# Patient Record
Sex: MALE | Race: WHITE | NOT HISPANIC OR LATINO | Employment: OTHER | RURAL
[De-identification: names, ages, dates, MRNs, and addresses within clinical notes are randomized per-mention and may not be internally consistent; named-entity substitution may affect disease eponyms.]

---

## 2017-12-05 ENCOUNTER — HISTORICAL (OUTPATIENT)
Dept: ADMINISTRATIVE | Facility: HOSPITAL | Age: 72
End: 2017-12-05

## 2017-12-06 LAB
LAB AP CLINICAL INFORMATION: NORMAL
LAB AP DIAGNOSIS - HISTORICAL: NORMAL
LAB AP GROSS PATHOLOGY - HISTORICAL: NORMAL
LAB AP SPECIMEN SUBMITTED - HISTORICAL: NORMAL

## 2020-07-13 ENCOUNTER — HISTORICAL (OUTPATIENT)
Dept: ADMINISTRATIVE | Facility: HOSPITAL | Age: 75
End: 2020-07-13

## 2020-07-15 LAB
INSULIN SERPL-ACNC: NORMAL U[IU]/ML
LAB AP CLINICAL INFORMATION: NORMAL
LAB AP COMMENTS: NORMAL
LAB AP DIAGNOSIS - HISTORICAL: NORMAL
LAB AP GROSS PATHOLOGY - HISTORICAL: NORMAL
LAB AP SPECIMEN SUBMITTED - HISTORICAL: NORMAL

## 2020-11-18 ENCOUNTER — HISTORICAL (OUTPATIENT)
Dept: ADMINISTRATIVE | Facility: HOSPITAL | Age: 75
End: 2020-11-18

## 2020-11-20 ENCOUNTER — HISTORICAL (OUTPATIENT)
Dept: ADMINISTRATIVE | Facility: HOSPITAL | Age: 75
End: 2020-11-20

## 2020-11-20 LAB — SARS-COV+SARS-COV-2 AG RESP QL IA.RAPID: NEGATIVE

## 2021-03-29 RX ORDER — PRAVASTATIN SODIUM 80 MG/1
80 TABLET ORAL DAILY
Qty: 90 TABLET | Refills: 3 | Status: SHIPPED | OUTPATIENT
Start: 2021-03-29 | End: 2021-08-19 | Stop reason: SDUPTHER

## 2021-03-29 RX ORDER — PRAVASTATIN SODIUM 80 MG/1
80 TABLET ORAL DAILY
COMMUNITY
End: 2021-03-29 | Stop reason: SDUPTHER

## 2021-04-07 RX ORDER — LISINOPRIL 20 MG/1
20 TABLET ORAL DAILY
COMMUNITY
End: 2021-04-07 | Stop reason: SDUPTHER

## 2021-04-07 RX ORDER — LISINOPRIL 20 MG/1
20 TABLET ORAL DAILY
Qty: 90 TABLET | Refills: 3 | Status: SHIPPED | OUTPATIENT
Start: 2021-04-07 | End: 2021-08-19 | Stop reason: SDUPTHER

## 2021-07-12 ENCOUNTER — OFFICE VISIT (OUTPATIENT)
Dept: PRIMARY CARE CLINIC | Facility: CLINIC | Age: 76
End: 2021-07-12
Payer: MEDICARE

## 2021-07-12 VITALS
SYSTOLIC BLOOD PRESSURE: 118 MMHG | BODY MASS INDEX: 40.5 KG/M2 | DIASTOLIC BLOOD PRESSURE: 82 MMHG | HEART RATE: 74 BPM | OXYGEN SATURATION: 98 % | WEIGHT: 299 LBS | RESPIRATION RATE: 16 BRPM | HEIGHT: 72 IN

## 2021-07-12 DIAGNOSIS — E66.01 MORBID OBESITY DUE TO EXCESS CALORIES: ICD-10-CM

## 2021-07-12 DIAGNOSIS — E78.5 HYPERLIPIDEMIA, UNSPECIFIED HYPERLIPIDEMIA TYPE: ICD-10-CM

## 2021-07-12 DIAGNOSIS — Z12.11 SCREENING FOR MALIGNANT NEOPLASM OF COLON: ICD-10-CM

## 2021-07-12 DIAGNOSIS — I10 ESSENTIAL HYPERTENSION: Primary | ICD-10-CM

## 2021-07-12 PROCEDURE — 99173 VISUAL ACUITY SCREEN: CPT | Mod: ,,, | Performed by: NURSE PRACTITIONER

## 2021-07-12 PROCEDURE — G0438 PPPS, INITIAL VISIT: HCPCS | Mod: ,,, | Performed by: NURSE PRACTITIONER

## 2021-07-12 PROCEDURE — 99173 PR VISUAL SCREENING TEST, BILAT: ICD-10-PCS | Mod: ,,, | Performed by: NURSE PRACTITIONER

## 2021-07-12 PROCEDURE — G0438 PR WELCOME MEDICARE ANNUAL WELLNESS INITIAL VISIT: ICD-10-PCS | Mod: ,,, | Performed by: NURSE PRACTITIONER

## 2021-07-12 RX ORDER — GEMFIBROZIL 600 MG/1
600 TABLET, FILM COATED ORAL DAILY
COMMUNITY
Start: 2021-06-14 | End: 2021-08-19 | Stop reason: SDUPTHER

## 2021-07-12 RX ORDER — OMEPRAZOLE 20 MG/1
20 CAPSULE, DELAYED RELEASE ORAL DAILY
COMMUNITY
Start: 2021-06-14 | End: 2021-08-19 | Stop reason: SDUPTHER

## 2021-07-12 RX ORDER — CETIRIZINE HYDROCHLORIDE, PSEUDOEPHEDRINE HYDROCHLORIDE 5; 120 MG/1; MG/1
1 TABLET, FILM COATED, EXTENDED RELEASE ORAL 2 TIMES DAILY
COMMUNITY
End: 2021-12-02 | Stop reason: SDUPTHER

## 2021-07-12 RX ORDER — AMLODIPINE BESYLATE 5 MG/1
5 TABLET ORAL 2 TIMES DAILY
COMMUNITY
Start: 2021-06-14 | End: 2021-08-19 | Stop reason: SDUPTHER

## 2021-07-14 DIAGNOSIS — Z86.010 HX OF COLONIC POLYPS: Primary | ICD-10-CM

## 2021-07-20 ENCOUNTER — OFFICE VISIT (OUTPATIENT)
Dept: PRIMARY CARE CLINIC | Facility: CLINIC | Age: 76
End: 2021-07-20
Payer: MEDICARE

## 2021-07-20 VITALS
SYSTOLIC BLOOD PRESSURE: 120 MMHG | WEIGHT: 301 LBS | BODY MASS INDEX: 40.77 KG/M2 | OXYGEN SATURATION: 96 % | HEART RATE: 90 BPM | RESPIRATION RATE: 18 BRPM | DIASTOLIC BLOOD PRESSURE: 82 MMHG | HEIGHT: 72 IN

## 2021-07-20 DIAGNOSIS — M19.90 OSTEOARTHRITIS, UNSPECIFIED OSTEOARTHRITIS TYPE, UNSPECIFIED SITE: ICD-10-CM

## 2021-07-20 DIAGNOSIS — Z12.5 PROSTATE CANCER SCREENING: ICD-10-CM

## 2021-07-20 DIAGNOSIS — I10 HYPERTENSION, UNSPECIFIED TYPE: ICD-10-CM

## 2021-07-20 DIAGNOSIS — E11.9 TYPE 2 DIABETES MELLITUS WITHOUT COMPLICATION, WITHOUT LONG-TERM CURRENT USE OF INSULIN: Primary | ICD-10-CM

## 2021-07-20 PROCEDURE — 99214 OFFICE O/P EST MOD 30 MIN: CPT | Mod: ,,, | Performed by: FAMILY MEDICINE

## 2021-07-20 PROCEDURE — 99214 PR OFFICE/OUTPT VISIT, EST, LEVL IV, 30-39 MIN: ICD-10-PCS | Mod: ,,, | Performed by: FAMILY MEDICINE

## 2021-07-20 RX ORDER — HYDROCODONE BITARTRATE AND ACETAMINOPHEN 7.5; 325 MG/1; MG/1
1 TABLET ORAL EVERY 6 HOURS PRN
Qty: 60 TABLET | Refills: 0 | Status: SHIPPED | OUTPATIENT
Start: 2021-07-20 | End: 2021-12-02 | Stop reason: SDUPTHER

## 2021-07-21 ENCOUNTER — TELEPHONE (OUTPATIENT)
Dept: PRIMARY CARE CLINIC | Facility: CLINIC | Age: 76
End: 2021-07-21

## 2021-08-19 ENCOUNTER — ANESTHESIA EVENT (OUTPATIENT)
Dept: GASTROENTEROLOGY | Facility: HOSPITAL | Age: 76
End: 2021-08-19
Payer: MEDICARE

## 2021-08-19 ENCOUNTER — HOSPITAL ENCOUNTER (OUTPATIENT)
Dept: GASTROENTEROLOGY | Facility: HOSPITAL | Age: 76
Discharge: HOME | End: 2021-08-19
Attending: INTERNAL MEDICINE
Payer: MEDICARE

## 2021-08-19 ENCOUNTER — ANESTHESIA (OUTPATIENT)
Dept: GASTROENTEROLOGY | Facility: HOSPITAL | Age: 76
End: 2021-08-19
Payer: MEDICARE

## 2021-08-19 VITALS
BODY MASS INDEX: 41.58 KG/M2 | HEIGHT: 71 IN | SYSTOLIC BLOOD PRESSURE: 115 MMHG | TEMPERATURE: 98 F | OXYGEN SATURATION: 95 % | RESPIRATION RATE: 28 BRPM | DIASTOLIC BLOOD PRESSURE: 76 MMHG | HEART RATE: 74 BPM | WEIGHT: 297 LBS

## 2021-08-19 DIAGNOSIS — Z86.010 HISTORY OF COLON POLYPS: ICD-10-CM

## 2021-08-19 DIAGNOSIS — K57.30 DIVERTICULA, COLON: ICD-10-CM

## 2021-08-19 DIAGNOSIS — Z86.010 HX OF COLONIC POLYPS: ICD-10-CM

## 2021-08-19 DIAGNOSIS — K63.5 POLYP OF ASCENDING COLON, UNSPECIFIED TYPE: ICD-10-CM

## 2021-08-19 PROBLEM — Z12.11 SCREENING FOR COLON CANCER: Status: ACTIVE | Noted: 2021-07-20

## 2021-08-19 PROBLEM — Z86.0100 HISTORY OF COLON POLYPS: Status: ACTIVE | Noted: 2021-08-19

## 2021-08-19 PROCEDURE — D9220A PRA ANESTHESIA: ICD-10-PCS | Mod: PT,,, | Performed by: NURSE ANESTHETIST, CERTIFIED REGISTERED

## 2021-08-19 PROCEDURE — 88305 TISSUE EXAM BY PATHOLOGIST: CPT | Mod: SUR | Performed by: INTERNAL MEDICINE

## 2021-08-19 PROCEDURE — C1889 IMPLANT/INSERT DEVICE, NOC: HCPCS

## 2021-08-19 PROCEDURE — 88305 TISSUE EXAM BY PATHOLOGIST: CPT | Mod: 26,,, | Performed by: PATHOLOGY

## 2021-08-19 PROCEDURE — D9220A PRA ANESTHESIA: Mod: PT,,, | Performed by: NURSE ANESTHETIST, CERTIFIED REGISTERED

## 2021-08-19 PROCEDURE — 25000003 PHARM REV CODE 250: Performed by: NURSE ANESTHETIST, CERTIFIED REGISTERED

## 2021-08-19 PROCEDURE — 37000008 HC ANESTHESIA 1ST 15 MINUTES

## 2021-08-19 PROCEDURE — 27201423 OPTIME MED/SURG SUP & DEVICES STERILE SUPPLY

## 2021-08-19 PROCEDURE — 37000009 HC ANESTHESIA EA ADD 15 MINS

## 2021-08-19 PROCEDURE — 45380 COLONOSCOPY AND BIOPSY: CPT | Mod: PT

## 2021-08-19 PROCEDURE — 88305 SURGICAL PATHOLOGY: ICD-10-PCS | Mod: 26,,, | Performed by: PATHOLOGY

## 2021-08-19 PROCEDURE — 63600175 PHARM REV CODE 636 W HCPCS: Performed by: NURSE ANESTHETIST, CERTIFIED REGISTERED

## 2021-08-19 RX ORDER — PROPOFOL 10 MG/ML
INJECTION, EMULSION INTRAVENOUS
Status: DISCONTINUED | OUTPATIENT
Start: 2021-08-19 | End: 2021-08-19

## 2021-08-19 RX ORDER — LIDOCAINE HYDROCHLORIDE 20 MG/ML
INJECTION, SOLUTION EPIDURAL; INFILTRATION; INTRACAUDAL; PERINEURAL
Status: DISCONTINUED | OUTPATIENT
Start: 2021-08-19 | End: 2021-08-19

## 2021-08-19 RX ORDER — SODIUM CHLORIDE 0.9 % (FLUSH) 0.9 %
10 SYRINGE (ML) INJECTION
Status: DISCONTINUED | OUTPATIENT
Start: 2021-08-19 | End: 2021-08-20 | Stop reason: HOSPADM

## 2021-08-19 RX ADMIN — LIDOCAINE HYDROCHLORIDE 50 MG: 20 INJECTION, SOLUTION INTRAVENOUS at 10:08

## 2021-08-19 RX ADMIN — PROPOFOL 75 MG: 10 INJECTION, EMULSION INTRAVENOUS at 10:08

## 2021-08-19 RX ADMIN — PROPOFOL 50 MG: 10 INJECTION, EMULSION INTRAVENOUS at 10:08

## 2021-08-19 RX ADMIN — SODIUM CHLORIDE: 9 INJECTION, SOLUTION INTRAVENOUS at 10:08

## 2021-08-20 LAB
ESTROGEN SERPL-MCNC: NORMAL PG/ML
LAB AP GROSS DESCRIPTION: NORMAL
LAB AP LABORATORY NOTES: NORMAL
T3RU NFR SERPL: NORMAL %

## 2021-08-23 ENCOUNTER — TELEPHONE (OUTPATIENT)
Dept: GASTROENTEROLOGY | Facility: CLINIC | Age: 76
End: 2021-08-23

## 2021-08-23 RX ORDER — LISINOPRIL 20 MG/1
20 TABLET ORAL DAILY
Qty: 90 TABLET | Refills: 3 | Status: SHIPPED | OUTPATIENT
Start: 2021-08-23 | End: 2022-08-30 | Stop reason: SDUPTHER

## 2021-08-23 RX ORDER — PRAVASTATIN SODIUM 80 MG/1
80 TABLET ORAL DAILY
Qty: 90 TABLET | Refills: 3 | Status: SHIPPED | OUTPATIENT
Start: 2021-08-23 | End: 2022-08-30 | Stop reason: SDUPTHER

## 2021-08-23 RX ORDER — GEMFIBROZIL 600 MG/1
600 TABLET, FILM COATED ORAL DAILY
Qty: 90 TABLET | Refills: 3 | Status: SHIPPED | OUTPATIENT
Start: 2021-08-23 | End: 2022-08-30 | Stop reason: SDUPTHER

## 2021-08-23 RX ORDER — AMLODIPINE BESYLATE 5 MG/1
5 TABLET ORAL 2 TIMES DAILY
Qty: 180 TABLET | Refills: 3 | Status: SHIPPED | OUTPATIENT
Start: 2021-08-23 | End: 2022-08-30 | Stop reason: SDUPTHER

## 2021-08-23 RX ORDER — OMEPRAZOLE 20 MG/1
20 CAPSULE, DELAYED RELEASE ORAL DAILY
Qty: 90 CAPSULE | Refills: 3 | Status: SHIPPED | OUTPATIENT
Start: 2021-08-23 | End: 2022-08-30 | Stop reason: SDUPTHER

## 2021-12-02 RX ORDER — AZITHROMYCIN 500 MG/1
500 TABLET, FILM COATED ORAL DAILY
COMMUNITY
End: 2021-12-02 | Stop reason: SDUPTHER

## 2021-12-02 RX ORDER — CETIRIZINE HYDROCHLORIDE, PSEUDOEPHEDRINE HYDROCHLORIDE 5; 120 MG/1; MG/1
1 TABLET, FILM COATED, EXTENDED RELEASE ORAL 2 TIMES DAILY
Qty: 24 TABLET | Refills: 5 | Status: SHIPPED | OUTPATIENT
Start: 2021-12-02 | End: 2022-07-18

## 2021-12-02 RX ORDER — AZITHROMYCIN 500 MG/1
500 TABLET, FILM COATED ORAL DAILY
Qty: 5 TABLET | Refills: 1 | Status: SHIPPED | OUTPATIENT
Start: 2021-12-02 | End: 2022-03-01 | Stop reason: ALTCHOICE

## 2021-12-02 RX ORDER — HYDROCODONE BITARTRATE AND ACETAMINOPHEN 7.5; 325 MG/1; MG/1
1 TABLET ORAL EVERY 6 HOURS PRN
Qty: 60 TABLET | Refills: 0 | Status: SHIPPED | OUTPATIENT
Start: 2021-12-02 | End: 2022-03-01 | Stop reason: SDUPTHER

## 2022-02-25 DIAGNOSIS — I10 HTN (HYPERTENSION), BENIGN: ICD-10-CM

## 2022-02-25 DIAGNOSIS — E78.5 HYPERLIPIDEMIA, UNSPECIFIED HYPERLIPIDEMIA TYPE: ICD-10-CM

## 2022-02-25 DIAGNOSIS — E78.1 HYPERGLYCERIDEMIA: Primary | ICD-10-CM

## 2022-03-01 ENCOUNTER — OFFICE VISIT (OUTPATIENT)
Dept: PRIMARY CARE CLINIC | Facility: CLINIC | Age: 77
End: 2022-03-01
Payer: MEDICARE

## 2022-03-01 VITALS
HEIGHT: 71 IN | WEIGHT: 297 LBS | DIASTOLIC BLOOD PRESSURE: 72 MMHG | HEART RATE: 94 BPM | BODY MASS INDEX: 41.58 KG/M2 | OXYGEN SATURATION: 97 % | SYSTOLIC BLOOD PRESSURE: 130 MMHG | RESPIRATION RATE: 18 BRPM

## 2022-03-01 DIAGNOSIS — I10 ESSENTIAL HYPERTENSION: ICD-10-CM

## 2022-03-01 DIAGNOSIS — E11.9 TYPE 2 DIABETES MELLITUS WITHOUT COMPLICATION, WITHOUT LONG-TERM CURRENT USE OF INSULIN: ICD-10-CM

## 2022-03-01 DIAGNOSIS — E03.9 HYPOTHYROIDISM, UNSPECIFIED TYPE: ICD-10-CM

## 2022-03-01 DIAGNOSIS — M19.90 OSTEOARTHRITIS, UNSPECIFIED OSTEOARTHRITIS TYPE, UNSPECIFIED SITE: Primary | ICD-10-CM

## 2022-03-01 DIAGNOSIS — I10 HYPERTENSION, UNSPECIFIED TYPE: ICD-10-CM

## 2022-03-01 PROCEDURE — 20610 DRAIN/INJ JOINT/BURSA W/O US: CPT | Mod: RT,,, | Performed by: FAMILY MEDICINE

## 2022-03-01 PROCEDURE — 20610 PR DRAIN/INJECT LARGE JOINT/BURSA: ICD-10-PCS | Mod: RT,,, | Performed by: FAMILY MEDICINE

## 2022-03-01 PROCEDURE — 99214 OFFICE O/P EST MOD 30 MIN: CPT | Mod: ,,, | Performed by: FAMILY MEDICINE

## 2022-03-01 PROCEDURE — 99214 PR OFFICE/OUTPT VISIT, EST, LEVL IV, 30-39 MIN: ICD-10-PCS | Mod: ,,, | Performed by: FAMILY MEDICINE

## 2022-03-01 RX ORDER — METHYLPREDNISOLONE ACETATE 80 MG/ML
80 INJECTION, SUSPENSION INTRA-ARTICULAR; INTRALESIONAL; INTRAMUSCULAR; SOFT TISSUE
Status: COMPLETED | OUTPATIENT
Start: 2022-03-01 | End: 2022-03-01

## 2022-03-01 RX ORDER — HYDROCODONE BITARTRATE AND ACETAMINOPHEN 7.5; 325 MG/1; MG/1
1 TABLET ORAL EVERY 6 HOURS PRN
Qty: 60 TABLET | Refills: 0 | Status: SHIPPED | OUTPATIENT
Start: 2022-03-01 | End: 2022-07-18

## 2022-03-01 RX ORDER — DEXAMETHASONE SODIUM PHOSPHATE 4 MG/ML
4 INJECTION, SOLUTION INTRA-ARTICULAR; INTRALESIONAL; INTRAMUSCULAR; INTRAVENOUS; SOFT TISSUE
Status: COMPLETED | OUTPATIENT
Start: 2022-03-01 | End: 2022-03-01

## 2022-03-01 RX ADMIN — METHYLPREDNISOLONE ACETATE 80 MG: 80 INJECTION, SUSPENSION INTRA-ARTICULAR; INTRALESIONAL; INTRAMUSCULAR; SOFT TISSUE at 10:03

## 2022-03-01 RX ADMIN — DEXAMETHASONE SODIUM PHOSPHATE 4 MG: 4 INJECTION, SOLUTION INTRA-ARTICULAR; INTRALESIONAL; INTRAMUSCULAR; INTRAVENOUS; SOFT TISSUE at 10:03

## 2022-03-01 NOTE — PROGRESS NOTES
Subjective:      Patient ID: Diogenes Aguilar is a 77 y.o. male.    Chief Complaint: Follow-up (6mon. Ck-up), Hypertension, Diabetes, and (R) knee pain    Diogenes Aguilar a 77 y.o. male presents for follow up on all regular problems which are reviewed and discussed.   Needs right knee injected  Fu bp  Lab due next visit  Problem List Items Addressed This Visit        Cardiac/Vascular    Hypertension    Essential hypertension       Endocrine    Type 2 diabetes mellitus without complication, without long-term current use of insulin    Hypothyroidism       Orthopedic    Osteoarthritis - Primary          Past Medical History:  Past Medical History:   Diagnosis Date    Carcinoma of prostate     Diverticula, colon 8/19/2021    Essential hypertension     GERD (gastroesophageal reflux disease)     History of colon polyps 8/19/2021    Hyperlipidemia     Hypothyroidism     Obesity     Polyp of ascending colon 8/19/2021     Past Surgical History:   Procedure Laterality Date    COLONOSCOPY  04/08/2016    repeat 5 years    PROSTATECTOMY      UMBILICAL HERNIA REPAIR      x 3      Review of patient's allergies indicates:   Allergen Reactions    Opioids - morphine analogues      Current Outpatient Medications on File Prior to Visit   Medication Sig Dispense Refill    amLODIPine (NORVASC) 5 MG tablet Take 1 tablet (5 mg total) by mouth 2 (two) times daily. 180 tablet 3    cetirizine-pseudoephedrine 5-120 mg Tb12 Take 1 tablet by mouth 2 (two) times a day. 24 tablet 5    gemfibroziL (LOPID) 600 MG tablet Take 1 tablet (600 mg total) by mouth once daily. With food. 90 tablet 3    lisinopriL (PRINIVIL,ZESTRIL) 20 MG tablet Take 1 tablet (20 mg total) by mouth once daily. 90 tablet 3    omeprazole (PRILOSEC) 20 MG capsule Take 1 capsule (20 mg total) by mouth once daily. 90 capsule 3    pravastatin (PRAVACHOL) 80 MG tablet Take 1 tablet (80 mg total) by mouth once daily. 90 tablet 3    [DISCONTINUED] HYDROcodone-acetaminophen  "(NORCO) 7.5-325 mg per tablet Take 1 tablet by mouth every 6 (six) hours as needed for Pain. 60 tablet 0    [DISCONTINUED] azithromycin (ZITHROMAX) 500 MG tablet Take 1 tablet (500 mg total) by mouth once daily. 5 tablet 1     No current facility-administered medications on file prior to visit.     Social History     Socioeconomic History    Marital status:    Tobacco Use    Smoking status: Never Smoker    Smokeless tobacco: Never Used   Substance and Sexual Activity    Alcohol use: Yes     Comment: on occasion    Drug use: Never     Family History   Problem Relation Age of Onset    Breast cancer Mother          at age 49 from breast CA with mets       Review of Systems   Constitutional: Negative.    HENT: Negative for congestion, ear pain, nosebleeds and trouble swallowing.    Eyes: Negative for pain and itching.   Respiratory: Negative for chest tightness.    Cardiovascular: Negative for chest pain.   Gastrointestinal: Negative for abdominal distention.   Endocrine: Negative for cold intolerance and heat intolerance.   Genitourinary: Negative for difficulty urinating.   Musculoskeletal: Positive for arthralgias.   Neurological: Negative for dizziness.       Objective:     /72 (BP Location: Left arm, Patient Position: Sitting, BP Method: Large (Manual))   Pulse 94   Resp 18   Ht 5' 11" (1.803 m)   Wt 134.7 kg (297 lb)   SpO2 97%   BMI 41.42 kg/m²     Physical Exam  Constitutional:       Appearance: Normal appearance. He is obese.   HENT:      Head: Normocephalic and atraumatic.      Right Ear: External ear normal.      Left Ear: External ear normal.      Nose: Nose normal.      Mouth/Throat:      Mouth: Mucous membranes are moist.      Pharynx: Oropharynx is clear.   Eyes:      Pupils: Pupils are equal, round, and reactive to light.   Cardiovascular:      Rate and Rhythm: Normal rate and regular rhythm.      Heart sounds: Normal heart sounds.   Pulmonary:      Effort: Pulmonary effort " is normal.      Breath sounds: Normal breath sounds.   Abdominal:      Palpations: Abdomen is soft.   Musculoskeletal:         General: Swelling, tenderness and deformity present. Normal range of motion.      Cervical back: Normal range of motion and neck supple.   Skin:     General: Skin is warm and dry.   Neurological:      General: No focal deficit present.      Mental Status: He is alert.   Psychiatric:         Mood and Affect: Mood normal.         Behavior: Behavior normal.         Thought Content: Thought content normal.         Judgment: Judgment normal.       Assessment:     1. Osteoarthritis, unspecified osteoarthritis type, unspecified site    2. Type 2 diabetes mellitus without complication, without long-term current use of insulin    3. Hypothyroidism, unspecified type    4. Hypertension, unspecified type    5. Essential hypertension        Plan:     Problem List Items Addressed This Visit        Cardiac/Vascular    Hypertension    Essential hypertension       Endocrine    Type 2 diabetes mellitus without complication, without long-term current use of insulin    Hypothyroidism       Orthopedic    Osteoarthritis - Primary        No follow-ups on file.  Fu 6m + lab  After consent, r knee injected with 4mg decadron and 80mg depomedrol tolerated well.    I have discontinued DIOGENES Aguilar's azithromycin. I am also having him maintain his omeprazole, gemfibroziL, amLODIPine, lisinopriL, pravastatin, cetirizine-pseudoephedrine, and HYDROcodone-acetaminophen. We will continue to administer dexamethasone and methylPREDNISolone acetate.    Diogenes was seen today for follow-up, hypertension, diabetes and (r) knee pain.    Diagnoses and all orders for this visit:    Osteoarthritis, unspecified osteoarthritis type, unspecified site    Type 2 diabetes mellitus without complication, without long-term current use of insulin    Hypothyroidism, unspecified type    Hypertension, unspecified type    Essential  hypertension    Other orders  -     HYDROcodone-acetaminophen (NORCO) 7.5-325 mg per tablet; Take 1 tablet by mouth every 6 (six) hours as needed for Pain.  -     dexamethasone injection 4 mg  -     methylPREDNISolone acetate injection 80 mg      Medications Ordered This Encounter   Medications    dexamethasone injection 4 mg    HYDROcodone-acetaminophen (NORCO) 7.5-325 mg per tablet     Sig: Take 1 tablet by mouth every 6 (six) hours as needed for Pain.     Dispense:  60 tablet     Refill:  0     n/a      Order Specific Question:   I have reviewed the Prescription Drug Monitoring Program (PDMP) database for this patient prior to prescribing the above opioid medication     Answer:   Yes    methylPREDNISolone acetate injection 80 mg     [unfilled]  No orders of the defined types were placed in this encounter.

## 2022-03-11 DIAGNOSIS — Z71.89 COMPLEX CARE COORDINATION: ICD-10-CM

## 2022-04-30 ENCOUNTER — EXTERNAL CHRONIC CARE MANAGEMENT (OUTPATIENT)
Dept: PRIMARY CARE CLINIC | Facility: CLINIC | Age: 77
End: 2022-04-30
Payer: MEDICARE

## 2022-04-30 PROCEDURE — G0511 PR CHRONIC CARE MGMT, RHC OR FQHC ONLY, 20 MINS OR MORE: ICD-10-PCS | Mod: ,,, | Performed by: FAMILY MEDICINE

## 2022-04-30 PROCEDURE — G0511 CCM/BHI BY RHC/FQHC 20MIN MO: HCPCS | Mod: ,,, | Performed by: FAMILY MEDICINE

## 2022-05-31 ENCOUNTER — EXTERNAL CHRONIC CARE MANAGEMENT (OUTPATIENT)
Dept: PRIMARY CARE CLINIC | Facility: CLINIC | Age: 77
End: 2022-05-31
Payer: MEDICARE

## 2022-05-31 PROCEDURE — G0511 PR CHRONIC CARE MGMT, RHC OR FQHC ONLY, 20 MINS OR MORE: ICD-10-PCS | Mod: ,,, | Performed by: FAMILY MEDICINE

## 2022-05-31 PROCEDURE — G0511 CCM/BHI BY RHC/FQHC 20MIN MO: HCPCS | Mod: ,,, | Performed by: FAMILY MEDICINE

## 2022-06-30 ENCOUNTER — EXTERNAL CHRONIC CARE MANAGEMENT (OUTPATIENT)
Dept: PRIMARY CARE CLINIC | Facility: CLINIC | Age: 77
End: 2022-06-30
Payer: MEDICARE

## 2022-06-30 PROCEDURE — G0511 PR CHRONIC CARE MGMT, RHC OR FQHC ONLY, 20 MINS OR MORE: ICD-10-PCS | Mod: ,,, | Performed by: FAMILY MEDICINE

## 2022-06-30 PROCEDURE — G0511 CCM/BHI BY RHC/FQHC 20MIN MO: HCPCS | Mod: ,,, | Performed by: FAMILY MEDICINE

## 2022-07-11 ENCOUNTER — TELEPHONE (OUTPATIENT)
Dept: PRIMARY CARE CLINIC | Facility: CLINIC | Age: 77
End: 2022-07-11
Payer: MEDICARE

## 2022-07-13 DIAGNOSIS — Z11.59 NEED FOR HEPATITIS C SCREENING TEST: Primary | ICD-10-CM

## 2022-07-13 DIAGNOSIS — E11.69 TYPE 2 DIABETES MELLITUS WITH OTHER SPECIFIED COMPLICATION, WITHOUT LONG-TERM CURRENT USE OF INSULIN: ICD-10-CM

## 2022-07-15 NOTE — PROGRESS NOTES
[facility]     PATIENT NAME: Diogenes Aguilar   : 1945    AGE: 77 y.o. DATE: 2022   MRN: [id number]        Reason for Visit / Chief Complaint: Medicare AWV (Subsequent Medicare AWV )        Diogenes Aguilar presents for a Subsequent Medicare AWV today.     The following components were reviewed and updated:    Medical/Social/Family History:  Past Medical History:   Diagnosis Date    Carcinoma of prostate     Diverticula, colon 2021    Essential hypertension     GERD (gastroesophageal reflux disease)     History of colon polyps 2021    Hyperlipidemia     Hypothyroidism     Obesity     Polyp of ascending colon 2021        Family History   Problem Relation Age of Onset    Breast cancer Mother          at age 49 from breast CA with mets        Past Surgical History:   Procedure Laterality Date    COLONOSCOPY  2021    repeat 1 year    PROSTATECTOMY      UMBILICAL HERNIA REPAIR      x 3        Social History     Tobacco Use   Smoking Status Never Smoker   Smokeless Tobacco Never Used       Social History     Substance and Sexual Activity   Alcohol Use Yes    Comment: on occasion         · Allergies and Current Medications     Review of patient's allergies indicates:   Allergen Reactions    Opioids - morphine analogues        Current Outpatient Medications:     amLODIPine (NORVASC) 5 MG tablet, Take 1 tablet (5 mg total) by mouth 2 (two) times daily., Disp: 180 tablet, Rfl: 3    gemfibroziL (LOPID) 600 MG tablet, Take 1 tablet (600 mg total) by mouth once daily. With food., Disp: 90 tablet, Rfl: 3    lisinopriL (PRINIVIL,ZESTRIL) 20 MG tablet, Take 1 tablet (20 mg total) by mouth once daily., Disp: 90 tablet, Rfl: 3    omeprazole (PRILOSEC) 20 MG capsule, Take 1 capsule (20 mg total) by mouth once daily., Disp: 90 capsule, Rfl: 3    pravastatin (PRAVACHOL) 80 MG tablet, Take 1 tablet (80 mg total) by mouth once daily., Disp: 90 tablet, Rfl: 3     cetirizine-pseudoephedrine 5-120 mg Tb12, Take 1 tablet by mouth 2 (two) times a day. (Patient not taking: Reported on 7/18/2022), Disp: 24 tablet, Rfl: 5    HYDROcodone-acetaminophen (NORCO) 7.5-325 mg per tablet, Take 1 tablet by mouth every 6 (six) hours as needed for Pain. (Patient not taking: Reported on 7/18/2022), Disp: 60 tablet, Rfl: 0      · Health Risk Assessment   Fall Risk:  no   Obesity: BMI Body mass index is 40.17 kg/m².   Advance Directive:  Does not have an advanced directive.  Verbal information given and written information provided on today's AVS.   Depression: PHQ9- 0   HTN: DASH diet, exercise, weight management, med compliance, home BP monitoring, and follow-up discussed.   T2DM:  diabetic diet, glucose monitoring, activity level, weight management, med compliance, and follow-up discussed.  STI: not at risk   Statin Use: yes      · Health Maintenance   Last eye exam: 6 months ago with Dr. Watts   Last CV screen with lipids: 07/11/2022   Diabetes screening with fasting glucose or A1c: 07/11/2022   DEXA: NA              Last PSA screen: 07/20/2021   Colonoscopy: 08/23/2021  Repeat in 1 year   Flu Vaccine: out of season   Pneumonia vaccines: 13- 08/03/2017   COVID vaccine: 01/15/2021  02/12/2021   Hep B vaccine: NA  AAA screening: NA   HIV Screening: not at risk  Hepatitis C Screen: available  Low Dose CT Scan: NA    Health Maintenance Topics with due status: Not Due       Topic Last Completion Date    Lipid Panel 07/11/2022    Low Dose Statin 07/18/2022    Influenza Vaccine Not Due     Health Maintenance Due   Topic Date Due    Hepatitis C Screening  Never done    Diabetes Urine Screening  Never done    Foot Exam  Never done    Eye Exam  Never done    TETANUS VACCINE  Never done    Shingles Vaccine (1 of 2) Never done    Pneumococcal Vaccines (Age 65+) (2 - PPSV23 or PCV20) 08/03/2018    COVID-19 Vaccine (3 - Booster for Moderna series) 07/12/2021    Hemoglobin A1c  01/20/2022     Colonoscopy  08/19/2022         Lab results available in Epic or see dates from [facility] above:   Lab Results   Component Value Date    CHOL 149 07/11/2022    CHOL 176 07/20/2021     Lab Results   Component Value Date    HDL 44 07/11/2022    HDL 42 07/20/2021     Lab Results   Component Value Date    LDLCALC 84 07/11/2022    LDLCALC 109 07/20/2021     Lab Results   Component Value Date    TRIG 105 07/11/2022    TRIG 127 07/20/2021     Lab Results   Component Value Date    CHOLHDL 3.4 07/11/2022    CHOLHDL 4.2 07/20/2021       Lab Results   Component Value Date    HGBA1C 5.5 07/20/2021       Sodium   Date Value Ref Range Status   07/11/2022 138 136 - 145 mmol/L Final     Potassium   Date Value Ref Range Status   07/11/2022 4.9 3.5 - 5.1 mmol/L Final     Chloride   Date Value Ref Range Status   07/11/2022 102 98 - 107 mmol/L Final     CO2   Date Value Ref Range Status   07/11/2022 29 21 - 32 mmol/L Final     Glucose   Date Value Ref Range Status   07/11/2022 91 74 - 106 mg/dL Final     BUN   Date Value Ref Range Status   07/11/2022 25 (H) 7 - 18 mg/dL Final     Creatinine   Date Value Ref Range Status   07/11/2022 0.93 0.70 - 1.30 mg/dL Final     Calcium   Date Value Ref Range Status   07/11/2022 9.2 8.5 - 10.1 mg/dL Final     Total Protein   Date Value Ref Range Status   07/11/2022 7.5 6.4 - 8.2 g/dL Final     Albumin   Date Value Ref Range Status   07/11/2022 3.9 3.5 - 5.0 g/dL Final     Bilirubin, Total   Date Value Ref Range Status   07/11/2022 0.4 0.0 - 1.2 mg/dL Final     Alk Phos   Date Value Ref Range Status   07/11/2022 87 45 - 115 U/L Final     AST   Date Value Ref Range Status   07/11/2022 16 15 - 37 U/L Final     ALT   Date Value Ref Range Status   07/11/2022 10 (L) 16 - 61 U/L Final     Anion Gap   Date Value Ref Range Status   07/11/2022 12 7 - 16 mmol/L Final     eGFR   Date Value Ref Range Status   07/11/2022 84 >=60 mL/min/1.73m² Final         Lab Results   Component Value Date    PSA <0.010 07/20/2021  "         Incontinence  Bowel: no  Bladder: no      · Care Team:  Dr. Juanito CISSE  PCP        Dr. Watts  Ophthalmology        Dr. Raman  GI          **See Completed Assessments for Annual Wellness visit within the encounter summary    The following assessments were completed & reviewed:  · Depression Screening  · Cognitive function Screening  · Timed Get Up Test  · Whisper Test  · Vision Screen  · Health Risk Assessment  · Checklist of ADLs and IADLs        Objective  Vitals:    07/18/22 0819   BP: (!) 116/58   Pulse: 83   Resp: 16   Temp: 98.4 °F (36.9 °C)   SpO2: 96%   Weight: 130.6 kg (288 lb)   Height: 5' 11" (1.803 m)   PainSc: 0-No pain      Body mass index is 40.17 kg/m².  Ideal body weight: 75.3 kg (166 lb 0.1 oz)       Physical Exam      Assessment:     1. Encounter for subsequent annual wellness visit (AWV) in Medicare patient    2. Essential hypertension    3. Hypothyroidism, unspecified type    4. Hyperlipidemia, unspecified hyperlipidemia type    5. BMI 40.0-44.9, adult    6. Morbid obesity due to excess calories       Problem List Items Addressed This Visit        Cardiac/Vascular    Essential hypertension       Endocrine    Hypothyroidism      Other Visit Diagnoses     Encounter for subsequent annual wellness visit (AWV) in Medicare patient    -  Primary    Hyperlipidemia, unspecified hyperlipidemia type        BMI 40.0-44.9, adult        Morbid obesity due to excess calories                Plan:    Referrals:   None     Advised to call office if does not hear from anyone with referral appt within 2-3 weeks to check on status of referral. Voiced understanding.      Discussed and provided with a screening schedule and personal prevention plan in accordance with USPSTF age appropriate recommendations and Medicare screening guidelines.   Education, counseling, and referrals were provided as needed.  After Visit Summary printed and given to patient which includes written education and a list of any " referrals if indicated.     Education including diet, exercise, falls and advanced directives discussed with patient and patient verbalized understanding.      F/u plan for yearly AWV.    Signature: KONG Loza

## 2022-07-18 ENCOUNTER — OFFICE VISIT (OUTPATIENT)
Dept: PRIMARY CARE CLINIC | Facility: CLINIC | Age: 77
End: 2022-07-18
Payer: MEDICARE

## 2022-07-18 VITALS
RESPIRATION RATE: 16 BRPM | BODY MASS INDEX: 40.32 KG/M2 | HEIGHT: 71 IN | TEMPERATURE: 98 F | OXYGEN SATURATION: 96 % | HEART RATE: 83 BPM | SYSTOLIC BLOOD PRESSURE: 116 MMHG | DIASTOLIC BLOOD PRESSURE: 58 MMHG | WEIGHT: 288 LBS

## 2022-07-18 DIAGNOSIS — E66.01 MORBID OBESITY DUE TO EXCESS CALORIES: ICD-10-CM

## 2022-07-18 DIAGNOSIS — Z00.00 ENCOUNTER FOR SUBSEQUENT ANNUAL WELLNESS VISIT (AWV) IN MEDICARE PATIENT: Primary | ICD-10-CM

## 2022-07-18 DIAGNOSIS — I10 ESSENTIAL HYPERTENSION: ICD-10-CM

## 2022-07-18 DIAGNOSIS — E03.9 HYPOTHYROIDISM, UNSPECIFIED TYPE: ICD-10-CM

## 2022-07-18 DIAGNOSIS — E78.5 HYPERLIPIDEMIA, UNSPECIFIED HYPERLIPIDEMIA TYPE: ICD-10-CM

## 2022-07-18 PROCEDURE — G0439 PR MEDICARE ANNUAL WELLNESS SUBSEQUENT VISIT: ICD-10-PCS | Mod: ,,, | Performed by: NURSE PRACTITIONER

## 2022-07-18 PROCEDURE — G0439 PPPS, SUBSEQ VISIT: HCPCS | Mod: ,,, | Performed by: NURSE PRACTITIONER

## 2022-07-18 NOTE — PATIENT INSTRUCTIONS
Counseling and Referral of Other Preventative  (Italic type indicates deductible and co-insurance are waived)    Patient Name: Diogenes Aguilar  Today's Date: 7/18/2022    Health Maintenance         Date Due Completion Date    Hepatitis C Screening Never done ---    Diabetes Urine Screening Never done ---    Foot Exam Never done ---    Eye Exam Never done ---    TETANUS VACCINE Never done ---    Shingles Vaccine (1 of 2) Never done ---    Pneumococcal Vaccines (Age 65+) (2 - PPSV23 or PCV20) 08/03/2018 8/3/2017    COVID-19 Vaccine (3 - Booster for Moderna series) 07/12/2021 2/12/2021    Hemoglobin A1c 01/20/2022 7/20/2021    Colonoscopy 08/19/2022 8/19/2021    Influenza Vaccine (1) 09/01/2022 ---    Lipid Panel 07/11/2023 7/11/2022    Override on 7/20/2020: Done    Low Dose Statin 07/18/2023 7/18/2022          No orders of the defined types were placed in this encounter.

## 2022-07-31 ENCOUNTER — EXTERNAL CHRONIC CARE MANAGEMENT (OUTPATIENT)
Dept: PRIMARY CARE CLINIC | Facility: CLINIC | Age: 77
End: 2022-07-31
Payer: MEDICARE

## 2022-07-31 PROCEDURE — G0511 CCM/BHI BY RHC/FQHC 20MIN MO: HCPCS | Mod: ,,, | Performed by: FAMILY MEDICINE

## 2022-07-31 PROCEDURE — G0511 PR CHRONIC CARE MGMT, RHC OR FQHC ONLY, 20 MINS OR MORE: ICD-10-PCS | Mod: ,,, | Performed by: FAMILY MEDICINE

## 2022-08-18 ENCOUNTER — TELEPHONE (OUTPATIENT)
Dept: GASTROENTEROLOGY | Facility: CLINIC | Age: 77
End: 2022-08-18
Payer: MEDICARE

## 2022-08-18 DIAGNOSIS — Z86.010 HX OF COLONIC POLYPS: Primary | ICD-10-CM

## 2022-08-30 RX ORDER — GEMFIBROZIL 600 MG/1
600 TABLET, FILM COATED ORAL DAILY
Qty: 90 TABLET | Refills: 3 | Status: SHIPPED | OUTPATIENT
Start: 2022-08-30 | End: 2023-03-06 | Stop reason: SDUPTHER

## 2022-08-30 RX ORDER — LISINOPRIL 20 MG/1
20 TABLET ORAL DAILY
Qty: 90 TABLET | Refills: 3 | Status: SHIPPED | OUTPATIENT
Start: 2022-08-30 | End: 2023-03-06 | Stop reason: SDUPTHER

## 2022-08-30 RX ORDER — AMLODIPINE BESYLATE 5 MG/1
5 TABLET ORAL 2 TIMES DAILY
Qty: 180 TABLET | Refills: 3 | Status: SHIPPED | OUTPATIENT
Start: 2022-08-30 | End: 2023-03-06 | Stop reason: SDUPTHER

## 2022-08-30 RX ORDER — OMEPRAZOLE 20 MG/1
20 CAPSULE, DELAYED RELEASE ORAL DAILY
Qty: 90 CAPSULE | Refills: 3 | Status: SHIPPED | OUTPATIENT
Start: 2022-08-30 | End: 2023-03-06 | Stop reason: SDUPTHER

## 2022-08-30 RX ORDER — PRAVASTATIN SODIUM 80 MG/1
80 TABLET ORAL DAILY
Qty: 90 TABLET | Refills: 3 | Status: SHIPPED | OUTPATIENT
Start: 2022-08-30 | End: 2023-03-06 | Stop reason: SDUPTHER

## 2022-08-31 ENCOUNTER — EXTERNAL CHRONIC CARE MANAGEMENT (OUTPATIENT)
Dept: PRIMARY CARE CLINIC | Facility: CLINIC | Age: 77
End: 2022-08-31
Payer: MEDICARE

## 2022-08-31 PROCEDURE — G0511 CCM/BHI BY RHC/FQHC 20MIN MO: HCPCS | Mod: ,,, | Performed by: FAMILY MEDICINE

## 2022-08-31 PROCEDURE — G0511 PR CHRONIC CARE MGMT, RHC OR FQHC ONLY, 20 MINS OR MORE: ICD-10-PCS | Mod: ,,, | Performed by: FAMILY MEDICINE

## 2022-09-01 ENCOUNTER — OFFICE VISIT (OUTPATIENT)
Dept: PRIMARY CARE CLINIC | Facility: CLINIC | Age: 77
End: 2022-09-01
Payer: MEDICARE

## 2022-09-01 VITALS
RESPIRATION RATE: 18 BRPM | DIASTOLIC BLOOD PRESSURE: 74 MMHG | BODY MASS INDEX: 39.9 KG/M2 | SYSTOLIC BLOOD PRESSURE: 114 MMHG | OXYGEN SATURATION: 97 % | WEIGHT: 285 LBS | HEART RATE: 88 BPM | HEIGHT: 71 IN

## 2022-09-01 DIAGNOSIS — K63.5 POLYP OF ASCENDING COLON, UNSPECIFIED TYPE: ICD-10-CM

## 2022-09-01 DIAGNOSIS — E11.9 TYPE 2 DIABETES MELLITUS WITHOUT COMPLICATION, WITHOUT LONG-TERM CURRENT USE OF INSULIN: Primary | ICD-10-CM

## 2022-09-01 DIAGNOSIS — Z12.5 ENCOUNTER FOR SCREENING FOR MALIGNANT NEOPLASM OF PROSTATE: ICD-10-CM

## 2022-09-01 DIAGNOSIS — K57.30 DIVERTICULA, COLON: ICD-10-CM

## 2022-09-01 DIAGNOSIS — E03.9 HYPOTHYROIDISM, UNSPECIFIED TYPE: ICD-10-CM

## 2022-09-01 DIAGNOSIS — Z86.010 HISTORY OF COLON POLYPS: ICD-10-CM

## 2022-09-01 PROCEDURE — 99214 OFFICE O/P EST MOD 30 MIN: CPT | Mod: ,,, | Performed by: FAMILY MEDICINE

## 2022-09-01 PROCEDURE — 99214 PR OFFICE/OUTPT VISIT, EST, LEVL IV, 30-39 MIN: ICD-10-PCS | Mod: ,,, | Performed by: FAMILY MEDICINE

## 2022-09-01 RX ORDER — CYCLOBENZAPRINE HCL 10 MG
10 TABLET ORAL 3 TIMES DAILY PRN
Qty: 90 TABLET | Refills: 5 | Status: SHIPPED | OUTPATIENT
Start: 2022-09-01 | End: 2022-09-11

## 2022-09-01 RX ORDER — HYDROCODONE BITARTRATE AND ACETAMINOPHEN 10; 325 MG/1; MG/1
1 TABLET ORAL EVERY 6 HOURS PRN
Qty: 120 TABLET | Refills: 0 | Status: SHIPPED | OUTPATIENT
Start: 2022-09-01 | End: 2022-11-22 | Stop reason: SDUPTHER

## 2022-09-01 NOTE — PROGRESS NOTES
Subjective:      Patient ID: Diogenes Aguilar is a 77 y.o. male.    Chief Complaint: Follow-up (6mon ck-up) and Diabetes    Diogenes Aguilar a 77 y.o. male presents for follow up on all regular problems which are reviewed and discussed.   Wants marijuana for pain  Problem List Items Addressed This Visit          Endocrine    Type 2 diabetes mellitus without complication, without long-term current use of insulin - Primary    Hypothyroidism       GI    History of colon polyps    Diverticula, colon    Polyp of ascending colon       Past Medical History:  Past Medical History:   Diagnosis Date    Carcinoma of prostate     Diverticula, colon 8/19/2021    Essential hypertension     GERD (gastroesophageal reflux disease)     History of colon polyps 8/19/2021    Hyperlipidemia     Hypothyroidism     Obesity     Polyp of ascending colon 8/19/2021     Past Surgical History:   Procedure Laterality Date    COLONOSCOPY  08/23/2021    repeat 1 year    PROSTATECTOMY      UMBILICAL HERNIA REPAIR      x 3      Review of patient's allergies indicates:   Allergen Reactions    Opioids - morphine analogues      Current Outpatient Medications on File Prior to Visit   Medication Sig Dispense Refill    amLODIPine (NORVASC) 5 MG tablet Take 1 tablet (5 mg total) by mouth 2 (two) times daily. 180 tablet 3    gemfibroziL (LOPID) 600 MG tablet Take 1 tablet (600 mg total) by mouth once daily. With food. 90 tablet 3    lisinopriL (PRINIVIL,ZESTRIL) 20 MG tablet Take 1 tablet (20 mg total) by mouth once daily. 90 tablet 3    omeprazole (PRILOSEC) 20 MG capsule Take 1 capsule (20 mg total) by mouth once daily. 90 capsule 3    pravastatin (PRAVACHOL) 80 MG tablet Take 1 tablet (80 mg total) by mouth once daily. 90 tablet 3     No current facility-administered medications on file prior to visit.     Social History     Socioeconomic History    Marital status:    Tobacco Use    Smoking status: Never    Smokeless tobacco: Never   Substance and Sexual  "Activity    Alcohol use: Yes     Comment: on occasion    Drug use: Never    Sexual activity: Not Currently     Family History   Problem Relation Age of Onset    Breast cancer Mother          at age 49 from breast CA with mets       Review of Systems   Constitutional: Negative.    HENT:  Negative for congestion, ear pain, nosebleeds and trouble swallowing.    Eyes:  Negative for pain and itching.   Respiratory:  Negative for chest tightness.    Cardiovascular:  Negative for chest pain.   Gastrointestinal:  Negative for abdominal distention.   Endocrine: Negative for cold intolerance and heat intolerance.   Genitourinary:  Negative for difficulty urinating.   Musculoskeletal:  Positive for arthralgias, back pain and gait problem.   Neurological:  Negative for dizziness.     Objective:     /74 (BP Location: Left arm, Patient Position: Sitting, BP Method: Large (Manual))   Pulse 88   Resp 18   Ht 5' 11" (1.803 m)   Wt 129.3 kg (285 lb)   SpO2 97%   BMI 39.75 kg/m²     Physical Exam  Constitutional:       Appearance: Normal appearance. He is obese.   HENT:      Head: Normocephalic and atraumatic.      Right Ear: External ear normal.      Left Ear: External ear normal.      Nose: Nose normal.      Mouth/Throat:      Mouth: Mucous membranes are moist.      Pharynx: Oropharynx is clear.   Eyes:      Pupils: Pupils are equal, round, and reactive to light.   Cardiovascular:      Rate and Rhythm: Normal rate and regular rhythm.      Heart sounds: Normal heart sounds.   Pulmonary:      Effort: Pulmonary effort is normal.      Breath sounds: Normal breath sounds.   Abdominal:      Palpations: Abdomen is soft.   Musculoskeletal:         General: Normal range of motion.      Cervical back: Normal range of motion and neck supple.   Skin:     General: Skin is warm and dry.   Neurological:      General: No focal deficit present.      Mental Status: He is alert.   Psychiatric:         Mood and Affect: Mood normal.    "      Behavior: Behavior normal.         Thought Content: Thought content normal.         Judgment: Judgment normal.       1. Type 2 diabetes mellitus without complication, without long-term current use of insulin    2. Hypothyroidism, unspecified type    3. History of colon polyps    4. Diverticula, colon    5. Polyp of ascending colon, unspecified type        Plan:     Problem List Items Addressed This Visit          Endocrine    Type 2 diabetes mellitus without complication, without long-term current use of insulin - Primary    Hypothyroidism       GI    History of colon polyps    Diverticula, colon    Polyp of ascending colon     No follow-ups on file.  I recc. He try  long time opiate use,chronic pain etc  6m fu  Ed. given    I am having DIOGENES Aguilar start on HYDROcodone-acetaminophen and cyclobenzaprine. I am also having him maintain his gemfibroziL, lisinopriL, amLODIPine, pravastatin, and omeprazole.    Diogenes was seen today for follow-up and diabetes.    Diagnoses and all orders for this visit:    Type 2 diabetes mellitus without complication, without long-term current use of insulin    Hypothyroidism, unspecified type    History of colon polyps    Diverticula, colon    Polyp of ascending colon, unspecified type    Other orders  -     HYDROcodone-acetaminophen (NORCO)  mg per tablet; Take 1 tablet by mouth every 6 (six) hours as needed for Pain.  -     cyclobenzaprine (FLEXERIL) 10 MG tablet; Take 1 tablet (10 mg total) by mouth 3 (three) times daily as needed for Muscle spasms.    Medications Ordered This Encounter   Medications    cyclobenzaprine (FLEXERIL) 10 MG tablet     Sig: Take 1 tablet (10 mg total) by mouth 3 (three) times daily as needed for Muscle spasms.     Dispense:  90 tablet     Refill:  5    HYDROcodone-acetaminophen (NORCO)  mg per tablet     Sig: Take 1 tablet by mouth every 6 (six) hours as needed for Pain.     Dispense:  120 tablet     Refill:  0     n/a      Order Specific  Question:   I have reviewed the Prescription Drug Monitoring Program (PDMP) database for this patient prior to prescribing the above opioid medication     Answer:   Yes     [unfilled]  No orders of the defined types were placed in this encounter.

## 2022-09-30 ENCOUNTER — EXTERNAL CHRONIC CARE MANAGEMENT (OUTPATIENT)
Dept: PRIMARY CARE CLINIC | Facility: CLINIC | Age: 77
End: 2022-09-30
Payer: MEDICARE

## 2022-09-30 PROCEDURE — G0511 CCM/BHI BY RHC/FQHC 20MIN MO: HCPCS | Mod: ,,, | Performed by: FAMILY MEDICINE

## 2022-09-30 PROCEDURE — G0511 PR CHRONIC CARE MGMT, RHC OR FQHC ONLY, 20 MINS OR MORE: ICD-10-PCS | Mod: ,,, | Performed by: FAMILY MEDICINE

## 2022-10-10 RX ORDER — CETIRIZINE HYDROCHLORIDE, PSEUDOEPHEDRINE HYDROCHLORIDE 5; 120 MG/1; MG/1
1 TABLET, FILM COATED, EXTENDED RELEASE ORAL 2 TIMES DAILY
Qty: 60 TABLET | Refills: 3 | Status: SHIPPED | OUTPATIENT
Start: 2022-10-10 | End: 2022-10-20

## 2022-10-10 RX ORDER — CETIRIZINE HYDROCHLORIDE 10 MG/1
10 TABLET ORAL DAILY
Qty: 90 TABLET | Refills: 4 | Status: SHIPPED | OUTPATIENT
Start: 2022-10-10 | End: 2023-02-21

## 2022-10-31 ENCOUNTER — EXTERNAL CHRONIC CARE MANAGEMENT (OUTPATIENT)
Dept: PRIMARY CARE CLINIC | Facility: CLINIC | Age: 77
End: 2022-10-31
Payer: MEDICARE

## 2022-10-31 PROCEDURE — G0511 PR CHRONIC CARE MGMT, RHC OR FQHC ONLY, 20 MINS OR MORE: ICD-10-PCS | Mod: ,,, | Performed by: FAMILY MEDICINE

## 2022-10-31 PROCEDURE — G0511 CCM/BHI BY RHC/FQHC 20MIN MO: HCPCS | Mod: ,,, | Performed by: FAMILY MEDICINE

## 2022-11-09 ENCOUNTER — ANESTHESIA EVENT (OUTPATIENT)
Dept: GASTROENTEROLOGY | Facility: HOSPITAL | Age: 77
End: 2022-11-09
Payer: MEDICARE

## 2022-11-09 ENCOUNTER — ANESTHESIA (OUTPATIENT)
Dept: GASTROENTEROLOGY | Facility: HOSPITAL | Age: 77
End: 2022-11-09
Payer: MEDICARE

## 2022-11-09 ENCOUNTER — HOSPITAL ENCOUNTER (OUTPATIENT)
Dept: GASTROENTEROLOGY | Facility: HOSPITAL | Age: 77
Discharge: HOME | End: 2022-11-09
Attending: INTERNAL MEDICINE
Payer: MEDICARE

## 2022-11-09 VITALS
OXYGEN SATURATION: 96 % | SYSTOLIC BLOOD PRESSURE: 111 MMHG | TEMPERATURE: 98 F | DIASTOLIC BLOOD PRESSURE: 64 MMHG | RESPIRATION RATE: 13 BRPM | HEART RATE: 66 BPM

## 2022-11-09 DIAGNOSIS — Z86.010 HX OF COLONIC POLYPS: ICD-10-CM

## 2022-11-09 DIAGNOSIS — K62.1 POLYP OF RECTUM: Primary | ICD-10-CM

## 2022-11-09 DIAGNOSIS — K57.30 DIVERTICULOSIS OF COLON: ICD-10-CM

## 2022-11-09 DIAGNOSIS — K64.4 EXTERNAL HEMORRHOID: ICD-10-CM

## 2022-11-09 PROCEDURE — 45385 COLONOSCOPY W/LESION REMOVAL: CPT | Mod: PT

## 2022-11-09 PROCEDURE — D9220A PRA ANESTHESIA: ICD-10-PCS | Mod: PT,,, | Performed by: NURSE ANESTHETIST, CERTIFIED REGISTERED

## 2022-11-09 PROCEDURE — 37000009 HC ANESTHESIA EA ADD 15 MINS

## 2022-11-09 PROCEDURE — 63600175 PHARM REV CODE 636 W HCPCS: Performed by: NURSE ANESTHETIST, CERTIFIED REGISTERED

## 2022-11-09 PROCEDURE — 45380 COLONOSCOPY AND BIOPSY: CPT

## 2022-11-09 PROCEDURE — 88305 SURGICAL PATHOLOGY: ICD-10-PCS | Mod: 26,,, | Performed by: PATHOLOGY

## 2022-11-09 PROCEDURE — 37000008 HC ANESTHESIA 1ST 15 MINUTES

## 2022-11-09 PROCEDURE — 88305 TISSUE EXAM BY PATHOLOGIST: CPT | Mod: 26,,, | Performed by: PATHOLOGY

## 2022-11-09 PROCEDURE — 45385 COLONOSCOPY W/LESION REMOVAL: CPT

## 2022-11-09 PROCEDURE — 88305 TISSUE EXAM BY PATHOLOGIST: CPT | Mod: SUR | Performed by: INTERNAL MEDICINE

## 2022-11-09 PROCEDURE — D9220A PRA ANESTHESIA: Mod: PT,,, | Performed by: NURSE ANESTHETIST, CERTIFIED REGISTERED

## 2022-11-09 PROCEDURE — 25000003 PHARM REV CODE 250: Performed by: NURSE ANESTHETIST, CERTIFIED REGISTERED

## 2022-11-09 PROCEDURE — 27000284 HC CANNULA NASAL: Performed by: NURSE ANESTHETIST, CERTIFIED REGISTERED

## 2022-11-09 RX ORDER — PHENYLEPHRINE HYDROCHLORIDE 10 MG/ML
INJECTION INTRAVENOUS
Status: DISCONTINUED | OUTPATIENT
Start: 2022-11-09 | End: 2022-11-09

## 2022-11-09 RX ORDER — SODIUM CHLORIDE 0.9 % (FLUSH) 0.9 %
10 SYRINGE (ML) INJECTION
Status: CANCELLED | OUTPATIENT
Start: 2022-11-09

## 2022-11-09 RX ORDER — PROPOFOL 10 MG/ML
VIAL (ML) INTRAVENOUS
Status: DISCONTINUED | OUTPATIENT
Start: 2022-11-09 | End: 2022-11-09

## 2022-11-09 RX ORDER — LIDOCAINE HYDROCHLORIDE 20 MG/ML
INJECTION, SOLUTION EPIDURAL; INFILTRATION; INTRACAUDAL; PERINEURAL
Status: DISCONTINUED | OUTPATIENT
Start: 2022-11-09 | End: 2022-11-09

## 2022-11-09 RX ADMIN — PROPOFOL 30 MG: 10 INJECTION, EMULSION INTRAVENOUS at 08:11

## 2022-11-09 RX ADMIN — PHENYLEPHRINE HYDROCHLORIDE 200 MCG: 10 INJECTION INTRAVENOUS at 08:11

## 2022-11-09 RX ADMIN — PROPOFOL 80 MG: 10 INJECTION, EMULSION INTRAVENOUS at 07:11

## 2022-11-09 RX ADMIN — LIDOCAINE HYDROCHLORIDE 60 MG: 20 INJECTION, SOLUTION INTRAVENOUS at 07:11

## 2022-11-09 RX ADMIN — PROPOFOL 30 MG: 10 INJECTION, EMULSION INTRAVENOUS at 07:11

## 2022-11-09 RX ADMIN — PROPOFOL 20 MG: 10 INJECTION, EMULSION INTRAVENOUS at 07:11

## 2022-11-09 RX ADMIN — SODIUM CHLORIDE: 9 INJECTION, SOLUTION INTRAVENOUS at 07:11

## 2022-11-09 RX ADMIN — PHENYLEPHRINE HYDROCHLORIDE 100 MCG: 10 INJECTION INTRAVENOUS at 07:11

## 2022-11-09 NOTE — ANESTHESIA PREPROCEDURE EVALUATION
2022  Diogenes Aguilar is a 77 y.o., male.    Past Medical History:   Diagnosis Date    Carcinoma of prostate     Diverticula, colon 2021    Essential hypertension     GERD (gastroesophageal reflux disease)     History of colon polyps 2021    Hyperlipidemia     Hypothyroidism     Obesity     Polyp of ascending colon 2021       Past Surgical History:   Procedure Laterality Date    COLONOSCOPY  2021    repeat 1 year    PROSTATECTOMY      UMBILICAL HERNIA REPAIR      x 3        Family History   Problem Relation Age of Onset    Breast cancer Mother          at age 49 from breast CA with mets       Social History     Socioeconomic History    Marital status:    Tobacco Use    Smoking status: Never    Smokeless tobacco: Never   Substance and Sexual Activity    Alcohol use: Yes     Comment: on occasion    Drug use: Never    Sexual activity: Not Currently       Current Outpatient Medications   Medication Sig Dispense Refill    amLODIPine (NORVASC) 5 MG tablet Take 1 tablet (5 mg total) by mouth 2 (two) times daily. 180 tablet 3    cetirizine (ZYRTEC) 10 MG tablet Take 1 tablet (10 mg total) by mouth once daily. 90 tablet 4    gemfibroziL (LOPID) 600 MG tablet Take 1 tablet (600 mg total) by mouth once daily. With food. 90 tablet 3    HYDROcodone-acetaminophen (NORCO)  mg per tablet Take 1 tablet by mouth every 6 (six) hours as needed for Pain. 120 tablet 0    lisinopriL (PRINIVIL,ZESTRIL) 20 MG tablet Take 1 tablet (20 mg total) by mouth once daily. 90 tablet 3    omeprazole (PRILOSEC) 20 MG capsule Take 1 capsule (20 mg total) by mouth once daily. 90 capsule 3    pravastatin (PRAVACHOL) 80 MG tablet Take 1 tablet (80 mg total) by mouth once daily. 90 tablet 3     No current facility-administered medications for this encounter.       Review of patient's  allergies indicates:   Allergen Reactions    Opioids - morphine analogues        Pre-op Assessment    I have reviewed the Patient Summary Reports.     I have reviewed the Nursing Notes. I have reviewed the NPO Status.   I have reviewed the Medications.     Review of Systems  Anesthesia Hx:  No problems with previous Anesthesia    Cardiovascular:   Hypertension    Hepatic/GI:   GERD    Musculoskeletal:   Arthritis     Endocrine:   Hypothyroidism        Physical Exam    Airway:  Mallampati: II   Mouth Opening: Normal  TM Distance: Normal  Tongue: Normal        Anesthesia Plan  Type of Anesthesia, risks & benefits discussed:    Anesthesia Type: Gen Natural Airway  Intra-op Monitoring Plan: Standard ASA Monitors  Post Op Pain Control Plan: multimodal analgesia  Induction:  IV  Informed Consent: Informed consent signed with the Patient and all parties understand the risks and agree with anesthesia plan.  All questions answered. Patient consented to blood products? Yes  ASA Score: 2  Day of Surgery Review of History & Physical: H&P Update referred to the surgeon/provider.    Ready For Surgery From Anesthesia Perspective.     .

## 2022-11-09 NOTE — ANESTHESIA POSTPROCEDURE EVALUATION
Anesthesia Post Evaluation    Patient: Diogenes Aguilar    Procedure(s) Performed: Colonoscopy    Final Anesthesia Type: general      Patient location during evaluation: GI PACU  Patient participation: Yes- Able to Participate  Level of consciousness: awake and alert  Post-procedure vital signs: reviewed and stable  Pain management: adequate  Airway patency: patent  MANUELA mitigation strategies: Multimodal analgesia  PONV status at discharge: No PONV  Anesthetic complications: no      Cardiovascular status: stable  Respiratory status: unassisted  Hydration status: euvolemic  Follow-up not needed.          Vitals Value Taken Time   /79 11/09/22 0847   Temp 98.2F 11/09/22 0848   Pulse 71 11/09/22 0848   Resp 12 11/09/22 0848   SpO2 96 % 11/09/22 0848   Vitals shown include unvalidated device data.      No case tracking events are documented in the log.      Pain/Paramjit Score: Paramjit Score: 10 (11/9/2022  8:33 AM)

## 2022-11-09 NOTE — H&P
[facility]  Gastroenterology  H&P    Patient Name: Diogenes Aguilar  MRN: [id number]  Admission Date: 11/9/2022  Code Status: Prior    Attending Provider: Rohan Raman MD   Primary Care Physician: Timothy Solomon III, DO  Principal Problem:<principal problem not specified>    Subjective:     History of Present Illness: Pt has history of colon polyps and his last colonoscopy was 2021 with poor prep and difficult exam.    Past Medical History:   Diagnosis Date    Carcinoma of prostate     Diverticula, colon 8/19/2021    Essential hypertension     GERD (gastroesophageal reflux disease)     History of colon polyps 8/19/2021    Hyperlipidemia     Hypothyroidism     Obesity     Polyp of ascending colon 8/19/2021       Past Surgical History:   Procedure Laterality Date    COLONOSCOPY  08/23/2021    repeat 1 year    PROSTATECTOMY      UMBILICAL HERNIA REPAIR      x 3        Review of patient's allergies indicates:   Allergen Reactions    Opioids - morphine analogues      Family History       Problem Relation (Age of Onset)    Breast cancer Mother          Tobacco Use    Smoking status: Never    Smokeless tobacco: Never   Substance and Sexual Activity    Alcohol use: Yes     Comment: on occasion    Drug use: Never    Sexual activity: Not Currently     Review of Systems   Respiratory: Negative.     Cardiovascular: Negative.    Gastrointestinal: Negative.    Objective:     Vital Signs (Most Recent):  Pulse: 84 (11/09/22 0729)  Resp: 13 (11/09/22 0729)  BP: 133/83 (11/09/22 0729)  SpO2: 96 % (11/09/22 0729) Vital Signs (24h Range):  Pulse:  [84] 84  Resp:  [13] 13  SpO2:  [96 %] 96 %  BP: (133)/(83) 133/83        There is no height or weight on file to calculate BMI.    No intake or output data in the 24 hours ending 11/09/22 0751    Lines/Drains/Airways       Peripheral Intravenous Line  Duration                  Peripheral IV - Single Lumen 11/09/22 0729 Right Antecubital <1 day                    Physical  Exam  Vitals reviewed.   Constitutional:       General: He is not in acute distress.     Appearance: Normal appearance. He is well-developed. He is not ill-appearing.   HENT:      Head: Normocephalic and atraumatic.      Nose: Nose normal.   Eyes:      Pupils: Pupils are equal, round, and reactive to light.   Cardiovascular:      Rate and Rhythm: Normal rate and regular rhythm.   Pulmonary:      Effort: Pulmonary effort is normal.      Breath sounds: Normal breath sounds. No wheezing.   Abdominal:      General: Abdomen is flat. Bowel sounds are normal. There is no distension.      Palpations: Abdomen is soft.      Tenderness: There is no abdominal tenderness. There is no guarding.   Skin:     General: Skin is warm and dry.      Coloration: Skin is not jaundiced.   Neurological:      Mental Status: He is alert.   Psychiatric:         Attention and Perception: Attention normal.         Mood and Affect: Affect normal.         Speech: Speech normal.         Behavior: Behavior is cooperative.      Comments: Pt was calm while speaking.       Significant Labs:  CBC: No results for input(s): WBC, HGB, HCT, PLT in the last 48 hours.  CMP: No results for input(s): GLU, CALCIUM, ALBUMIN, PROT, NA, K, CO2, CL, BUN, CREATININE, ALKPHOS, ALT, AST, BILITOT in the last 48 hours.    Significant Imaging:  Imaging results within the past 24 hours have been reviewed.    Assessment/Plan:     There are no hospital problems to display for this patient.        Imp:Colon polyp and hx of colon polyps  Plan: colonoscopy    Rohan Raman MD  Gastroenterology  [facility]

## 2022-11-09 NOTE — TRANSFER OF CARE
Anesthesia Transfer of Care Note    Patient: Diogenes Aguilar    Procedure(s) Performed: Colonoscopy    Patient location: GI    Anesthesia Type: general    Transport from OR: Transported from OR on room air with adequate spontaneous ventilation    Post pain: adequate analgesia    Post assessment: no apparent anesthetic complications and tolerated procedure well    Post vital signs: stable    Level of consciousness: responds to stimulation and sedated    Nausea/Vomiting: no nausea/vomiting    Complications: none    Transfer of care protocol was followed      Last vitals:   Visit Vitals  /70 (BP Location: Left arm, Patient Position: Lying)   Pulse 62   Temp 36.8 °C (98.2 °F) (Skin)   Resp 12   SpO2 97%

## 2022-11-09 NOTE — DISCHARGE INSTRUCTIONS
Procedure Date  11/9/22     Impression  Overall Impression: External hemorrhoids were noted without bleeding. Two rectal polyps were removed via hot snare. Diverticulosis was noted in the sigmoid and descending colon.     Recommendation    Await pathology results     Repeat colonoscopy in 5 years      High fiber diet  Disp: DC to home in stable condition. Resume diet and activity. No driving x 24 hours. Dx: History of colon polyps, external hemorrhoids, colon diverticulosis, rectum polyps    Drink fluids, no operating heavy machinery, driving, or signing legal documents until tomorrow.

## 2022-11-10 LAB
ESTROGEN SERPL-MCNC: NORMAL PG/ML
INSULIN SERPL-ACNC: NORMAL U[IU]/ML
LAB AP GROSS DESCRIPTION: NORMAL
LAB AP LABORATORY NOTES: NORMAL
T3RU NFR SERPL: NORMAL %

## 2022-11-11 ENCOUNTER — TELEPHONE (OUTPATIENT)
Dept: GASTROENTEROLOGY | Facility: CLINIC | Age: 77
End: 2022-11-11
Payer: MEDICARE

## 2022-11-11 NOTE — TELEPHONE ENCOUNTER
Attempted to call patient about results and left vm for a call back.      ----- Message from Rohan Raman MD sent at 11/11/2022  7:37 AM CST -----  Place pt on list for colonoscopy in 5 years due to hx of polyps. These polyps were hyperplastic.

## 2022-11-22 RX ORDER — HYDROCODONE BITARTRATE AND ACETAMINOPHEN 10; 325 MG/1; MG/1
1 TABLET ORAL EVERY 6 HOURS PRN
Qty: 120 TABLET | Refills: 0 | Status: SHIPPED | OUTPATIENT
Start: 2022-11-22 | End: 2023-02-13 | Stop reason: SDUPTHER

## 2022-11-30 ENCOUNTER — EXTERNAL CHRONIC CARE MANAGEMENT (OUTPATIENT)
Dept: PRIMARY CARE CLINIC | Facility: CLINIC | Age: 77
End: 2022-11-30
Payer: MEDICARE

## 2022-11-30 PROCEDURE — G0511 CCM/BHI BY RHC/FQHC 20MIN MO: HCPCS | Mod: ,,, | Performed by: FAMILY MEDICINE

## 2022-11-30 PROCEDURE — G0511 PR CHRONIC CARE MGMT, RHC OR FQHC ONLY, 20 MINS OR MORE: ICD-10-PCS | Mod: ,,, | Performed by: FAMILY MEDICINE

## 2022-12-31 ENCOUNTER — EXTERNAL CHRONIC CARE MANAGEMENT (OUTPATIENT)
Dept: PRIMARY CARE CLINIC | Facility: CLINIC | Age: 77
End: 2022-12-31
Payer: MEDICARE

## 2022-12-31 PROCEDURE — G0511 CCM/BHI BY RHC/FQHC 20MIN MO: HCPCS | Mod: ,,, | Performed by: FAMILY MEDICINE

## 2022-12-31 PROCEDURE — G0511 PR CHRONIC CARE MGMT, RHC OR FQHC ONLY, 20 MINS OR MORE: ICD-10-PCS | Mod: ,,, | Performed by: FAMILY MEDICINE

## 2023-01-09 RX ORDER — AZITHROMYCIN 250 MG/1
250 TABLET, FILM COATED ORAL DAILY
Qty: 6 TABLET | Refills: 1 | Status: SHIPPED | OUTPATIENT
Start: 2023-01-09 | End: 2023-01-19

## 2023-01-09 RX ORDER — PREDNISONE 20 MG/1
40 TABLET ORAL DAILY
Qty: 10 TABLET | Refills: 0 | Status: SHIPPED | OUTPATIENT
Start: 2023-01-09 | End: 2023-01-19

## 2023-01-09 RX ORDER — AZITHROMYCIN 250 MG/1
250 TABLET, FILM COATED ORAL DAILY
COMMUNITY
End: 2023-01-09 | Stop reason: SDUPTHER

## 2023-01-09 RX ORDER — PREDNISONE 20 MG/1
22 TABLET ORAL DAILY
COMMUNITY
End: 2023-01-09 | Stop reason: SDUPTHER

## 2023-01-19 ENCOUNTER — HOSPITAL ENCOUNTER (OUTPATIENT)
Dept: RADIOLOGY | Facility: HOSPITAL | Age: 78
Discharge: HOME | End: 2023-01-19
Attending: FAMILY MEDICINE
Payer: MEDICARE

## 2023-01-19 ENCOUNTER — OFFICE VISIT (OUTPATIENT)
Dept: PRIMARY CARE CLINIC | Facility: CLINIC | Age: 78
End: 2023-01-19
Payer: MEDICARE

## 2023-01-19 ENCOUNTER — TELEPHONE (OUTPATIENT)
Dept: PRIMARY CARE CLINIC | Facility: CLINIC | Age: 78
End: 2023-01-19
Payer: MEDICARE

## 2023-01-19 VITALS
HEART RATE: 74 BPM | RESPIRATION RATE: 18 BRPM | BODY MASS INDEX: 37.24 KG/M2 | HEIGHT: 71 IN | OXYGEN SATURATION: 96 % | DIASTOLIC BLOOD PRESSURE: 60 MMHG | SYSTOLIC BLOOD PRESSURE: 110 MMHG | WEIGHT: 266 LBS

## 2023-01-19 DIAGNOSIS — U07.1 COVID: ICD-10-CM

## 2023-01-19 DIAGNOSIS — I10 HYPERTENSION, UNSPECIFIED TYPE: ICD-10-CM

## 2023-01-19 DIAGNOSIS — E11.9 TYPE 2 DIABETES MELLITUS WITHOUT COMPLICATION, WITHOUT LONG-TERM CURRENT USE OF INSULIN: ICD-10-CM

## 2023-01-19 DIAGNOSIS — K57.30 DIVERTICULOSIS OF COLON: ICD-10-CM

## 2023-01-19 DIAGNOSIS — U07.1 COVID: Primary | ICD-10-CM

## 2023-01-19 DIAGNOSIS — E03.9 HYPOTHYROIDISM, UNSPECIFIED TYPE: ICD-10-CM

## 2023-01-19 PROCEDURE — 71046 X-RAY EXAM CHEST 2 VIEWS: CPT | Mod: TC

## 2023-01-19 PROCEDURE — 99214 PR OFFICE/OUTPT VISIT, EST, LEVL IV, 30-39 MIN: ICD-10-PCS | Mod: CR,,, | Performed by: FAMILY MEDICINE

## 2023-01-19 PROCEDURE — 71046 X-RAY EXAM CHEST 2 VIEWS: CPT | Mod: 26,CR,, | Performed by: RADIOLOGY

## 2023-01-19 PROCEDURE — 99214 OFFICE O/P EST MOD 30 MIN: CPT | Mod: CR,,, | Performed by: FAMILY MEDICINE

## 2023-01-19 PROCEDURE — 71046 XR CHEST PA AND LATERAL: ICD-10-PCS | Mod: 26,CR,, | Performed by: RADIOLOGY

## 2023-01-19 RX ORDER — PROMETHAZINE HYDROCHLORIDE 25 MG/1
25 TABLET ORAL EVERY 6 HOURS PRN
Qty: 25 TABLET | Refills: 1 | Status: SHIPPED | OUTPATIENT
Start: 2023-01-19 | End: 2023-02-14

## 2023-01-19 RX ORDER — CETIRIZINE HYDROCHLORIDE, PSEUDOEPHEDRINE HYDROCHLORIDE 5; 120 MG/1; MG/1
1 TABLET, FILM COATED, EXTENDED RELEASE ORAL 2 TIMES DAILY
COMMUNITY
Start: 2023-01-05 | End: 2023-02-21

## 2023-01-19 NOTE — TELEPHONE ENCOUNTER
----- Message from Timothy Solomon III, DO sent at 1/19/2023 12:41 PM CST -----  Looks good let know thx

## 2023-01-19 NOTE — TELEPHONE ENCOUNTER
----- Message from Timothy Solomon III, DO sent at 1/19/2023 12:11 PM CST -----  Labs good. Urine looks like he is a little dry. Increase fluids after taking nausea meds

## 2023-01-23 NOTE — PROGRESS NOTES
Subjective:      Patient ID: Diogenes Aguilar is a 77 y.o. male.    Chief Complaint: Nausea and feels bad (Postitive for Covid last week)    Diogenes Aguilar a 77 y.o. male presents for follow up on all regular problems which are reviewed and discussed.     Problem List Items Addressed This Visit          Cardiac/Vascular    Hypertension       Endocrine    Type 2 diabetes mellitus without complication, without long-term current use of insulin    Hypothyroidism       GI    Diverticulosis of colon     Other Visit Diagnoses       COVID    -  Primary    Relevant Orders    CBC Auto Differential (Completed)    Comprehensive Metabolic Panel (Completed)    Urinalysis (Completed)    Blood culture    X-Ray Chest PA And Lateral (Completed)            Past Medical History:  Past Medical History:   Diagnosis Date    Carcinoma of prostate     Diverticula, colon 8/19/2021    Essential hypertension     GERD (gastroesophageal reflux disease)     History of colon polyps 8/19/2021    Hyperlipidemia     Hypothyroidism     Obesity     Polyp of ascending colon 8/19/2021     Past Surgical History:   Procedure Laterality Date    COLONOSCOPY  08/23/2021    repeat 1 year    PROSTATECTOMY      UMBILICAL HERNIA REPAIR      x 3      Review of patient's allergies indicates:   Allergen Reactions    Opioids - morphine analogues      Current Outpatient Medications on File Prior to Visit   Medication Sig Dispense Refill    amLODIPine (NORVASC) 5 MG tablet Take 1 tablet (5 mg total) by mouth 2 (two) times daily. 180 tablet 3    cetirizine (ZYRTEC) 10 MG tablet Take 1 tablet (10 mg total) by mouth once daily. 90 tablet 4    cetirizine-pseudoephedrine 5-120 mg Tb12 Take 1 tablet by mouth 2 (two) times daily.      gemfibroziL (LOPID) 600 MG tablet Take 1 tablet (600 mg total) by mouth once daily. With food. 90 tablet 3    HYDROcodone-acetaminophen (NORCO)  mg per tablet Take 1 tablet by mouth every 6 (six) hours as needed for Pain. 120  "tablet 0    lisinopriL (PRINIVIL,ZESTRIL) 20 MG tablet Take 1 tablet (20 mg total) by mouth once daily. 90 tablet 3    omeprazole (PRILOSEC) 20 MG capsule Take 1 capsule (20 mg total) by mouth once daily. 90 capsule 3    pravastatin (PRAVACHOL) 80 MG tablet Take 1 tablet (80 mg total) by mouth once daily. 90 tablet 3     No current facility-administered medications on file prior to visit.     Social History     Socioeconomic History    Marital status:    Tobacco Use    Smoking status: Never    Smokeless tobacco: Never   Substance and Sexual Activity    Alcohol use: Yes     Comment: on occasion    Drug use: Never    Sexual activity: Not Currently     Family History   Problem Relation Age of Onset    Breast cancer Mother          at age 49 from breast CA with mets       Review of Systems   Constitutional:  Positive for appetite change.   HENT:  Negative for congestion, ear pain, nosebleeds and trouble swallowing.    Eyes:  Negative for pain and itching.   Respiratory:  Negative for chest tightness.    Cardiovascular:  Negative for chest pain.   Gastrointestinal:  Positive for nausea. Negative for abdominal distention.   Endocrine: Negative for cold intolerance and heat intolerance.   Genitourinary:  Negative for difficulty urinating.   Musculoskeletal:  Negative for arthralgias.   Neurological:  Negative for dizziness.     Objective:     /60 (BP Location: Left arm, Patient Position: Sitting, BP Method: Large (Manual))   Pulse 74   Resp 18   Ht 5' 11" (1.803 m)   Wt 120.7 kg (266 lb)   SpO2 96%   BMI 37.10 kg/m²     Physical Exam  Constitutional:       Appearance: Normal appearance. He is obese.   HENT:      Head: Normocephalic and atraumatic.      Right Ear: External ear normal.      Left Ear: External ear normal.      Nose: Nose normal.      Mouth/Throat:      Mouth: Mucous membranes are moist.      Pharynx: Oropharynx is clear.   Eyes:      Pupils: Pupils are equal, round, and " reactive to light.   Cardiovascular:      Rate and Rhythm: Normal rate and regular rhythm.      Heart sounds: Normal heart sounds. No murmur heard.    No gallop.   Pulmonary:      Effort: Pulmonary effort is normal. No respiratory distress.      Breath sounds: Normal breath sounds. No wheezing or rales.   Abdominal:      Palpations: Abdomen is soft.   Musculoskeletal:         General: Normal range of motion.      Cervical back: Normal range of motion and neck supple.   Skin:     General: Skin is warm and dry.      Coloration: Skin is not jaundiced.      Findings: No bruising or lesion.   Neurological:      General: No focal deficit present.      Mental Status: He is alert.   Psychiatric:         Mood and Affect: Mood normal.         Behavior: Behavior normal.         Thought Content: Thought content normal.         Judgment: Judgment normal.   Assessment:     1. COVID    2. Hypertension, unspecified type    3. Type 2 diabetes mellitus without complication, without long-term current use of insulin    4. Hypothyroidism, unspecified type    5. Diverticulosis of colon        Plan:     Problem List Items Addressed This Visit          Cardiac/Vascular    Hypertension       Endocrine    Type 2 diabetes mellitus without complication, without long-term current use of insulin    Hypothyroidism       GI    Diverticulosis of colon     Other Visit Diagnoses       COVID    -  Primary    Relevant Orders    CBC Auto Differential (Completed)    Comprehensive Metabolic Panel (Completed)    Urinalysis (Completed)    Blood culture    X-Ray Chest PA And Lateral (Completed)          No follow-ups on file.  Educate. Lab, xray  Has fu  discussed with wife    I am having DIOGENES Aguilar start on promethazine. I am also having him maintain his gemfibroziL, lisinopriL, amLODIPine, pravastatin, omeprazole, cetirizine, HYDROcodone-acetaminophen, and cetirizine-pseudoephedrine.    Diogenes was seen today for nausea and feels bad.    Diagnoses and all  orders for this visit:    COVID  -     CBC Auto Differential; Future  -     Comprehensive Metabolic Panel; Future  -     Urinalysis; Future  -     Blood culture; Future  -     X-Ray Chest PA And Lateral; Future    Hypertension, unspecified type    Type 2 diabetes mellitus without complication, without long-term current use of insulin    Hypothyroidism, unspecified type    Diverticulosis of colon    Other orders  -     promethazine (PHENERGAN) 25 MG tablet; Take 1 tablet (25 mg total) by mouth every 6 (six) hours as needed for Nausea.      Medications Ordered This Encounter   Medications    promethazine (PHENERGAN) 25 MG tablet     Sig: Take 1 tablet (25 mg total) by mouth every 6 (six) hours as needed for Nausea.     Dispense:  25 tablet     Refill:  1     [unfilled]  Orders Placed This Encounter   Procedures    Blood culture     Standing Status:   Future     Number of Occurrences:   1     Standing Expiration Date:   3/19/2024    X-Ray Chest PA And Lateral     Standing Status:   Future     Number of Occurrences:   1     Standing Expiration Date:   1/19/2024     Order Specific Question:   May the Radiologist modify the order per protocol to meet the clinical needs of the patient?     Answer:   Yes     Order Specific Question:   Release to patient     Answer:   Immediate    CBC Auto Differential     Standing Status:   Future     Number of Occurrences:   1     Standing Expiration Date:   3/19/2024    Comprehensive Metabolic Panel     Standing Status:   Future     Number of Occurrences:   1     Standing Expiration Date:   3/19/2024    Urinalysis     Standing Status:   Future     Number of Occurrences:   1     Standing Expiration Date:   3/19/2024     Order Specific Question:   Collection Type     Answer:   Urine, Clean Catch

## 2023-01-31 ENCOUNTER — EXTERNAL CHRONIC CARE MANAGEMENT (OUTPATIENT)
Dept: PRIMARY CARE CLINIC | Facility: CLINIC | Age: 78
End: 2023-01-31
Payer: MEDICARE

## 2023-01-31 PROCEDURE — G0511 PR CHRONIC CARE MGMT, RHC OR FQHC ONLY, 20 MINS OR MORE: ICD-10-PCS | Mod: ,,, | Performed by: FAMILY MEDICINE

## 2023-01-31 PROCEDURE — G0511 CCM/BHI BY RHC/FQHC 20MIN MO: HCPCS | Mod: ,,, | Performed by: FAMILY MEDICINE

## 2023-02-06 ENCOUNTER — TELEPHONE (OUTPATIENT)
Dept: GASTROENTEROLOGY | Facility: CLINIC | Age: 78
End: 2023-02-06
Payer: MEDICARE

## 2023-02-06 DIAGNOSIS — K21.9 GASTROESOPHAGEAL REFLUX DISEASE, UNSPECIFIED WHETHER ESOPHAGITIS PRESENT: Primary | ICD-10-CM

## 2023-02-13 RX ORDER — HYDROCODONE BITARTRATE AND ACETAMINOPHEN 10; 325 MG/1; MG/1
1 TABLET ORAL EVERY 6 HOURS PRN
Qty: 120 TABLET | Refills: 0 | Status: SHIPPED | OUTPATIENT
Start: 2023-02-13 | End: 2023-10-16 | Stop reason: SDUPTHER

## 2023-02-21 ENCOUNTER — HOSPITAL ENCOUNTER (OUTPATIENT)
Dept: GASTROENTEROLOGY | Facility: HOSPITAL | Age: 78
Discharge: HOME | DRG: 337 | End: 2023-02-21
Attending: INTERNAL MEDICINE
Payer: MEDICARE

## 2023-02-21 ENCOUNTER — ANESTHESIA EVENT (OUTPATIENT)
Dept: GASTROENTEROLOGY | Facility: HOSPITAL | Age: 78
DRG: 337 | End: 2023-02-21
Payer: MEDICARE

## 2023-02-21 ENCOUNTER — ANESTHESIA (OUTPATIENT)
Dept: GASTROENTEROLOGY | Facility: HOSPITAL | Age: 78
DRG: 337 | End: 2023-02-21
Payer: MEDICARE

## 2023-02-21 VITALS
DIASTOLIC BLOOD PRESSURE: 78 MMHG | SYSTOLIC BLOOD PRESSURE: 122 MMHG | HEART RATE: 78 BPM | OXYGEN SATURATION: 96 % | RESPIRATION RATE: 13 BRPM

## 2023-02-21 DIAGNOSIS — K29.00 ACUTE SUPERFICIAL GASTRITIS WITHOUT HEMORRHAGE: Primary | ICD-10-CM

## 2023-02-21 DIAGNOSIS — K44.9 HH (HIATUS HERNIA): ICD-10-CM

## 2023-02-21 DIAGNOSIS — K21.9 GASTROESOPHAGEAL REFLUX DISEASE, UNSPECIFIED WHETHER ESOPHAGITIS PRESENT: ICD-10-CM

## 2023-02-21 PROCEDURE — 27201423 OPTIME MED/SURG SUP & DEVICES STERILE SUPPLY

## 2023-02-21 PROCEDURE — 43239 EGD BIOPSY SINGLE/MULTIPLE: CPT | Mod: ,,, | Performed by: INTERNAL MEDICINE

## 2023-02-21 PROCEDURE — 88305 TISSUE EXAM BY PATHOLOGIST: CPT | Mod: TC,SUR | Performed by: INTERNAL MEDICINE

## 2023-02-21 PROCEDURE — 37000008 HC ANESTHESIA 1ST 15 MINUTES

## 2023-02-21 PROCEDURE — 88305 SURGICAL PATHOLOGY: ICD-10-PCS | Mod: 26,,, | Performed by: PATHOLOGY

## 2023-02-21 PROCEDURE — 88342 IMHCHEM/IMCYTCHM 1ST ANTB: CPT | Mod: 26,,, | Performed by: PATHOLOGY

## 2023-02-21 PROCEDURE — 88305 TISSUE EXAM BY PATHOLOGIST: CPT | Mod: 26,,, | Performed by: PATHOLOGY

## 2023-02-21 PROCEDURE — 63600175 PHARM REV CODE 636 W HCPCS: Performed by: NURSE ANESTHETIST, CERTIFIED REGISTERED

## 2023-02-21 PROCEDURE — D9220A PRA ANESTHESIA: ICD-10-PCS | Mod: ,,, | Performed by: NURSE ANESTHETIST, CERTIFIED REGISTERED

## 2023-02-21 PROCEDURE — 43239 EGD BIOPSY SINGLE/MULTIPLE: CPT | Performed by: INTERNAL MEDICINE

## 2023-02-21 PROCEDURE — 43239 PR EGD, FLEX, W/BIOPSY, SGL/MULTI: ICD-10-PCS | Mod: ,,, | Performed by: INTERNAL MEDICINE

## 2023-02-21 PROCEDURE — D9220A PRA ANESTHESIA: Mod: ,,, | Performed by: NURSE ANESTHETIST, CERTIFIED REGISTERED

## 2023-02-21 PROCEDURE — 88342 SURGICAL PATHOLOGY: ICD-10-PCS | Mod: 26,,, | Performed by: PATHOLOGY

## 2023-02-21 PROCEDURE — 25000003 PHARM REV CODE 250: Performed by: NURSE ANESTHETIST, CERTIFIED REGISTERED

## 2023-02-21 RX ORDER — PROPOFOL 10 MG/ML
INJECTION, EMULSION INTRAVENOUS
Status: DISCONTINUED | OUTPATIENT
Start: 2023-02-21 | End: 2023-02-21

## 2023-02-21 RX ORDER — SODIUM CHLORIDE 0.9 % (FLUSH) 0.9 %
10 SYRINGE (ML) INJECTION
Status: DISCONTINUED | OUTPATIENT
Start: 2023-02-21 | End: 2023-02-22 | Stop reason: HOSPADM

## 2023-02-21 RX ORDER — LIDOCAINE HYDROCHLORIDE 20 MG/ML
INJECTION, SOLUTION EPIDURAL; INFILTRATION; INTRACAUDAL; PERINEURAL
Status: DISCONTINUED | OUTPATIENT
Start: 2023-02-21 | End: 2023-02-21

## 2023-02-21 RX ADMIN — PROPOFOL 30 MG: 10 INJECTION, EMULSION INTRAVENOUS at 02:02

## 2023-02-21 RX ADMIN — PROPOFOL 50 MG: 10 INJECTION, EMULSION INTRAVENOUS at 02:02

## 2023-02-21 RX ADMIN — SODIUM CHLORIDE: 9 INJECTION, SOLUTION INTRAVENOUS at 02:02

## 2023-02-21 RX ADMIN — LIDOCAINE HYDROCHLORIDE 70 MG: 20 INJECTION, SOLUTION INTRAVENOUS at 02:02

## 2023-02-21 NOTE — ANESTHESIA PREPROCEDURE EVALUATION
02/21/2023  Diogenes Aguilar is a 78 y.o., male.      Pre-op Assessment    I have reviewed the Patient Summary Reports.     I have reviewed the Nursing Notes. I have reviewed the NPO Status.   I have reviewed the Medications.     Review of Systems  Anesthesia Hx:  No problems with previous Anesthesia    Cardiovascular:   Hypertension    Hepatic/GI:   GERD    Musculoskeletal:   Arthritis     Endocrine:   Hypothyroidism  Obesity / BMI > 30      Physical Exam  General: Alert, Oriented and Well nourished    Airway:  Mallampati: III   Mouth Opening: Normal  TM Distance: Normal  Tongue: Normal  Neck ROM: Normal ROM    Dental:  Intact        Anesthesia Plan  Type of Anesthesia, risks & benefits discussed:    Anesthesia Type: Gen Natural Airway  Intra-op Monitoring Plan: Standard ASA Monitors  Post Op Pain Control Plan: multimodal analgesia  Induction:  IV  Informed Consent: Informed consent signed with the Patient and all parties understand the risks and agree with anesthesia plan.  All questions answered. Patient consented to blood products? Yes  ASA Score: 3  Day of Surgery Review of History & Physical: H&P Update referred to the surgeon/provider.    Ready For Surgery From Anesthesia Perspective.     .       Please review. If Ok, please approve.  Last OV 1/24/22  Last refill date 1/14/22 #90 x 0R

## 2023-02-21 NOTE — H&P
[facility]  Gastroenterology  H&P    Patient Name: Diogenes Aguilar  MRN: [id number]  Admission Date: 2/21/2023  Code Status: Full Code    Attending Provider: Rohan Raman MD   Primary Care Physician: Timothy Solomon III, DO  Principal Problem:<principal problem not specified>    Subjective:     History of Present Illness: Pt c/o gerd and llq pain with constipation.    Past Medical History:   Diagnosis Date    Carcinoma of prostate     Diverticula, colon 8/19/2021    Essential hypertension     GERD (gastroesophageal reflux disease)     History of colon polyps 8/19/2021    Hyperlipidemia     Hypothyroidism     Obesity     Polyp of ascending colon 8/19/2021       Past Surgical History:   Procedure Laterality Date    ABDOMINAL SURGERY N/A     blockage of the bowels    COLONOSCOPY  08/23/2021    repeat 1 year    PROSTATECTOMY      UMBILICAL HERNIA REPAIR      x 3        Review of patient's allergies indicates:   Allergen Reactions    Opioids - morphine analogues      Family History       Problem Relation (Age of Onset)    Breast cancer Mother          Tobacco Use    Smoking status: Never    Smokeless tobacco: Never   Substance and Sexual Activity    Alcohol use: Yes     Comment: on occasion    Drug use: Never    Sexual activity: Not Currently     Review of Systems   Respiratory: Negative.     Cardiovascular: Negative.    Gastrointestinal:  Positive for abdominal pain and constipation.   Objective:     Vital Signs (Most Recent):  Pulse: 90 (02/21/23 1310)  Resp: (!) 22 (02/21/23 1310)  BP: 119/74 (02/21/23 1310)  SpO2: 95 % (02/21/23 1310)   Vital Signs (24h Range):  Pulse:  [90] 90  Resp:  [22] 22  SpO2:  [95 %] 95 %  BP: (119)/(74) 119/74        There is no height or weight on file to calculate BMI.    No intake or output data in the 24 hours ending 02/21/23 1432    Lines/Drains/Airways       Peripheral Intravenous Line  Duration                  Peripheral IV - Single Lumen 02/21/23 1317 22 G  Anterior;Distal;Right Upper Arm <1 day                    Physical Exam  Vitals reviewed.   Constitutional:       General: He is not in acute distress.     Appearance: Normal appearance. He is well-developed. He is not ill-appearing.   HENT:      Head: Normocephalic and atraumatic.      Nose: Nose normal.   Eyes:      Pupils: Pupils are equal, round, and reactive to light.   Cardiovascular:      Rate and Rhythm: Normal rate and regular rhythm.   Pulmonary:      Effort: Pulmonary effort is normal.      Breath sounds: Normal breath sounds. No wheezing.   Abdominal:      General: Abdomen is flat. Bowel sounds are normal. There is no distension.      Palpations: Abdomen is soft.      Tenderness: There is no abdominal tenderness. There is no guarding.   Skin:     General: Skin is warm and dry.      Coloration: Skin is not jaundiced.   Neurological:      Mental Status: He is alert.   Psychiatric:         Attention and Perception: Attention normal.         Mood and Affect: Affect normal.         Speech: Speech normal.         Behavior: Behavior is cooperative.      Comments: Pt was calm while speaking.       Significant Labs:  CBC: No results for input(s): WBC, HGB, HCT, PLT in the last 48 hours.  CMP: No results for input(s): GLU, CALCIUM, ALBUMIN, PROT, NA, K, CO2, CL, BUN, CREATININE, ALKPHOS, ALT, AST, BILITOT in the last 48 hours.    Significant Imaging:  Imaging results within the past 24 hours have been reviewed.    Assessment/Plan:     There are no hospital problems to display for this patient.        Imp: gerd, llq pain, constipation  Plan: egd    Rohan Raman MD  Gastroenterology  [facility]

## 2023-02-21 NOTE — TRANSFER OF CARE
Anesthesia Transfer of Care Note    Patient: Diogenes Aguilar    Procedure(s) Performed: * No procedures listed *    Patient location: PACU    Anesthesia Type: general    Transport from OR: Transported from OR on room air with adequate spontaneous ventilation    Post pain: adequate analgesia    Post assessment: no apparent anesthetic complications    Post vital signs: stable    Level of consciousness: alert and responds to stimulation    Nausea/Vomiting: no nausea/vomiting    Complications: none    Transfer of care protocol was followed      Last vitals:   Visit Vitals  /74   Pulse 90   Resp 10   SpO2 96%

## 2023-02-21 NOTE — ANESTHESIA POSTPROCEDURE EVALUATION
Anesthesia Post Evaluation    Patient: Diogenes Aguilar    Procedure(s) Performed: * No procedures listed *    Final Anesthesia Type: general      Patient location during evaluation: PACU  Patient participation: Yes- Able to Participate  Level of consciousness: awake and alert and oriented  Post-procedure vital signs: reviewed and stable  Pain management: adequate  Airway patency: patent    PONV status at discharge: No PONV  Anesthetic complications: no      Cardiovascular status: blood pressure returned to baseline and hemodynamically stable  Respiratory status: unassisted  Hydration status: euvolemic  Follow-up not needed.          Vitals Value Taken Time   /72 02/21/23 1448   Temp  02/21/23 1451   Pulse 80 02/21/23 1450   Resp 14 02/21/23 1450   SpO2 94 % 02/21/23 1450   Vitals shown include unvalidated device data.      No case tracking events are documented in the log.      Pain/Paramjit Score: Paramjit Score: 8 (2/21/2023  2:42 PM)

## 2023-02-21 NOTE — DISCHARGE INSTRUCTIONS
Procedure Date  2/21/23     Impression  Overall Impression: Mucosa of the esophagus shows prominent veins in the proximal esophagus, but no varices distally. The distal esophagus was biopsied. The stomach had gastric erythema without ulcers. A 2cm hiatus hernia was noted. The stomach was biopsied. The duodenum had normal mucosa.     Recommendation  Await pathology results is recommended.  Avoid nsaids; ppi 1/AM; start miralax q day and return to GI clinic with FARIDA Hackett to f/u constipation and llq pain.  Indication  Gastroesophageal reflux disease, unspecified whether esophagitis present   THE NURSE WILL CALL YOU WITH YOUR BIOPSY RESULTS IN A FEW DAYS.   NO DRIVING, OPERATING EQUIPMENT, OR SIGNING LEGAL DOCUMENTS FOR 24 HOURS.

## 2023-02-22 ENCOUNTER — HOSPITAL ENCOUNTER (INPATIENT)
Facility: HOSPITAL | Age: 78
LOS: 6 days | Discharge: HOME | DRG: 337 | End: 2023-03-01
Attending: EMERGENCY MEDICINE | Admitting: SURGERY
Payer: MEDICARE

## 2023-02-22 DIAGNOSIS — K56.609 SMALL BOWEL OBSTRUCTION: Primary | ICD-10-CM

## 2023-02-22 DIAGNOSIS — R11.0 NAUSEA: ICD-10-CM

## 2023-02-22 DIAGNOSIS — K29.00 ACUTE SUPERFICIAL GASTRITIS WITHOUT HEMORRHAGE: Primary | ICD-10-CM

## 2023-02-22 DIAGNOSIS — K56.600 PARTIAL SMALL BOWEL OBSTRUCTION: ICD-10-CM

## 2023-02-22 LAB
ALBUMIN SERPL BCP-MCNC: 4.4 G/DL (ref 3.5–5)
ALBUMIN/GLOB SERPL: 1 {RATIO}
ALP SERPL-CCNC: 82 U/L (ref 45–115)
ALT SERPL W P-5'-P-CCNC: 13 U/L (ref 16–61)
AMYLASE SERPL-CCNC: 81 U/L (ref 25–115)
ANION GAP SERPL CALCULATED.3IONS-SCNC: 18 MMOL/L (ref 7–16)
APTT PPP: 34.9 SECONDS (ref 25.2–37.3)
AST SERPL W P-5'-P-CCNC: 25 U/L (ref 15–37)
BASOPHILS # BLD AUTO: 0.06 K/UL (ref 0–0.2)
BASOPHILS NFR BLD AUTO: 0.5 % (ref 0–1)
BILIRUB SERPL-MCNC: 0.6 MG/DL (ref ?–1.2)
BUN SERPL-MCNC: 17 MG/DL (ref 7–18)
BUN/CREAT SERPL: 18 (ref 6–20)
CALCIUM SERPL-MCNC: 9.9 MG/DL (ref 8.5–10.1)
CHLORIDE SERPL-SCNC: 100 MMOL/L (ref 98–107)
CO2 SERPL-SCNC: 25 MMOL/L (ref 21–32)
CREAT SERPL-MCNC: 0.92 MG/DL (ref 0.7–1.3)
DHEA SERPL-MCNC: NORMAL
DIFFERENTIAL METHOD BLD: ABNORMAL
EGFR (NO RACE VARIABLE) (RUSH/TITUS): 86 ML/MIN/1.73M²
EOSINOPHIL # BLD AUTO: 0.01 K/UL (ref 0–0.5)
EOSINOPHIL NFR BLD AUTO: 0.1 % (ref 1–4)
ERYTHROCYTE [DISTWIDTH] IN BLOOD BY AUTOMATED COUNT: 13.8 % (ref 11.5–14.5)
ESTROGEN SERPL-MCNC: NORMAL PG/ML
GLOBULIN SER-MCNC: 4.5 G/DL (ref 2–4)
GLUCOSE SERPL-MCNC: 114 MG/DL (ref 74–106)
HCT VFR BLD AUTO: 47.5 % (ref 40–54)
HGB BLD-MCNC: 16.3 G/DL (ref 13.5–18)
IMM GRANULOCYTES # BLD AUTO: 0.05 K/UL (ref 0–0.04)
IMM GRANULOCYTES NFR BLD: 0.4 % (ref 0–0.4)
INR BLD: 1.05
INSULIN SERPL-ACNC: NORMAL U[IU]/ML
LAB AP GROSS DESCRIPTION: NORMAL
LAB AP LABORATORY NOTES: NORMAL
LACTATE SERPL-SCNC: 1.2 MMOL/L (ref 0.4–2)
LIPASE SERPL-CCNC: 97 U/L (ref 73–393)
LYMPHOCYTES # BLD AUTO: 1.57 K/UL (ref 1–4.8)
LYMPHOCYTES NFR BLD AUTO: 11.9 % (ref 27–41)
MAGNESIUM SERPL-MCNC: 2.1 MG/DL (ref 1.7–2.3)
MCH RBC QN AUTO: 30 PG (ref 27–31)
MCHC RBC AUTO-ENTMCNC: 34.3 G/DL (ref 32–36)
MCV RBC AUTO: 87.3 FL (ref 80–96)
MONOCYTES # BLD AUTO: 0.71 K/UL (ref 0–0.8)
MONOCYTES NFR BLD AUTO: 5.4 % (ref 2–6)
MPC BLD CALC-MCNC: 8.7 FL (ref 9.4–12.4)
NEUTROPHILS # BLD AUTO: 10.83 K/UL (ref 1.8–7.7)
NEUTROPHILS NFR BLD AUTO: 81.7 % (ref 53–65)
NRBC # BLD AUTO: 0 X10E3/UL
NRBC, AUTO (.00): 0 %
PLATELET # BLD AUTO: 312 K/UL (ref 150–400)
POTASSIUM SERPL-SCNC: 3.8 MMOL/L (ref 3.5–5.1)
PROT SERPL-MCNC: 8.9 G/DL (ref 6.4–8.2)
PROTHROMBIN TIME: 13.3 SECONDS (ref 11.7–14.7)
RBC # BLD AUTO: 5.44 M/UL (ref 4.6–6.2)
SODIUM SERPL-SCNC: 139 MMOL/L (ref 136–145)
T3RU NFR SERPL: NORMAL %
TROPONIN I SERPL HS-MCNC: 6.7 PG/ML
WBC # BLD AUTO: 13.23 K/UL (ref 4.5–11)

## 2023-02-22 PROCEDURE — 80053 COMPREHEN METABOLIC PANEL: CPT | Performed by: EMERGENCY MEDICINE

## 2023-02-22 PROCEDURE — 96374 THER/PROPH/DIAG INJ IV PUSH: CPT

## 2023-02-22 PROCEDURE — 85730 THROMBOPLASTIN TIME PARTIAL: CPT | Performed by: EMERGENCY MEDICINE

## 2023-02-22 PROCEDURE — 82150 ASSAY OF AMYLASE: CPT | Performed by: EMERGENCY MEDICINE

## 2023-02-22 PROCEDURE — 83605 ASSAY OF LACTIC ACID: CPT | Performed by: EMERGENCY MEDICINE

## 2023-02-22 PROCEDURE — 85025 COMPLETE CBC W/AUTO DIFF WBC: CPT | Performed by: EMERGENCY MEDICINE

## 2023-02-22 PROCEDURE — 99285 EMERGENCY DEPT VISIT HI MDM: CPT | Mod: ,,, | Performed by: EMERGENCY MEDICINE

## 2023-02-22 PROCEDURE — 93010 ELECTROCARDIOGRAM REPORT: CPT | Mod: ,,, | Performed by: HOSPITALIST

## 2023-02-22 PROCEDURE — 93005 ELECTROCARDIOGRAM TRACING: CPT

## 2023-02-22 PROCEDURE — 99285 EMERGENCY DEPT VISIT HI MDM: CPT | Mod: 25

## 2023-02-22 PROCEDURE — 83735 ASSAY OF MAGNESIUM: CPT | Performed by: EMERGENCY MEDICINE

## 2023-02-22 PROCEDURE — 63600175 PHARM REV CODE 636 W HCPCS: Performed by: EMERGENCY MEDICINE

## 2023-02-22 PROCEDURE — 85610 PROTHROMBIN TIME: CPT | Performed by: EMERGENCY MEDICINE

## 2023-02-22 PROCEDURE — 93010 EKG 12-LEAD: ICD-10-PCS | Mod: ,,, | Performed by: HOSPITALIST

## 2023-02-22 PROCEDURE — 25500020 PHARM REV CODE 255: Performed by: EMERGENCY MEDICINE

## 2023-02-22 PROCEDURE — 83690 ASSAY OF LIPASE: CPT | Performed by: EMERGENCY MEDICINE

## 2023-02-22 PROCEDURE — 99285 PR EMERGENCY DEPT VISIT,LEVEL V: ICD-10-PCS | Mod: ,,, | Performed by: EMERGENCY MEDICINE

## 2023-02-22 PROCEDURE — 84484 ASSAY OF TROPONIN QUANT: CPT | Performed by: EMERGENCY MEDICINE

## 2023-02-22 RX ORDER — PROCHLORPERAZINE EDISYLATE 5 MG/ML
10 INJECTION INTRAMUSCULAR; INTRAVENOUS
Status: COMPLETED | OUTPATIENT
Start: 2023-02-22 | End: 2023-02-22

## 2023-02-22 RX ADMIN — IOPAMIDOL 100 ML: 755 INJECTION, SOLUTION INTRAVENOUS at 11:02

## 2023-02-22 RX ADMIN — PROCHLORPERAZINE EDISYLATE 10 MG: 5 INJECTION INTRAMUSCULAR; INTRAVENOUS at 09:02

## 2023-02-23 PROBLEM — R11.0 NAUSEA: Status: ACTIVE | Noted: 2023-02-23

## 2023-02-23 PROBLEM — K56.600 PARTIAL SMALL BOWEL OBSTRUCTION: Status: ACTIVE | Noted: 2023-02-23

## 2023-02-23 LAB
AMPHET UR QL SCN: NEGATIVE
BACTERIA #/AREA URNS HPF: ABNORMAL /HPF
BARBITURATES UR QL SCN: NEGATIVE
BENZODIAZ METAB UR QL SCN: NEGATIVE
BILIRUB UR QL STRIP: NEGATIVE
CANNABINOIDS UR QL SCN: NEGATIVE
CLARITY UR: CLEAR
COCAINE UR QL SCN: NEGATIVE
COLOR UR: YELLOW
GLUCOSE UR STRIP-MCNC: NORMAL MG/DL
KETONES UR STRIP-SCNC: 20 MG/DL
LEUKOCYTE ESTERASE UR QL STRIP: NEGATIVE
MUCOUS THREADS #/AREA URNS HPF: ABNORMAL /HPF
NITRITE UR QL STRIP: NEGATIVE
OPIATES UR QL SCN: POSITIVE
PCP UR QL SCN: NEGATIVE
PH UR STRIP: 5.5 PH UNITS
PROT UR QL STRIP: 30
RBC # UR STRIP: NEGATIVE /UL
RBC #/AREA URNS HPF: ABNORMAL /HPF
RENAL EPI CELLS #/AREA URNS LPF: ABNORMAL /LPF
SP GR UR STRIP: <=1.03
SQUAMOUS #/AREA URNS LPF: ABNORMAL /LPF
TRICHOMONAS #/AREA URNS HPF: ABNORMAL /HPF
UROBILINOGEN UR STRIP-ACNC: NORMAL MG/DL
WBC #/AREA URNS HPF: ABNORMAL /HPF
YEAST #/AREA URNS HPF: ABNORMAL /HPF

## 2023-02-23 PROCEDURE — 80307 DRUG TEST PRSMV CHEM ANLYZR: CPT | Performed by: EMERGENCY MEDICINE

## 2023-02-23 PROCEDURE — 63600175 PHARM REV CODE 636 W HCPCS: Performed by: EMERGENCY MEDICINE

## 2023-02-23 PROCEDURE — 63600175 PHARM REV CODE 636 W HCPCS: Performed by: NURSE PRACTITIONER

## 2023-02-23 PROCEDURE — 11000001 HC ACUTE MED/SURG PRIVATE ROOM

## 2023-02-23 PROCEDURE — 25000003 PHARM REV CODE 250: Performed by: EMERGENCY MEDICINE

## 2023-02-23 PROCEDURE — 25000003 PHARM REV CODE 250: Performed by: NURSE PRACTITIONER

## 2023-02-23 PROCEDURE — 63600175 PHARM REV CODE 636 W HCPCS: Performed by: SURGERY

## 2023-02-23 PROCEDURE — 25500020 PHARM REV CODE 255: Performed by: SURGERY

## 2023-02-23 PROCEDURE — 99222 1ST HOSP IP/OBS MODERATE 55: CPT | Mod: ,,, | Performed by: NURSE PRACTITIONER

## 2023-02-23 PROCEDURE — 81001 URINALYSIS AUTO W/SCOPE: CPT | Performed by: EMERGENCY MEDICINE

## 2023-02-23 PROCEDURE — 99222 PR INITIAL HOSPITAL CARE,LEVL II: ICD-10-PCS | Mod: ,,, | Performed by: NURSE PRACTITIONER

## 2023-02-23 PROCEDURE — 94761 N-INVAS EAR/PLS OXIMETRY MLT: CPT

## 2023-02-23 RX ORDER — TALC
6 POWDER (GRAM) TOPICAL NIGHTLY PRN
Status: DISCONTINUED | OUTPATIENT
Start: 2023-02-23 | End: 2023-03-01 | Stop reason: HOSPADM

## 2023-02-23 RX ORDER — PROMETHAZINE HYDROCHLORIDE 25 MG/1
25 TABLET ORAL EVERY 6 HOURS PRN
Status: DISCONTINUED | OUTPATIENT
Start: 2023-02-23 | End: 2023-03-01 | Stop reason: HOSPADM

## 2023-02-23 RX ORDER — SODIUM CHLORIDE 0.9 % (FLUSH) 0.9 %
10 SYRINGE (ML) INJECTION
Status: DISCONTINUED | OUTPATIENT
Start: 2023-02-23 | End: 2023-03-01 | Stop reason: HOSPADM

## 2023-02-23 RX ORDER — LISINOPRIL 20 MG/1
20 TABLET ORAL DAILY
Status: DISCONTINUED | OUTPATIENT
Start: 2023-02-23 | End: 2023-03-01 | Stop reason: HOSPADM

## 2023-02-23 RX ORDER — ENOXAPARIN SODIUM 100 MG/ML
40 INJECTION SUBCUTANEOUS EVERY 24 HOURS
Status: DISCONTINUED | OUTPATIENT
Start: 2023-02-23 | End: 2023-03-01 | Stop reason: HOSPADM

## 2023-02-23 RX ORDER — SODIUM CHLORIDE 9 MG/ML
INJECTION, SOLUTION INTRAVENOUS CONTINUOUS
Status: DISCONTINUED | OUTPATIENT
Start: 2023-02-23 | End: 2023-02-28

## 2023-02-23 RX ORDER — HYDROCODONE BITARTRATE AND ACETAMINOPHEN 10; 325 MG/1; MG/1
1 TABLET ORAL EVERY 6 HOURS PRN
Status: DISCONTINUED | OUTPATIENT
Start: 2023-02-23 | End: 2023-03-01 | Stop reason: HOSPADM

## 2023-02-23 RX ORDER — MORPHINE SULFATE 4 MG/ML
4 INJECTION, SOLUTION INTRAMUSCULAR; INTRAVENOUS EVERY 4 HOURS PRN
Status: DISCONTINUED | OUTPATIENT
Start: 2023-02-23 | End: 2023-03-01 | Stop reason: HOSPADM

## 2023-02-23 RX ORDER — AMLODIPINE BESYLATE 5 MG/1
5 TABLET ORAL 2 TIMES DAILY
Status: DISCONTINUED | OUTPATIENT
Start: 2023-02-23 | End: 2023-03-01 | Stop reason: HOSPADM

## 2023-02-23 RX ORDER — KETOROLAC TROMETHAMINE 15 MG/ML
15 INJECTION, SOLUTION INTRAMUSCULAR; INTRAVENOUS EVERY 6 HOURS PRN
Status: DISPENSED | OUTPATIENT
Start: 2023-02-23 | End: 2023-02-26

## 2023-02-23 RX ORDER — ONDANSETRON 2 MG/ML
4 INJECTION INTRAMUSCULAR; INTRAVENOUS EVERY 8 HOURS PRN
Status: DISCONTINUED | OUTPATIENT
Start: 2023-02-23 | End: 2023-03-01 | Stop reason: HOSPADM

## 2023-02-23 RX ADMIN — SODIUM CHLORIDE, POTASSIUM CHLORIDE, SODIUM LACTATE AND CALCIUM CHLORIDE 1000 ML: 600; 310; 30; 20 INJECTION, SOLUTION INTRAVENOUS at 08:02

## 2023-02-23 RX ADMIN — SODIUM CHLORIDE: 9 INJECTION, SOLUTION INTRAVENOUS at 11:02

## 2023-02-23 RX ADMIN — DIATRIZOATE MEGLUMINE AND DIATRIZOATE SODIUM 120 ML: 660; 100 LIQUID ORAL; RECTAL at 02:02

## 2023-02-23 RX ADMIN — PROMETHAZINE HYDROCHLORIDE 25 MG: 25 TABLET ORAL at 05:02

## 2023-02-23 RX ADMIN — PROMETHAZINE HYDROCHLORIDE 25 MG: 25 TABLET ORAL at 10:02

## 2023-02-23 RX ADMIN — ENOXAPARIN SODIUM 40 MG: 100 INJECTION SUBCUTANEOUS at 05:02

## 2023-02-23 RX ADMIN — SODIUM CHLORIDE: 9 INJECTION, SOLUTION INTRAVENOUS at 03:02

## 2023-02-23 RX ADMIN — LISINOPRIL 20 MG: 20 TABLET ORAL at 10:02

## 2023-02-23 RX ADMIN — AMLODIPINE BESYLATE 5 MG: 5 TABLET ORAL at 10:02

## 2023-02-23 RX ADMIN — AMLODIPINE BESYLATE 5 MG: 5 TABLET ORAL at 08:02

## 2023-02-23 RX ADMIN — ONDANSETRON HYDROCHLORIDE 4 MG: 2 SOLUTION INTRAMUSCULAR; INTRAVENOUS at 12:02

## 2023-02-23 NOTE — HPI
Remote history of bowel obstruction requiring laparotomy with lysis of adhesions by Dr. Beach  Subsequent hernia repair laparoscopically with biologic mesh per Dr. Recinos 10 years ago  Patient with constipation abdominal discomfort significant weight loss since January  Colonoscopy 3 months ago per Dr. Huerta with findings of diverticulosis rectal polypectomy 2 days ago EGD with gastritis no ulcers small hiatal hernia  Pain has progressed with nausea vomiting yesterday presented to the emergency department  Findings of mild leukocytosis 13,000  CT scan concerning for dilated loops of small bowel partial small-bowel obstruction  Failed attempt at ngt insertion due to nose bleed  PMH:  HTN

## 2023-02-23 NOTE — PLAN OF CARE
Problem: Adult Inpatient Plan of Care  Goal: Plan of Care Review  Outcome: Ongoing, Progressing  Flowsheets (Taken 2/23/2023 0430)  Plan of Care Reviewed With:   patient   spouse  Goal: Patient-Specific Goal (Individualized)  Outcome: Ongoing, Progressing  Goal: Absence of Hospital-Acquired Illness or Injury  Outcome: Ongoing, Progressing  Intervention: Identify and Manage Fall Risk  Flowsheets (Taken 2/23/2023 0430)  Safety Promotion/Fall Prevention:   assistive device/personal item within reach   lighting adjusted   medications reviewed   room near unit station   side rails raised x 3   instructed to call staff for mobility  Intervention: Prevent Skin Injury  Flowsheets (Taken 2/23/2023 0430)  Body Position:   position changed independently   supine  Skin Protection: adhesive use limited  Intervention: Prevent and Manage VTE (Venous Thromboembolism) Risk  Flowsheets (Taken 2/23/2023 0430)  Activity Management: Ambulated -L4  VTE Prevention/Management:   ambulation promoted   ROM (active) performed  Range of Motion: ROM (range of motion) performed  Intervention: Prevent Infection  Flowsheets (Taken 2/23/2023 0430)  Infection Prevention:   equipment surfaces disinfected   hand hygiene promoted   rest/sleep promoted   single patient room provided  Goal: Optimal Comfort and Wellbeing  Outcome: Ongoing, Progressing  Intervention: Monitor Pain and Promote Comfort  Flowsheets (Taken 2/23/2023 0430)  Pain Management Interventions:   care clustered   diversional activity provided   quiet environment facilitated   relaxation techniques promoted  Intervention: Provide Person-Centered Care  Flowsheets (Taken 2/23/2023 0430)  Trust Relationship/Rapport:   care explained   choices provided   questions answered   questions encouraged  Goal: Readiness for Transition of Care  Outcome: Ongoing, Progressing     Problem: Diabetes Comorbidity  Goal: Blood Glucose Level Within Targeted Range  Outcome: Ongoing, Progressing      Problem: Fall Injury Risk  Goal: Absence of Fall and Fall-Related Injury  Outcome: Ongoing, Progressing  Intervention: Identify and Manage Contributors  Flowsheets (Taken 2/23/2023 0430)  Self-Care Promotion: BADL personal routines maintained  Medication Review/Management: medications reviewed  Intervention: Promote Injury-Free Environment  Flowsheets (Taken 2/23/2023 0430)  Safety Promotion/Fall Prevention:   assistive device/personal item within reach   lighting adjusted   medications reviewed   room near unit station   side rails raised x 3   instructed to call staff for mobility

## 2023-02-23 NOTE — PLAN OF CARE
[facility]  Initial Discharge Assessment       Primary Care Provider: Timothy Solomon III, DO    Admission Diagnosis: Nausea [R11.0]  Partial small bowel obstruction [K56.600]    Admission Date: 2/22/2023  Expected Discharge Date:     Discharge Barriers Identified: None    Payor: MEDICARE / Plan: MEDICARE PART A & B / Product Type: Government /     Extended Emergency Contact Information  Primary Emergency Contact: SONIA LUCAS  Mobile Phone: [phone]  Relation: Son  Preferred language: English   needed? No    Discharge Plan A: Home with family  Discharge Plan B: Home with family      [facility] [place], [place] - [address]  [address]  [facility] [address]  Phone: [phone] Fax: [phone]    [facility] [address]  [address] [facility] [address]  Phone: [phone] Fax: [phone]      Initial Assessment (most recent)       Adult Discharge Assessment - 02/23/23 1004          Discharge Assessment    Assessment Type Discharge Planning Assessment     Source of Information patient     Communicated AYDEN with patient/caregiver Date not available/Unable to determine     People in Home spouse     Do you expect to return to your current living situation? Yes     Do you have help at home or someone to help you manage your care at home? Yes     Who are your caregiver(s) and their phone number(s)? susanna Serna [phone]-patient believes that is the correct number     Prior to hospitilization cognitive status: Alert/Oriented     Current cognitive status: Alert/Oriented     Equipment Currently Used at Home none     Patient currently being followed by outpatient case management? No     Do you currently have service(s) that help you manage your care at home? No     Do you take prescription medications? Yes     Do you have any problems affording any of your prescribed medications? No     Is the patient taking medications as  prescribed? yes     Who is going to help you get home at discharge? wife Daphne     How do you get to doctors appointments? car, drives self;family or friend will provide     Are you on dialysis? No     Do you take coumadin? No     Discharge Plan A Home with family     Discharge Plan B Home with family     DME Needed Upon Discharge  none     Discharge Plan discussed with: Patient     Discharge Barriers Identified None        Physical Activity    On average, how many days per week do you engage in moderate to strenuous exercise (like a brisk walk)? 0 days     On average, how many minutes do you engage in exercise at this level? 0 min        Financial Resource Strain    How hard is it for you to pay for the very basics like food, housing, medical care, and heating? Not hard at all        Housing Stability    In the last 12 months, was there a time when you were not able to pay the mortgage or rent on time? No     In the last 12 months, how many places have you lived? 1     In the last 12 months, was there a time when you did not have a steady place to sleep or slept in a shelter (including now)? No        Transportation Needs    In the past 12 months, has lack of transportation kept you from medical appointments or from getting medications? No     In the past 12 months, has lack of transportation kept you from meetings, work, or from getting things needed for daily living? No        Food Insecurity    Within the past 12 months, you worried that your food would run out before you got the money to buy more. Never true     Within the past 12 months, the food you bought just didn't last and you didn't have money to get more. Never true        Stress    Do you feel stress - tense, restless, nervous, or anxious, or unable to sleep at night because your mind is troubled all the time - these days? Not at all        Social Connections    In a typical week, how many times do you talk on the phone with family, friends, or  neighbors? More than three times a week     How often do you get together with friends or relatives? Once a week     How often do you attend Faith or Zoroastrianism services? Never     Do you belong to any clubs or organizations such as Faith groups, unions, fraternal or athletic groups, or school groups? No     How often do you attend meetings of the clubs or organizations you belong to? Never     Are you , , , , never , or living with a partner?         Alcohol Use    Q1: How often do you have a drink containing alcohol? 2-4 times a month     Q2: How many drinks containing alcohol do you have on a typical day when you are drinking? 1 or 2     Q3: How often do you have six or more drinks on one occasion? Never                   Spoke with patient in his room. He lives with his wife. He has no equipment at home. No home health. He is employed at a part time job. Dc plan is home with wife. IM explained and signed. Will follow.

## 2023-02-23 NOTE — PROGRESS NOTES
[facility]  Adult Nutrition  First Assessment Note         Reason for Assessment  Reason For Assessment: identified at risk by screening criteria   Nutrition Risk Screen: no indicators present     Patient seen for MST. RD visited patient today to perform NFPE and obtain diet and intake history. Patient reports poor intake of less than 75% for at least 7 days prior to admission. His weight is 255#. Upon chart review he is a weight loss of 33#/11.4% x 6 months (severe). His weight is down 11#/4.1% x 1 month. He meets ASPEN criteria for Mild Protein calorie Malnutrition. See findings below.     Per MD notes:   This is a 79 y/o white male,who presents to the ED via EMS with complaints of abdominal pain which started 4-5 weeks ago. He states he was seen by Dr. Raman yesterday and was told to drink Miralax and follow up in a month. He notes he has felt nauseated but has not vomited although his wife states the pt did vomit earlier in the day, but notes it was only dark green bile like. He denies a fever but notes constipation. There are no other complaints/pain in the ED at this time. He has a known hx of GERD, HTN, hyperlipidemia, carcinoma of prostate, hypothyroidism, diverticula of the colon, and colon polyps.        Malnutrition  Is Patient Malnourished: Yes Malnutrition Assessment  Malnutrition Type: acute illness or injury          Weight Loss (Malnutrition): 10% in 6 months  Energy Intake (Malnutrition): less than 75% for greater than 7 days  Subcutaneous Fat (Malnutrition): mild depletion  Muscle Mass (Malnutrition): mild depletion   Orbital Region (Subcutaneous Fat Loss): mild depletion  Upper Arm Region (Subcutaneous Fat Loss): mild depletion  Thoracic and Lumbar Region: mild depletion   [place] Region (Muscle Loss): mild depletion  Clavicle Bone Region (Muscle Loss): mild depletion       Subcutaneous Fat Loss (Final Summary): mild protein-calorie malnutrition  Muscle Loss Evaluation  (Final Summary): mild protein-calorie malnutrition    Moderate Weight Loss (Malnutrition): 10% in 6 months  Severe Weight Loss (Malnutrition): greater than 10% in 6 months    Skin Integrity  Esteban Risk Assessment  Sensory Perception: 4-->no impairment  Moisture: 4-->rarely moist  Activity: 4-->walks frequently  Mobility: 3-->slightly limited  Nutrition: 2-->probably inadequate  Friction and Shear: 3-->no apparent problem  Esteban Score: 20  Comments on skin integrity: no issues  Nutrition Diagnosis  Inadequate energy intake   related to Decreased ability to consume sufficient energy as evidenced by partial small bowel obstruction with decreased appetite and intaker    Nutrition Diagnosis Status: Chronic/ continues      Nutrition Risk  Level of Risk/Frequency of Follow-up: high  Comments on nutrition risk: NPO   Recent Labs   Lab 02/22/23  2206   *     Comments on Glucose: dx of diabetes  Nutrition Prescription / Recommendations  Recommendation/Intervention: Recommend advancing diet as medically appropriate. Patient with dx of diabetes. Recommend diabetic diet with addition of Glucerna TID with meals.  Goals: weight maintenance, tolerance of diet advancement as appropriate  Nutrition Goal Status: new  Current Diet Order: NPO  Chewing or Swallowing Difficulty?: No Chewing or swallowing difficulty  Recommended Diet: NPO  Recommended Oral Supplement: No Oral Supplements  Is Nutrition Support Recommended: No  Is Education Recommended: No  Monitor and Evaluation  % current Intake: NPO  % intake to meet estimated needs: 75 - 100 %  Food and Nutrient Intake: energy intake  Food and Nutrient Adminstration: other (specify) (anurag NPO)  Anthropometric Measurements: weight change, weight  Biochemical Data, Medical Tests and Procedures: electrolyte and renal panel, inflammatory profile, gastrointestinal profile, lipid profile, glucose/endocrine profile  Nutrition-Focused Physical Findings: overall appearance,  extremities, muscles and bones, head and eyes, skin     Current Medical Diagnosis and Past Medical History  Diagnosis: diabetes diagnosis/complications  Past Medical History:   Diagnosis Date    Carcinoma of prostate     Diverticula, colon 8/19/2021    Essential hypertension     GERD (gastroesophageal reflux disease)     History of colon polyps 8/19/2021    Hyperlipidemia     Hypothyroidism     Obesity     Polyp of ascending colon 8/19/2021     Nutrition/Diet History  Food Allergies: NKFA  Factors Affecting Nutritional Intake: NPO  Lab/Procedures/Meds  Recent Labs   Lab 02/22/23  2206      K 3.8   BUN 17   CREATININE 0.92   CALCIUM 9.9   ALBUMIN 4.4      ALT 13*   AST 25     Last A1c:   Lab Results   Component Value Date    HGBA1C 5.5 07/20/2021     Lab Results   Component Value Date    RBC 5.44 02/22/2023    HGB 16.3 02/22/2023    HCT 47.5 02/22/2023    MCV 87.3 02/22/2023    MCH 30.0 02/22/2023    MCHC 34.3 02/22/2023     Pertinent Labs Reviewed: reviewed  Pertinent Labs Comments: 02/22/23 22:06  Sodium: 139  Potassium: 3.8  Chloride: 100  CO2: 25  Anion Gap: 18 (H)  BUN: 17  Creatinine: 0.92  BUN/CREAT RATIO: 18  eGFR: 86  Glucose: 114 (H)  Calcium: 9.9  Magnesium: 2.1  Alkaline Phosphatase: 82  PROTEIN TOTAL: 8.9 (H)  Albumin: 4.4      (H): Data is abnormally high  Pertinent Medications Reviewed: reviewed  Pertinent Medications Comments: NaCl  Anthropometrics  Temp: 98.9 °F (37.2 °C)  Height Method: Stated  Height: 6' (182.9 cm)  Height (inches): 72 in  Weight Method: Bed Scale  Weight: 115.7 kg (255 lb)  Weight (lb): 255 lb  Ideal Body Weight (IBW), Male: 178 lb  % Ideal Body Weight, Male (lb): 143.26 %  BMI (Calculated): 34.6  BMI Grade: 30 - 34.9- obesity - grade I     Estimated/Assessed Needs      Temp: 98.9 °F (37.2 °C)Oral  Weight Used For Calorie Calculations: 87.6 kg (193 lb 2 oz)   Energy Need Method: Kcal/kg Energy Calorie Requirements (kcal): 9468-8280  Weight Used For Protein  Calculations: 87.6 kg (193 lb 2 oz)  Protein Requirements:   Estimated Fluid Requirement Method: RDA Method    RDA Method (mL): 2190     Nutrition by Nursing  Diet/Nutrition Received: NPO           Last Bowel Movement: 02/22/23              Nutrition Follow-Up     yes

## 2023-02-23 NOTE — SUBJECTIVE & OBJECTIVE
No current facility-administered medications on file prior to encounter.     Current Outpatient Medications on File Prior to Encounter   Medication Sig    amLODIPine (NORVASC) 5 MG tablet Take 1 tablet (5 mg total) by mouth 2 (two) times daily.    gemfibroziL (LOPID) 600 MG tablet Take 1 tablet (600 mg total) by mouth once daily. With food.    HYDROcodone-acetaminophen (NORCO)  mg per tablet Take 1 tablet by mouth every 6 (six) hours as needed for Pain.    lisinopriL (PRINIVIL,ZESTRIL) 20 MG tablet Take 1 tablet (20 mg total) by mouth once daily.    omeprazole (PRILOSEC) 20 MG capsule Take 1 capsule (20 mg total) by mouth once daily.    pravastatin (PRAVACHOL) 80 MG tablet Take 1 tablet (80 mg total) by mouth once daily.    promethazine (PHENERGAN) 25 MG tablet TAKE ONE TABLET BY MOUTH EVERY SIX HOURS AS NEEDED FOR NAUSEA       Review of patient's allergies indicates:   Allergen Reactions    Opioids - morphine analogues        Past Medical History:   Diagnosis Date    Carcinoma of prostate     Diverticula, colon 8/19/2021    Essential hypertension     GERD (gastroesophageal reflux disease)     History of colon polyps 8/19/2021    Hyperlipidemia     Hypothyroidism     Obesity     Polyp of ascending colon 8/19/2021     Past Surgical History:   Procedure Laterality Date    ABDOMINAL SURGERY N/A     blockage of the bowels    COLONOSCOPY  08/23/2021    repeat 1 year    PROSTATECTOMY      UMBILICAL HERNIA REPAIR      x 3      Family History       Problem Relation (Age of Onset)    Breast cancer Mother          Tobacco Use    Smoking status: Never    Smokeless tobacco: Never   Substance and Sexual Activity    Alcohol use: Yes     Comment: on occasion    Drug use: Never    Sexual activity: Not Currently     Review of Systems   Constitutional:  Positive for appetite change and unexpected weight change.   Respiratory:  Negative for chest tightness and shortness of breath.    Gastrointestinal:  Positive for abdominal  pain and constipation.        Denies flatus   Skin:  Negative for wound.   Objective:     Vital Signs (Most Recent):  Temp: 98.9 °F (37.2 °C) (02/23/23 0600)  Pulse: (!) 112 (02/23/23 0600)  Resp: 17 (02/23/23 0600)  BP: 118/69 (02/23/23 0600)  SpO2: (!) 93 % (02/23/23 0600)   Vital Signs (24h Range):  Temp:  [97.6 °F (36.4 °C)-98.9 °F (37.2 °C)] 98.9 °F (37.2 °C)  Pulse:  [101-123] 112  Resp:  [17-18] 17  SpO2:  [92 %-97 %] 93 %  BP: (118-137)/(69-90) 118/69     Weight: 115.7 kg (255 lb)  Body mass index is 34.58 kg/m².    Physical Exam  Vitals and nursing note reviewed.   HENT:      Head: Normocephalic.      Nose: Nose normal.   Eyes:      Conjunctiva/sclera: Conjunctivae normal.   Cardiovascular:      Rate and Rhythm: Tachycardia present.   Pulmonary:      Effort: Pulmonary effort is normal.   Abdominal:      Palpations: Abdomen is soft.      Tenderness: There is abdominal tenderness.      Comments: RLQ tenderness  Hypoactive bowel sounds    Well healed midline incision   Musculoskeletal:         General: Normal range of motion.   Skin:     General: Skin is warm and dry.   Neurological:      Mental Status: He is alert and oriented to person, place, and time.   Psychiatric:         Mood and Affect: Mood normal.       Significant Labs:  I have reviewed all pertinent lab results within the past 24 hours.  CBC:   Recent Labs   Lab 02/22/23 2206   WBC 13.23*   RBC 5.44   HGB 16.3   HCT 47.5      MCV 87.3   MCH 30.0   MCHC 34.3     BMP:   Recent Labs   Lab 02/22/23 2206   *      K 3.8      CO2 25   BUN 17   CREATININE 0.92   CALCIUM 9.9   MG 2.1     CMP:   Recent Labs   Lab 02/22/23 2206   *   CALCIUM 9.9   ALBUMIN 4.4   PROT 8.9*      K 3.8   CO2 25      BUN 17   CREATININE 0.92   ALKPHOS 82   ALT 13*   AST 25   BILITOT 0.6     LFTs:   Recent Labs   Lab 02/22/23  2206   ALT 13*   AST 25   ALKPHOS 82   BILITOT 0.6   PROT 8.9*   ALBUMIN 4.4       Significant  Diagnostics:  I have reviewed all pertinent imaging results/findings within the past 24 hours.

## 2023-02-23 NOTE — ED PROVIDER NOTES
Encounter Date: 2023    SCRIBE #1 NOTE: I, Hoda Hall, am scribing for, and in the presence of,  Sai Avila MD. I have scribed the entire note.     History     Chief Complaint   Patient presents with    Abdominal Pain    Vomiting     This is a 79 y/o white male,who presents to the ED via EMS with complaints of abdominal pain which started 4-5 weeks ago. He states he was seen by Dr. Raman yesterday and was told to drink Miralax and follow up in a month. He notes he has felt nauseated but has not vomited although his wife states the pt did vomit earlier in the day, but notes it was only dark green bile like. He denies a fever but notes constipation. There are no other complaints/pain in the ED at this time. He has a known hx of GERD, HTN, hyperlipidemia, carcinoma of prostate, hypothyroidism, diverticula of the colon, and colon polyps.       Review of patient's allergies indicates:   Allergen Reactions    Opioids - morphine analogues      Past Medical History:   Diagnosis Date    Carcinoma of prostate     Diverticula, colon 2021    Essential hypertension     GERD (gastroesophageal reflux disease)     History of colon polyps 2021    Hyperlipidemia     Hypothyroidism     Obesity     Polyp of ascending colon 2021     Past Surgical History:   Procedure Laterality Date    ABDOMINAL SURGERY N/A     blockage of the bowels    COLONOSCOPY  2021    repeat 1 year    PROSTATECTOMY      UMBILICAL HERNIA REPAIR      x 3      Family History   Problem Relation Age of Onset    Breast cancer Mother          at age 49 from breast CA with mets     Social History     Tobacco Use    Smoking status: Never    Smokeless tobacco: Never   Substance Use Topics    Alcohol use: Yes     Comment: on occasion    Drug use: Never     Review of Systems   Constitutional:  Negative for fever.   Gastrointestinal:  Positive for abdominal pain, constipation and nausea. Negative for vomiting.   All other systems  reviewed and are negative.    Physical Exam     Initial Vitals [02/22/23 2104]   BP Pulse Resp Temp SpO2   132/79 101 18 97.6 °F (36.4 °C) 97 %      MAP       --         Physical Exam    Nursing note and vitals reviewed.  Constitutional: He appears well-developed and well-nourished.   HENT:   Head: Normocephalic and atraumatic.   Eyes: Conjunctivae and EOM are normal. Pupils are equal, round, and reactive to light.   Neck: Neck supple.   Normal range of motion.  Cardiovascular:  Normal rate, regular rhythm, normal heart sounds and intact distal pulses.           Pulmonary/Chest: Breath sounds normal.   Abdominal: Abdomen is soft. Bowel sounds are normal.   Musculoskeletal:         General: Normal range of motion.      Cervical back: Normal range of motion and neck supple.     Neurological: He is alert and oriented to person, place, and time. He has normal strength.   Skin: Skin is warm and dry. Capillary refill takes less than 2 seconds.   Psychiatric: He has a normal mood and affect. Thought content normal.       ED Course   Procedures  Labs Reviewed   COMPREHENSIVE METABOLIC PANEL - Abnormal; Notable for the following components:       Result Value    Anion Gap 18 (*)     Glucose 114 (*)     Total Protein 8.9 (*)     Globulin 4.5 (*)     ALT 13 (*)     All other components within normal limits   URINALYSIS, REFLEX TO URINE CULTURE - Abnormal; Notable for the following components:    Protein, UA 30 (*)     Ketones, UA 20 (*)     All other components within normal limits   DRUG SCREEN, URINE (BEAKER) - Abnormal; Notable for the following components:    Opiates, Urine Positive (*)     All other components within normal limits    Narrative:     The results of screening tests should be considered presumptive. Confirmatory testing is available upon request.    Cutoff Points:  PCP:         25ng/mL  AMPH:        500ng/mL  EWELINA:        200ng/mL  JUS:        200ng/mL  THC:         50ng/mL  HARRY:         300ng/mL  OPI:          2000ng/mL   CBC WITH DIFFERENTIAL - Abnormal; Notable for the following components:    WBC 13.23 (*)     MPV 8.7 (*)     Neutrophils % 81.7 (*)     Lymphocytes % 11.9 (*)     Eosinophils % 0.1 (*)     Neutrophils, Abs 10.83 (*)     Immature Granulocytes, Absolute 0.05 (*)     All other components within normal limits   URINALYSIS, MICROSCOPIC - Abnormal; Notable for the following components:    Renal Epithelial Cells, UA Few (*)     Mucus, UA Moderate (*)     All other components within normal limits   APTT - Normal   PROTIME-INR - Normal   MAGNESIUM - Normal   AMYLASE - Normal   LIPASE - Normal   LACTIC ACID, PLASMA - Normal   TROPONIN I - Normal   CBC W/ AUTO DIFFERENTIAL    Narrative:     The following orders were created for panel order CBC auto differential.  Procedure                               Abnormality         Status                     ---------                               -----------         ------                     CBC with Differential[[id number]]        Abnormal            Final result                 Please view results for these tests on the individual orders.          Imaging Results              CT Abdomen Pelvis With Contrast (In process)                   X-Rays:   Independently Interpreted Readings:   Other Readings:  CT Abdomen pelvis with contrast:   1. There is no hydronephrosis or perinephric fat stranding seen.   2. Mild to moderate grade small bowel obstruction is seen with transition in the left lower abdomen. No free air seen.   Medications   prochlorperazine injection Soln 10 mg (10 mg Intravenous Given 2/22/23 1125)   iopamidoL (ISOVUE-370) injection 100 mL (100 mLs Intravenous Given 2/22/23 4845)     Medical Decision Making:   Initial Assessment:   ABDOMINAL PAIN AND NAUSEA WORSENING OVER WEEKS.  HISTORY OF SBO.  RECENT EGD WITH DR WOMACK.    Differential Diagnosis:   DDX:  PARTIAL SBO VS GASTROENTERITIS VS GASTRITIS VS INFLAMMATORY BOWEL ISSUE VS OTHER  Clinical Tests:   Lab  Tests: Ordered and Reviewed  The following lab test(s) were unremarkable: CBC and CMP  Radiological Study: Ordered and Reviewed  ED Management:  DX:  PARTIAL SMALL BOWEL OBSTRUCTION.            Attending Attestation:           Physician Attestation for Scribe:  Physician Attestation Statement for Scribe #1: I, Sai Avila MD, reviewed documentation, as scribed by Hoda Hall in my presence, and it is both accurate and complete.           ED Course as of 02/23/23 0152   u Feb 23, 2023   0108 CT abdomen pelvis with contrast:   1. There is no hydronephrosis or perinephric fat stranding seen.   2. Mild to moderate grade small bowel obstruction is ween with transition in the left lower abdomen. No free air seen.  [BW]      ED Course User Index  [BW] Hoda Hall                 Clinical Impression:   Final diagnoses:  [R11.0] Nausea  [K56.600] Partial small bowel obstruction (Primary)        ED Disposition Condition    Admit Stable                Sai Avila MD  02/23/23 0152

## 2023-02-23 NOTE — ASSESSMENT & PLAN NOTE
02/23/2023  continue bowel rest, IV hydration Gastrografin challenge serial abdominal exams reconcile home medications surgical management is needed

## 2023-02-23 NOTE — H&P
[facility]  General Surgery  History & Physical    Patient Name: Diogenes Aguilar  MRN: [id number]  Admission Date: 2/22/2023  Attending Physician: Kaleigh Beach MD   Primary Care Provider: Timothy Solomon III, DO    Patient information was obtained from ER records.     Subjective:     Chief Complaint/Reason for Admission: abdominal pain/n/v/constipation/wt loss    History of Present Illness: Remote history of bowel obstruction requiring laparotomy with lysis of adhesions by Dr. Beach  Subsequent hernia repair laparoscopically with biologic mesh per Dr. Recinos 10 years ago  Patient with constipation abdominal discomfort significant weight loss since January  Colonoscopy 3 months ago per Dr. Huerta with findings of diverticulosis rectal polypectomy 2 days ago EGD with gastritis no ulcers small hiatal hernia  Pain has progressed with nausea vomiting yesterday presented to the emergency department  Findings of mild leukocytosis 13,000  CT scan concerning for dilated loops of small bowel partial small-bowel obstruction  Failed attempt at ngt insertion due to nose bleed  PMH:  HTN        No current facility-administered medications on file prior to encounter.     Current Outpatient Medications on File Prior to Encounter   Medication Sig    amLODIPine (NORVASC) 5 MG tablet Take 1 tablet (5 mg total) by mouth 2 (two) times daily.    gemfibroziL (LOPID) 600 MG tablet Take 1 tablet (600 mg total) by mouth once daily. With food.    HYDROcodone-acetaminophen (NORCO)  mg per tablet Take 1 tablet by mouth every 6 (six) hours as needed for Pain.    lisinopriL (PRINIVIL,ZESTRIL) 20 MG tablet Take 1 tablet (20 mg total) by mouth once daily.    omeprazole (PRILOSEC) 20 MG capsule Take 1 capsule (20 mg total) by mouth once daily.    pravastatin (PRAVACHOL) 80 MG tablet Take 1 tablet (80 mg total) by mouth once daily.    promethazine (PHENERGAN) 25 MG tablet TAKE ONE TABLET BY MOUTH EVERY SIX HOURS AS  NEEDED FOR NAUSEA       Review of patient's allergies indicates:   Allergen Reactions    Opioids - morphine analogues        Past Medical History:   Diagnosis Date    Carcinoma of prostate     Diverticula, colon 8/19/2021    Essential hypertension     GERD (gastroesophageal reflux disease)     History of colon polyps 8/19/2021    Hyperlipidemia     Hypothyroidism     Obesity     Polyp of ascending colon 8/19/2021     Past Surgical History:   Procedure Laterality Date    ABDOMINAL SURGERY N/A     blockage of the bowels    COLONOSCOPY  08/23/2021    repeat 1 year    PROSTATECTOMY      UMBILICAL HERNIA REPAIR      x 3      Family History       Problem Relation (Age of Onset)    Breast cancer Mother          Tobacco Use    Smoking status: Never    Smokeless tobacco: Never   Substance and Sexual Activity    Alcohol use: Yes     Comment: on occasion    Drug use: Never    Sexual activity: Not Currently     Review of Systems   Constitutional:  Positive for appetite change and unexpected weight change.   Respiratory:  Negative for chest tightness and shortness of breath.    Gastrointestinal:  Positive for abdominal pain and constipation.        Denies flatus   Skin:  Negative for wound.   Objective:     Vital Signs (Most Recent):  Temp: 98.9 °F (37.2 °C) (02/23/23 0600)  Pulse: (!) 112 (02/23/23 0600)  Resp: 17 (02/23/23 0600)  BP: 118/69 (02/23/23 0600)  SpO2: (!) 93 % (02/23/23 0600)   Vital Signs (24h Range):  Temp:  [97.6 °F (36.4 °C)-98.9 °F (37.2 °C)] 98.9 °F (37.2 °C)  Pulse:  [101-123] 112  Resp:  [17-18] 17  SpO2:  [92 %-97 %] 93 %  BP: (118-137)/(69-90) 118/69     Weight: 115.7 kg (255 lb)  Body mass index is 34.58 kg/m².    Physical Exam  Vitals and nursing note reviewed.   HENT:      Head: Normocephalic.      Nose: Nose normal.   Eyes:      Conjunctiva/sclera: Conjunctivae normal.   Cardiovascular:      Rate and Rhythm: Tachycardia present.   Pulmonary:      Effort: Pulmonary effort is normal.    Abdominal:      Palpations: Abdomen is soft.      Tenderness: There is abdominal tenderness.      Comments: RLQ tenderness  Hypoactive bowel sounds    Well healed midline incision   Musculoskeletal:         General: Normal range of motion.   Skin:     General: Skin is warm and dry.   Neurological:      Mental Status: He is alert and oriented to person, place, and time.   Psychiatric:         Mood and Affect: Mood normal.       Significant Labs:  I have reviewed all pertinent lab results within the past 24 hours.  CBC:   Recent Labs   Lab 02/22/23 2206   WBC 13.23*   RBC 5.44   HGB 16.3   HCT 47.5      MCV 87.3   MCH 30.0   MCHC 34.3     BMP:   Recent Labs   Lab 02/22/23 2206   *      K 3.8      CO2 25   BUN 17   CREATININE 0.92   CALCIUM 9.9   MG 2.1     CMP:   Recent Labs   Lab 02/22/23 2206   *   CALCIUM 9.9   ALBUMIN 4.4   PROT 8.9*      K 3.8   CO2 25      BUN 17   CREATININE 0.92   ALKPHOS 82   ALT 13*   AST 25   BILITOT 0.6     LFTs:   Recent Labs   Lab 02/22/23 2206   ALT 13*   AST 25   ALKPHOS 82   BILITOT 0.6   PROT 8.9*   ALBUMIN 4.4       Significant Diagnostics:  I have reviewed all pertinent imaging results/findings within the past 24 hours.      Assessment/Plan:     * Partial small bowel obstruction  02/23/2023  continue bowel rest, IV hydration Gastrografin challenge serial abdominal exams reconcile home medications surgical management is needed      VTE Risk Mitigation (From admission, onward)         Ordered     enoxaparin injection 40 mg  Daily         02/23/23 1008     IP VTE HIGH RISK PATIENT  Once         02/23/23 0319     Place sequential compression device  Until discontinued         02/23/23 0319     Place TUCKER hose  Until discontinued         02/23/23 0319                Celestina Simpson, DREW  General Surgery  [facility]

## 2023-02-24 ENCOUNTER — TELEPHONE (OUTPATIENT)
Dept: GASTROENTEROLOGY | Facility: HOSPITAL | Age: 78
End: 2023-02-24
Payer: MEDICARE

## 2023-02-24 ENCOUNTER — ANESTHESIA (OUTPATIENT)
Dept: SURGERY | Facility: HOSPITAL | Age: 78
DRG: 337 | End: 2023-02-24
Payer: MEDICARE

## 2023-02-24 ENCOUNTER — ANESTHESIA EVENT (OUTPATIENT)
Dept: SURGERY | Facility: HOSPITAL | Age: 78
DRG: 337 | End: 2023-02-24
Payer: MEDICARE

## 2023-02-24 VITALS
OXYGEN SATURATION: 99 % | HEART RATE: 93 BPM | RESPIRATION RATE: 10 BRPM | DIASTOLIC BLOOD PRESSURE: 104 MMHG | SYSTOLIC BLOOD PRESSURE: 140 MMHG

## 2023-02-24 LAB
ANION GAP SERPL CALCULATED.3IONS-SCNC: 13 MMOL/L (ref 7–16)
BASOPHILS # BLD AUTO: 0.03 K/UL (ref 0–0.2)
BASOPHILS NFR BLD AUTO: 0.2 % (ref 0–1)
BUN SERPL-MCNC: 22 MG/DL (ref 7–18)
BUN/CREAT SERPL: 28 (ref 6–20)
CALCIUM SERPL-MCNC: 8.5 MG/DL (ref 8.5–10.1)
CHLORIDE SERPL-SCNC: 105 MMOL/L (ref 98–107)
CO2 SERPL-SCNC: 26 MMOL/L (ref 21–32)
CREAT SERPL-MCNC: 0.78 MG/DL (ref 0.7–1.3)
DIFFERENTIAL METHOD BLD: ABNORMAL
EGFR (NO RACE VARIABLE) (RUSH/TITUS): 92 ML/MIN/1.73M²
EOSINOPHIL # BLD AUTO: 0.01 K/UL (ref 0–0.5)
EOSINOPHIL NFR BLD AUTO: 0.1 % (ref 1–4)
ERYTHROCYTE [DISTWIDTH] IN BLOOD BY AUTOMATED COUNT: 13.9 % (ref 11.5–14.5)
GLUCOSE SERPL-MCNC: 103 MG/DL (ref 74–106)
HCT VFR BLD AUTO: 42.1 % (ref 40–54)
HGB BLD-MCNC: 14 G/DL (ref 13.5–18)
IMM GRANULOCYTES # BLD AUTO: 0.05 K/UL (ref 0–0.04)
IMM GRANULOCYTES NFR BLD: 0.4 % (ref 0–0.4)
INDIRECT COOMBS: NORMAL
LYMPHOCYTES # BLD AUTO: 1.47 K/UL (ref 1–4.8)
LYMPHOCYTES NFR BLD AUTO: 12.1 % (ref 27–41)
MCH RBC QN AUTO: 30.2 PG (ref 27–31)
MCHC RBC AUTO-ENTMCNC: 33.3 G/DL (ref 32–36)
MCV RBC AUTO: 90.7 FL (ref 80–96)
MONOCYTES # BLD AUTO: 0.8 K/UL (ref 0–0.8)
MONOCYTES NFR BLD AUTO: 6.6 % (ref 2–6)
MPC BLD CALC-MCNC: 8.4 FL (ref 9.4–12.4)
NEUTROPHILS # BLD AUTO: 9.8 K/UL (ref 1.8–7.7)
NEUTROPHILS NFR BLD AUTO: 80.6 % (ref 53–65)
NRBC # BLD AUTO: 0 X10E3/UL
NRBC, AUTO (.00): 0 %
PLATELET # BLD AUTO: 257 K/UL (ref 150–400)
POTASSIUM SERPL-SCNC: 3.3 MMOL/L (ref 3.5–5.1)
RBC # BLD AUTO: 4.64 M/UL (ref 4.6–6.2)
RH BLD: NORMAL
SODIUM SERPL-SCNC: 141 MMOL/L (ref 136–145)
WBC # BLD AUTO: 12.16 K/UL (ref 4.5–11)

## 2023-02-24 PROCEDURE — 63600175 PHARM REV CODE 636 W HCPCS: Performed by: NURSE PRACTITIONER

## 2023-02-24 PROCEDURE — 51798 US URINE CAPACITY MEASURE: CPT

## 2023-02-24 PROCEDURE — 11000001 HC ACUTE MED/SURG PRIVATE ROOM

## 2023-02-24 PROCEDURE — D9220A PRA ANESTHESIA: Mod: CRNA,,, | Performed by: NURSE ANESTHETIST, CERTIFIED REGISTERED

## 2023-02-24 PROCEDURE — 88300 SURGICAL PATH GROSS: CPT | Mod: 26,,, | Performed by: PATHOLOGY

## 2023-02-24 PROCEDURE — 27000655: Performed by: NURSE ANESTHETIST, CERTIFIED REGISTERED

## 2023-02-24 PROCEDURE — 25000003 PHARM REV CODE 250: Performed by: EMERGENCY MEDICINE

## 2023-02-24 PROCEDURE — D9220A PRA ANESTHESIA: ICD-10-PCS | Mod: CRNA,,, | Performed by: NURSE ANESTHETIST, CERTIFIED REGISTERED

## 2023-02-24 PROCEDURE — 99024 PR POST-OP FOLLOW-UP VISIT: ICD-10-PCS | Mod: ,,, | Performed by: NURSE PRACTITIONER

## 2023-02-24 PROCEDURE — 99900035 HC TECH TIME PER 15 MIN (STAT)

## 2023-02-24 PROCEDURE — 36000706: Performed by: SURGERY

## 2023-02-24 PROCEDURE — 27000689 HC BLADE LARYNGOSCOPE ANY SIZE: Performed by: NURSE ANESTHETIST, CERTIFIED REGISTERED

## 2023-02-24 PROCEDURE — 27000716 HC OXISENSOR PROBE, ANY SIZE: Performed by: NURSE ANESTHETIST, CERTIFIED REGISTERED

## 2023-02-24 PROCEDURE — D9220A PRA ANESTHESIA: ICD-10-PCS | Mod: ANES,,, | Performed by: ANESTHESIOLOGY

## 2023-02-24 PROCEDURE — 27000510 HC BLANKET BAIR HUGGER ANY SIZE: Performed by: NURSE ANESTHETIST, CERTIFIED REGISTERED

## 2023-02-24 PROCEDURE — 25000003 PHARM REV CODE 250: Performed by: SURGERY

## 2023-02-24 PROCEDURE — 88304 TISSUE EXAM BY PATHOLOGIST: CPT | Mod: 26,,, | Performed by: PATHOLOGY

## 2023-02-24 PROCEDURE — 63600175 PHARM REV CODE 636 W HCPCS: Performed by: ANESTHESIOLOGY

## 2023-02-24 PROCEDURE — 88300 SURGICAL PATHOLOGY: ICD-10-PCS | Mod: 26,,, | Performed by: PATHOLOGY

## 2023-02-24 PROCEDURE — 27000165 HC TUBE, ETT CUFFED: Performed by: NURSE ANESTHETIST, CERTIFIED REGISTERED

## 2023-02-24 PROCEDURE — 20680 PR REMOVAL DEEP IMPLANT: ICD-10-PCS | Mod: 51,,, | Performed by: SURGERY

## 2023-02-24 PROCEDURE — 36000707: Performed by: SURGERY

## 2023-02-24 PROCEDURE — 25000003 PHARM REV CODE 250: Performed by: NURSE ANESTHETIST, CERTIFIED REGISTERED

## 2023-02-24 PROCEDURE — 44005 PR FREEING BOWEL ADHESION,ENTEROLYSIS: ICD-10-PCS | Mod: ,,, | Performed by: SURGERY

## 2023-02-24 PROCEDURE — 88304 SURGICAL PATHOLOGY: ICD-10-PCS | Mod: 26,,, | Performed by: PATHOLOGY

## 2023-02-24 PROCEDURE — 71000033 HC RECOVERY, INTIAL HOUR: Performed by: SURGERY

## 2023-02-24 PROCEDURE — D9220A PRA ANESTHESIA: Mod: ANES,,, | Performed by: ANESTHESIOLOGY

## 2023-02-24 PROCEDURE — 85025 COMPLETE CBC W/AUTO DIFF WBC: CPT | Performed by: SURGERY

## 2023-02-24 PROCEDURE — 25000003 PHARM REV CODE 250: Performed by: NURSE PRACTITIONER

## 2023-02-24 PROCEDURE — 20680 REMOVAL OF IMPLANT DEEP: CPT | Mod: 51,,, | Performed by: SURGERY

## 2023-02-24 PROCEDURE — 88304 TISSUE EXAM BY PATHOLOGIST: CPT | Mod: TC,SUR | Performed by: SURGERY

## 2023-02-24 PROCEDURE — 44005 FREEING OF BOWEL ADHESION: CPT | Mod: ,,, | Performed by: SURGERY

## 2023-02-24 PROCEDURE — 37000009 HC ANESTHESIA EA ADD 15 MINS: Performed by: SURGERY

## 2023-02-24 PROCEDURE — 94761 N-INVAS EAR/PLS OXIMETRY MLT: CPT

## 2023-02-24 PROCEDURE — 80048 BASIC METABOLIC PNL TOTAL CA: CPT | Performed by: SURGERY

## 2023-02-24 PROCEDURE — 86900 BLOOD TYPING SEROLOGIC ABO: CPT

## 2023-02-24 PROCEDURE — 37000008 HC ANESTHESIA 1ST 15 MINUTES: Performed by: SURGERY

## 2023-02-24 PROCEDURE — 27201423 OPTIME MED/SURG SUP & DEVICES STERILE SUPPLY: Performed by: SURGERY

## 2023-02-24 PROCEDURE — 27000221 HC OXYGEN, UP TO 24 HOURS

## 2023-02-24 PROCEDURE — 63600175 PHARM REV CODE 636 W HCPCS: Performed by: NURSE ANESTHETIST, CERTIFIED REGISTERED

## 2023-02-24 PROCEDURE — 88300 SURGICAL PATH GROSS: CPT | Mod: TC,SUR | Performed by: SURGERY

## 2023-02-24 PROCEDURE — 99024 POSTOP FOLLOW-UP VISIT: CPT | Mod: ,,, | Performed by: NURSE PRACTITIONER

## 2023-02-24 RX ORDER — ACETAMINOPHEN 10 MG/ML
1000 INJECTION, SOLUTION INTRAVENOUS EVERY 8 HOURS
Status: COMPLETED | OUTPATIENT
Start: 2023-02-24 | End: 2023-02-25

## 2023-02-24 RX ORDER — LIDOCAINE HYDROCHLORIDE 20 MG/ML
INJECTION, SOLUTION EPIDURAL; INFILTRATION; INTRACAUDAL; PERINEURAL
Status: DISCONTINUED | OUTPATIENT
Start: 2023-02-24 | End: 2023-02-24

## 2023-02-24 RX ORDER — METOCLOPRAMIDE HYDROCHLORIDE 5 MG/ML
5 INJECTION INTRAMUSCULAR; INTRAVENOUS EVERY 6 HOURS PRN
Status: DISCONTINUED | OUTPATIENT
Start: 2023-02-24 | End: 2023-03-01 | Stop reason: HOSPADM

## 2023-02-24 RX ORDER — PROPOFOL 10 MG/ML
VIAL (ML) INTRAVENOUS
Status: DISCONTINUED | OUTPATIENT
Start: 2023-02-24 | End: 2023-02-24

## 2023-02-24 RX ORDER — VECURONIUM BROMIDE FOR INJECTION 1 MG/ML
INJECTION, POWDER, LYOPHILIZED, FOR SOLUTION INTRAVENOUS
Status: DISCONTINUED | OUTPATIENT
Start: 2023-02-24 | End: 2023-02-24

## 2023-02-24 RX ORDER — MEPERIDINE HYDROCHLORIDE 25 MG/ML
25 INJECTION INTRAMUSCULAR; INTRAVENOUS; SUBCUTANEOUS EVERY 10 MIN PRN
Status: DISCONTINUED | OUTPATIENT
Start: 2023-02-24 | End: 2023-02-24 | Stop reason: HOSPADM

## 2023-02-24 RX ORDER — MORPHINE SULFATE 1 MG/ML
INJECTION INTRAVENOUS CONTINUOUS
Status: DISPENSED | OUTPATIENT
Start: 2023-02-24 | End: 2023-02-26

## 2023-02-24 RX ORDER — ACETAMINOPHEN 10 MG/ML
1000 INJECTION, SOLUTION INTRAVENOUS ONCE
Status: ACTIVE | OUTPATIENT
Start: 2023-02-24 | End: 2023-02-25

## 2023-02-24 RX ORDER — HYDROMORPHONE HYDROCHLORIDE 2 MG/ML
0.5 INJECTION, SOLUTION INTRAMUSCULAR; INTRAVENOUS; SUBCUTANEOUS EVERY 5 MIN PRN
Status: DISCONTINUED | OUTPATIENT
Start: 2023-02-24 | End: 2023-02-24 | Stop reason: HOSPADM

## 2023-02-24 RX ORDER — NALOXONE HCL 0.4 MG/ML
0.2 VIAL (ML) INJECTION
Status: DISCONTINUED | OUTPATIENT
Start: 2023-02-24 | End: 2023-03-01 | Stop reason: HOSPADM

## 2023-02-24 RX ORDER — DEXAMETHASONE SODIUM PHOSPHATE 4 MG/ML
INJECTION, SOLUTION INTRA-ARTICULAR; INTRALESIONAL; INTRAMUSCULAR; INTRAVENOUS; SOFT TISSUE
Status: DISCONTINUED | OUTPATIENT
Start: 2023-02-24 | End: 2023-02-24

## 2023-02-24 RX ORDER — DIPHENHYDRAMINE HYDROCHLORIDE 50 MG/ML
12.5 INJECTION INTRAMUSCULAR; INTRAVENOUS EVERY 4 HOURS PRN
Status: DISCONTINUED | OUTPATIENT
Start: 2023-02-24 | End: 2023-03-01 | Stop reason: HOSPADM

## 2023-02-24 RX ORDER — ONDANSETRON 4 MG/1
8 TABLET, ORALLY DISINTEGRATING ORAL EVERY 8 HOURS PRN
Status: DISCONTINUED | OUTPATIENT
Start: 2023-02-24 | End: 2023-03-01 | Stop reason: HOSPADM

## 2023-02-24 RX ORDER — ONDANSETRON 2 MG/ML
4 INJECTION INTRAMUSCULAR; INTRAVENOUS DAILY PRN
Status: DISCONTINUED | OUTPATIENT
Start: 2023-02-24 | End: 2023-02-24 | Stop reason: HOSPADM

## 2023-02-24 RX ORDER — FENTANYL CITRATE 50 UG/ML
INJECTION, SOLUTION INTRAMUSCULAR; INTRAVENOUS
Status: DISCONTINUED | OUTPATIENT
Start: 2023-02-24 | End: 2023-02-24

## 2023-02-24 RX ORDER — MUPIROCIN 20 MG/G
OINTMENT TOPICAL 2 TIMES DAILY
Status: DISCONTINUED | OUTPATIENT
Start: 2023-02-24 | End: 2023-03-01 | Stop reason: HOSPADM

## 2023-02-24 RX ORDER — ROCURONIUM BROMIDE 10 MG/ML
INJECTION, SOLUTION INTRAVENOUS
Status: DISCONTINUED | OUTPATIENT
Start: 2023-02-24 | End: 2023-02-24

## 2023-02-24 RX ORDER — MIDAZOLAM HYDROCHLORIDE 1 MG/ML
INJECTION INTRAMUSCULAR; INTRAVENOUS
Status: DISCONTINUED | OUTPATIENT
Start: 2023-02-24 | End: 2023-02-24

## 2023-02-24 RX ORDER — DEXTROSE MONOHYDRATE, SODIUM CHLORIDE, AND POTASSIUM CHLORIDE 50; 1.49; 4.5 G/1000ML; G/1000ML; G/1000ML
INJECTION, SOLUTION INTRAVENOUS CONTINUOUS
Status: DISCONTINUED | OUTPATIENT
Start: 2023-02-24 | End: 2023-02-27

## 2023-02-24 RX ORDER — CEFAZOLIN SODIUM 1 G/3ML
INJECTION, POWDER, FOR SOLUTION INTRAMUSCULAR; INTRAVENOUS
Status: DISCONTINUED | OUTPATIENT
Start: 2023-02-24 | End: 2023-02-24

## 2023-02-24 RX ORDER — DIPHENHYDRAMINE HYDROCHLORIDE 50 MG/ML
25 INJECTION INTRAMUSCULAR; INTRAVENOUS EVERY 6 HOURS PRN
Status: DISCONTINUED | OUTPATIENT
Start: 2023-02-24 | End: 2023-02-24 | Stop reason: HOSPADM

## 2023-02-24 RX ORDER — ONDANSETRON 2 MG/ML
INJECTION INTRAMUSCULAR; INTRAVENOUS
Status: DISCONTINUED | OUTPATIENT
Start: 2023-02-24 | End: 2023-02-24

## 2023-02-24 RX ADMIN — VECURONIUM BROMIDE 1 MG: 1 INJECTION, POWDER, LYOPHILIZED, FOR SOLUTION INTRAVENOUS at 03:02

## 2023-02-24 RX ADMIN — AMLODIPINE BESYLATE 5 MG: 5 TABLET ORAL at 08:02

## 2023-02-24 RX ADMIN — ACETAMINOPHEN 1000 MG: 10 INJECTION, SOLUTION INTRAVENOUS at 09:02

## 2023-02-24 RX ADMIN — MORPHINE SULFATE: 1 INJECTION INTRAVENOUS at 09:02

## 2023-02-24 RX ADMIN — POTASSIUM CHLORIDE, DEXTROSE MONOHYDRATE AND SODIUM CHLORIDE: 150; 5; 450 INJECTION, SOLUTION INTRAVENOUS at 06:02

## 2023-02-24 RX ADMIN — LISINOPRIL 20 MG: 20 TABLET ORAL at 08:02

## 2023-02-24 RX ADMIN — ONDANSETRON 8 MG: 2 INJECTION INTRAMUSCULAR; INTRAVENOUS at 02:02

## 2023-02-24 RX ADMIN — MIDAZOLAM HYDROCHLORIDE 2 MG: 1 INJECTION, SOLUTION INTRAMUSCULAR; INTRAVENOUS at 02:02

## 2023-02-24 RX ADMIN — VECURONIUM BROMIDE 3 MG: 1 INJECTION, POWDER, LYOPHILIZED, FOR SOLUTION INTRAVENOUS at 03:02

## 2023-02-24 RX ADMIN — SODIUM CHLORIDE: 9 INJECTION, SOLUTION INTRAVENOUS at 02:02

## 2023-02-24 RX ADMIN — LIDOCAINE HYDROCHLORIDE 80 MG: 20 INJECTION, SOLUTION INTRAVENOUS at 02:02

## 2023-02-24 RX ADMIN — PROPOFOL 160 MG: 10 INJECTION, EMULSION INTRAVENOUS at 02:02

## 2023-02-24 RX ADMIN — MORPHINE SULFATE: 1 INJECTION INTRAVENOUS at 05:02

## 2023-02-24 RX ADMIN — CEFAZOLIN 2 G: 1 INJECTION, POWDER, FOR SOLUTION INTRAMUSCULAR; INTRAVENOUS; PARENTERAL at 02:02

## 2023-02-24 RX ADMIN — SODIUM CHLORIDE: 9 INJECTION, SOLUTION INTRAVENOUS at 03:02

## 2023-02-24 RX ADMIN — HYDROMORPHONE HYDROCHLORIDE 0.5 MG: 2 INJECTION, SOLUTION INTRAMUSCULAR; INTRAVENOUS; SUBCUTANEOUS at 04:02

## 2023-02-24 RX ADMIN — ROCURONIUM BROMIDE 50 MG: 10 INJECTION, SOLUTION INTRAVENOUS at 02:02

## 2023-02-24 RX ADMIN — SODIUM CHLORIDE: 9 INJECTION, SOLUTION INTRAVENOUS at 05:02

## 2023-02-24 RX ADMIN — DEXAMETHASONE SODIUM PHOSPHATE 8 MG: 4 INJECTION, SOLUTION INTRA-ARTICULAR; INTRALESIONAL; INTRAMUSCULAR; INTRAVENOUS; SOFT TISSUE at 02:02

## 2023-02-24 RX ADMIN — SODIUM CHLORIDE: 9 INJECTION, SOLUTION INTRAVENOUS at 04:02

## 2023-02-24 RX ADMIN — FENTANYL CITRATE 50 MCG: 50 INJECTION INTRAMUSCULAR; INTRAVENOUS at 03:02

## 2023-02-24 RX ADMIN — FENTANYL CITRATE 50 MCG: 50 INJECTION INTRAMUSCULAR; INTRAVENOUS at 02:02

## 2023-02-24 RX ADMIN — SUGAMMADEX 200 MG: 100 INJECTION, SOLUTION INTRAVENOUS at 04:02

## 2023-02-24 RX ADMIN — MUPIROCIN: 20 OINTMENT TOPICAL at 08:02

## 2023-02-24 NOTE — ANESTHESIA PREPROCEDURE EVALUATION
02/24/2023  Diogenes Aguilar is a 78 y.o., male.      Pre-op Assessment    I have reviewed the Patient Summary Reports.    I have reviewed the NPO Status.   I have reviewed the Medications.     Review of Systems         Anesthesia Plan  Type of Anesthesia, risks & benefits discussed:    Anesthesia Type: Gen ETT  Intra-op Monitoring Plan: Standard ASA Monitors  Post Op Pain Control Plan: IV/PO Opioids PRN  Induction:  IV  Informed Consent: Informed consent signed with the Patient and all parties understand the risks and agree with anesthesia plan.  All questions answered.   ASA Score: 3    Ready For Surgery From Anesthesia Perspective.     .  Allergic to morphine    Hct 48  2/22/23 EKG: Sinus rhythm  with borderline 1st degree A-V block; 97 bpm  RBBB with left anterior fascicular block   Inferior infarct - age undetermined   Lateral ST-T abnormality  may be due to myocardial ischemia    Medical History   Essential hypertension Hypothyroidism   Carcinoma of prostate GERD (gastroesophageal reflux disease)   Hyperlipidemia Obesity   History of colon polyps Diverticula, colon   Polyp of ascending colon    Partial SBO    Airway exam deferred (COVID precautions)

## 2023-02-24 NOTE — ANESTHESIA PROCEDURE NOTES
Intubation    Date/Time: 2/24/2023 2:36 PM  Performed by: James Cano CRNA  Authorized by: David Jefferson MD     Intubation:     Induction:  Intravenous    Intubated:  Postinduction    Mask Ventilation:  Easy mask    Attempts:  1    Attempted By:  CRNA    Method of Intubation:  Direct and bougie    Blade:  Zhen 4    Laryngeal View Grade: Grade I - full view of cords      Difficult Airway Encountered?: No      Complications:  None    Airway Device:  Oral endotracheal tube    Airway Device Size:  7.5    Style/Cuff Inflation:  Cuffed (inflated to minimal occlusive pressure)    Inflation Amount (mL):  7    Tube secured:  24    Secured at:  The teeth    Placement Verified By:  Capnometry    Complicating Factors:  Obesity, large prominent central incisors and poor neck/head extension (Full Beard)    Findings Post-Intubation:  BS equal bilateral and atraumatic/condition of teeth unchanged  Notes:      Smooth, tolerated well

## 2023-02-24 NOTE — INTERVAL H&P NOTE
The patient has been examined and the H&P has been reviewed:    I concur with the findings and no changes have occurred since H&P was written.    Anesthesia/Surgery risks, benefits and alternative options discussed and understood by patient/family.      Pros and cons of exp lap addressed    Active Hospital Problems    Diagnosis  POA    *Partial small bowel obstruction [K56.600]  Yes    Nausea [R11.0]  Unknown      Resolved Hospital Problems   No resolved problems to display.

## 2023-02-24 NOTE — OP NOTE
[facility]  Surgery Department  Operative Note    SUMMARY     Date of Procedure: 2/24/2023     Procedure: Procedure(s) (LRB):  LAPAROTOMY, EXPLORATORY (N/A)     Surgeon(s) and Role:     * Kaleigh Beach MD - Primary    Assisting Surgeon: None    Pre-Operative Diagnosis: Small bowel obstruction [K56.609]    Post-Operative Diagnosis: Post-Op Diagnosis Codes:     * Small bowel obstruction [K56.609]    Anesthesia: General    Operative Findings (including complications, if any):  Extensive small bowel adhesions to the old midline mesh extensive adhesions intra-abdominal interloop    Description of Technical Procedures:  Taken operative suite vertical midline abdominal incision went through intraperitoneal mesh took down extensive adhesions to the the small bowel to the anterior abdominal wall in the old mesh the multiple circular staple sit had extensive dense adhesions to them several small hernias the peripheral of the mesh implant.  The hernia was again very disfiguring in the mesh staples and steal staples were exposed and traumatic to the bowel we elected to explant the mesh this was done by taking it down completely circumferentially with cautery we then performed lysis of adhesions extensively from ligament Treitz to the ileocecal valve we copiously irrigated the abdomen we aspirated all fluid we palpated the abdomen removed all laps we closed the abdomen with running monofilament looped PDS suture and the skin was closed with staples yusef dressing was applied extensive adhesions time consuming difficult operation qualifies for 22 modifier    Significant Surgical Tasks Conducted by the Assistant(s), if Applicable:  Skin closure    Estimated Blood Loss (EBL): 100 mL           Implants: * No implants in log *    Specimens:   Specimen (24h ago, onward)       Start     Ordered    02/24/23 1600  Surgical Pathology  RELEASE UPON ORDERING         02/24/23 1600                             Condition: Good    Disposition: PACU - hemodynamically stable.    Attestation: I was present and scrubbed for the entire procedure.

## 2023-02-24 NOTE — TRANSFER OF CARE
Anesthesia Transfer of Care Note    Patient: Diogenes Aguilar    Procedure(s) Performed: Procedure(s) (LRB):  LAPAROTOMY, EXPLORATORY (N/A)    Patient location: PACU    Anesthesia Type: general    Transport from OR: Transported from OR on 6-10 L/min O2 by face mask with adequate spontaneous ventilation    Post pain: adequate analgesia    Post assessment: no apparent anesthetic complications    Post vital signs: stable    Level of consciousness: awake, alert and oriented    Nausea/Vomiting: no nausea/vomiting    Complications: none    Transfer of care protocol was followedComments: Good SV continue, NAD noted, VSS, RTRN      Last vitals:   Visit Vitals  BP (!) 136/93 (BP Location: Right arm, Patient Position: Lying)   Pulse 85   Temp 36.2 °C (97.2 °F) (Oral)   Resp 16   Ht 6' (1.829 m)   Wt 115.7 kg (255 lb)   SpO2 96%   BMI 34.58 kg/m²

## 2023-02-24 NOTE — PROGRESS NOTES
GEN SURGERY        Decreased uop, dry  requried ivf bolus last night    N/V    No bowel function    SBO DR martinez rec ex lap to relieve sbo    Discussed pros/cons    Consent    Educated on post op

## 2023-02-25 LAB
ANION GAP SERPL CALCULATED.3IONS-SCNC: 12 MMOL/L (ref 7–16)
BASOPHILS # BLD AUTO: 0.01 K/UL (ref 0–0.2)
BASOPHILS NFR BLD AUTO: 0.1 % (ref 0–1)
BUN SERPL-MCNC: 16 MG/DL (ref 7–18)
BUN/CREAT SERPL: 20 (ref 6–20)
CALCIUM SERPL-MCNC: 8 MG/DL (ref 8.5–10.1)
CHLORIDE SERPL-SCNC: 106 MMOL/L (ref 98–107)
CO2 SERPL-SCNC: 26 MMOL/L (ref 21–32)
CREAT SERPL-MCNC: 0.79 MG/DL (ref 0.7–1.3)
DIFFERENTIAL METHOD BLD: ABNORMAL
EGFR (NO RACE VARIABLE) (RUSH/TITUS): 92 ML/MIN/1.73M²
EOSINOPHIL # BLD AUTO: 0 K/UL (ref 0–0.5)
EOSINOPHIL NFR BLD AUTO: 0 % (ref 1–4)
ERYTHROCYTE [DISTWIDTH] IN BLOOD BY AUTOMATED COUNT: 13.8 % (ref 11.5–14.5)
GLUCOSE SERPL-MCNC: 152 MG/DL (ref 74–106)
HCT VFR BLD AUTO: 38.7 % (ref 40–54)
HGB BLD-MCNC: 12.5 G/DL (ref 13.5–18)
IMM GRANULOCYTES # BLD AUTO: 0.09 K/UL (ref 0–0.04)
IMM GRANULOCYTES NFR BLD: 0.7 % (ref 0–0.4)
LYMPHOCYTES # BLD AUTO: 0.94 K/UL (ref 1–4.8)
LYMPHOCYTES NFR BLD AUTO: 7.1 % (ref 27–41)
MCH RBC QN AUTO: 29.6 PG (ref 27–31)
MCHC RBC AUTO-ENTMCNC: 32.3 G/DL (ref 32–36)
MCV RBC AUTO: 91.5 FL (ref 80–96)
MONOCYTES # BLD AUTO: 0.94 K/UL (ref 0–0.8)
MONOCYTES NFR BLD AUTO: 7.1 % (ref 2–6)
MPC BLD CALC-MCNC: 8.6 FL (ref 9.4–12.4)
NEUTROPHILS # BLD AUTO: 11.25 K/UL (ref 1.8–7.7)
NEUTROPHILS NFR BLD AUTO: 85 % (ref 53–65)
NRBC # BLD AUTO: 0 X10E3/UL
NRBC, AUTO (.00): 0 %
PLATELET # BLD AUTO: 239 K/UL (ref 150–400)
POTASSIUM SERPL-SCNC: 3.7 MMOL/L (ref 3.5–5.1)
RBC # BLD AUTO: 4.23 M/UL (ref 4.6–6.2)
SODIUM SERPL-SCNC: 140 MMOL/L (ref 136–145)
WBC # BLD AUTO: 13.23 K/UL (ref 4.5–11)

## 2023-02-25 PROCEDURE — 94761 N-INVAS EAR/PLS OXIMETRY MLT: CPT

## 2023-02-25 PROCEDURE — 85025 COMPLETE CBC W/AUTO DIFF WBC: CPT | Performed by: NURSE PRACTITIONER

## 2023-02-25 PROCEDURE — 80048 BASIC METABOLIC PNL TOTAL CA: CPT | Performed by: NURSE PRACTITIONER

## 2023-02-25 PROCEDURE — 25000003 PHARM REV CODE 250: Performed by: NURSE PRACTITIONER

## 2023-02-25 PROCEDURE — 97161 PT EVAL LOW COMPLEX 20 MIN: CPT

## 2023-02-25 PROCEDURE — 25000003 PHARM REV CODE 250: Performed by: SURGERY

## 2023-02-25 PROCEDURE — 63600175 PHARM REV CODE 636 W HCPCS: Performed by: NURSE PRACTITIONER

## 2023-02-25 PROCEDURE — 11000001 HC ACUTE MED/SURG PRIVATE ROOM

## 2023-02-25 PROCEDURE — 27000221 HC OXYGEN, UP TO 24 HOURS

## 2023-02-25 RX ADMIN — AMLODIPINE BESYLATE 5 MG: 5 TABLET ORAL at 09:02

## 2023-02-25 RX ADMIN — ACETAMINOPHEN 1000 MG: 10 INJECTION, SOLUTION INTRAVENOUS at 05:02

## 2023-02-25 RX ADMIN — MUPIROCIN: 20 OINTMENT TOPICAL at 08:02

## 2023-02-25 RX ADMIN — MUPIROCIN: 20 OINTMENT TOPICAL at 09:02

## 2023-02-25 RX ADMIN — LISINOPRIL 20 MG: 20 TABLET ORAL at 09:02

## 2023-02-25 RX ADMIN — AMLODIPINE BESYLATE 5 MG: 5 TABLET ORAL at 08:02

## 2023-02-25 RX ADMIN — POTASSIUM CHLORIDE, DEXTROSE MONOHYDRATE AND SODIUM CHLORIDE: 150; 5; 450 INJECTION, SOLUTION INTRAVENOUS at 02:02

## 2023-02-25 RX ADMIN — CEFAZOLIN 2 G: 2 INJECTION, POWDER, FOR SOLUTION INTRAMUSCULAR; INTRAVENOUS at 09:02

## 2023-02-25 RX ADMIN — ACETAMINOPHEN 1000 MG: 10 INJECTION, SOLUTION INTRAVENOUS at 02:02

## 2023-02-25 RX ADMIN — POTASSIUM CHLORIDE, DEXTROSE MONOHYDRATE AND SODIUM CHLORIDE: 150; 5; 450 INJECTION, SOLUTION INTRAVENOUS at 08:02

## 2023-02-25 RX ADMIN — POTASSIUM CHLORIDE, DEXTROSE MONOHYDRATE AND SODIUM CHLORIDE: 150; 5; 450 INJECTION, SOLUTION INTRAVENOUS at 11:02

## 2023-02-25 RX ADMIN — ENOXAPARIN SODIUM 40 MG: 100 INJECTION SUBCUTANEOUS at 06:02

## 2023-02-25 RX ADMIN — CEFAZOLIN 2 G: 2 INJECTION, POWDER, FOR SOLUTION INTRAMUSCULAR; INTRAVENOUS at 01:02

## 2023-02-25 NOTE — PLAN OF CARE
Problem: Physical Therapy  Goal: Physical Therapy Goal  Description: Short Term Goals to be met by: 3/8/2023    Patient will increase functional independence with mobility by performin. Supine to sit with independently  2. Sit to stand transfer with independently using no assistive device  3. Bed to chair transfer with independently using no assistive device  4. Gait  x 500 feet with independently using no assistive device    Long Term Goals to be met by: 3/25/2023    Pt will regain full independent functional mobility with no assistive device to return to home situation and prior activities of daily living.   Outcome: Ongoing, Progressing       Patient participated well with PT evaluation with CGA for gait and minimum assist for transfers. Patient should progress quickly back to independent function. Anticipate return home with spouse when medically ready for d/c.

## 2023-02-25 NOTE — PT/OT/SLP EVAL
Physical Therapy Evaluation    Patient Name:  Diogenes Aguilar   MRN:  [id number]    Recommendations:     Discharge Recommendations: home   Discharge Equipment Recommendations: none   Barriers to discharge:  ongoing medical treatment    Assessment:     Diogenes Aguilar is a 78 y.o. male admitted with a medical diagnosis of Partial small bowel obstruction.  He presents with the following impairments/functional limitations: impaired endurance, impaired functional mobility, gait instability, impaired balance, pain .    Patient participated well with PT evaluation with CGA for gait and minimum assist for transfers. Patient should progress quickly back to independent function. Anticipate return home with spouse when medically ready for d/c.    Rehab Prognosis: Good; patient would benefit from acute skilled PT services to address these deficits and reach maximum level of function.    Recent Surgery: Procedure(s) (LRB):  LAPAROTOMY, EXPLORATORY (N/A) 1 Day Post-Op    Plan:     During this hospitalization, patient to be seen 5 x/week to address the identified rehab impairments via gait training, therapeutic activities, therapeutic exercises and progress toward the following goals:    Plan of Care Expires:  03/25/23    Subjective     Chief Complaint: SBO; s/p lysis of adhesions  Patient/Family Comments/goals: patient agreeable to OOB/PT activity  Pain/Comfort:  Pain Rating 1: 2/10  Location 1: abdomen  Pain Addressed 1: Reposition, Pre-medicate for activity, Distraction, Cessation of Activity  Pain Rating Post-Intervention 1: 2/10    Patients cultural, spiritual, Buddhism conflicts given the current situation: no    Living Environment:  Pt lives with spouse in a single level home with 4 steps/rail to enter  Prior to admission, patients level of function was independent; 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  Equipment used at home: none.  DME owned (not currently used): none.  Upon discharge, patient will have assistance from  spouse.    Objective:     Communicated with Thor Pruett RN prior to session.  Patient found supine with PCA, NG tube, wound vac, peripheral IV, oxygen, SCD  upon PT entry to room.    General Precautions: Standard, fall  Orthopedic Precautions:N/A   Braces: N/A  Respiratory Status: Nasal cannula, flow 2 L/min    Exams:  Cognitive Exam:  Patient is oriented to Person, Place, Time, and Situation  Sensation:    -       Intact  Skin Integrity/Edema:      -       Skin integrity: abdominal wound with yusef vac  -       Edema: Moderate bilateral in feet  RLE ROM: WFL  RLE Strength: WFL  LLE ROM: WFL  LLE Strength: WFL    Functional Mobility:  Bed Mobility:     Rolling Right: minimum assistance  Supine to Sit: minimum assistance and verbal cues for efficiency and sequencing  Transfers:     Sit to Stand:  contact guard assistance with hand-held assist  Bed to Chair: contact guard assistance with  hand-held assist  using  Step Transfer  Gait: 120' with HHA, mild axial flexion, decreased trunk rotation, reciprocal pattern      AM-PAC 6 CLICK MOBILITY  Total Score:16       Treatment & Education:  Pt educated on PT role/POC.   Importance of OOB activity with staff assistance.  Importance of sitting up in the chair throughout the day as tolerated, especially for meals   Safety during functional t/f and mobility  White board updated   Multiple self-care tasks/functional mobility completed- assistance level noted above   All questions/concerns answered within PT scope of practice     Patient left up in chair with all lines intact, call button in reach, Thor Pruett RN notified, and spouse present.    GOALS:   Multidisciplinary Problems       Physical Therapy Goals          Problem: Physical Therapy    Goal Priority Disciplines Outcome Goal Variances Interventions   Physical Therapy Goal     PT, PT/OT Ongoing, Progressing     Description: Short Term Goals to be met by: 3/8/2023    Patient will increase functional independence  with mobility by performin. Supine to sit with independently  2. Sit to stand transfer with independently using no assistive device  3. Bed to chair transfer with independently using no assistive device  4. Gait  x 500 feet with independently using no assistive device    Long Term Goals to be met by: 3/25/2023    Pt will regain full independent functional mobility with no assistive device to return to home situation and prior activities of daily living.                        History:     Past Medical History:   Diagnosis Date    Carcinoma of prostate     Diverticula, colon 2021    Essential hypertension     GERD (gastroesophageal reflux disease)     History of colon polyps 2021    Hyperlipidemia     Hypothyroidism     Obesity     Polyp of ascending colon 2021       Past Surgical History:   Procedure Laterality Date    ABDOMINAL SURGERY N/A     blockage of the bowels    COLONOSCOPY  2021    repeat 1 year    PROSTATECTOMY      UMBILICAL HERNIA REPAIR      x 3        Time Tracking:     PT Received On: 23  PT Start Time: 1019     PT Stop Time: 1043  PT Total Time (min): 24 min     Billable Minutes: Evaluation low complexity      2023

## 2023-02-25 NOTE — PLAN OF CARE
Problem: Fall Injury Risk  Goal: Absence of Fall and Fall-Related Injury  Intervention: Identify and Manage Contributors  Flowsheets (Taken 2/24/2023 6323)  Medication Review/Management:   medications reviewed   dosing adjusted

## 2023-02-25 NOTE — OR NURSING
1628 REC'D PT TO PACU AAO. NAD NOTED. RESP EVEN & UNLABORED. O2 SAT 97% ON 6L/FM. WILL TITRATE AS NEEDED. VSS AT BASELINE. NADEEN DRAIN NOTED TO ABD W/ SMALL AMT OF DRAINAGE X2 AREAS. NGT NOTED TO R NARE INTACT. L HAND 20G INTACT & INFUSING IVF' NO SIGNS OF INFILTRATION NOTED. PT C/O OF SEVERE ABD PAIN RATED 10/10. MORPHINE PCA ORDER BUT PT HAS MORPINE ANALOGUES LISTED AS ALLERGY. NOTIFIED GEE BARTON. SCD DEVICE IN PLACE. WILL CONT TO MONITOR.     1640 NO RESPONSE FROM GEE. EXPLAINED TO PT WHAT WAS GOING ON AND ASKED WHAT HAPPENS WHEN HE TAKES MORPHINE. HE STATED IT GIVES HIM A HEADACHES. ALSO STATED HE WILL TAKE THE MORPHINE PCA BECAUSE HE IS IN SEVERE PAIN. NOTIFIED GEE OF THIS.     1645 UPDATED WIFE ON PT STATUS VIA TEXT MESSAGE.     1650 DILAUDID 0.5 MG IVP GIVEN FOR C/O ABD PAIN RATED 10/10 ON PAIN SCALE UNTIL PCA CAN BE SET UP.     1700 PCA PUMP SET UP PER ORDERS.    1730 RETURNED PT TO ROOM #457 AWAKE IN STABLE CONDITION. /83 HR 84 T 98 O2 SAT ON 2L/NC. PCA SETTINGS VERIFIED WITH 2ND RN. SCD DEVICE IN PLACE. NGT CONNECT TO LIS.      30-May-2022

## 2023-02-25 NOTE — ASSESSMENT & PLAN NOTE
2/24 exploratory laparotomy with lysis of adhesions    2/25:  Continue IV hydration.  Awaiting bowel function.  Ambulate, use incentive spirometry

## 2023-02-25 NOTE — SUBJECTIVE & OBJECTIVE
Interval History:  Stable, no acute events overnight.  No bowel function    Medications:  Continuous Infusions:   sodium chloride 0.9% 125 mL/hr at 02/24/23 1717    dextrose 5 % and 0.45 % NaCl with KCl 20 mEq 125 mL/hr at 02/25/23 1145    morphine       Scheduled Meds:   amLODIPine  5 mg Oral BID    enoxaparin  40 mg Subcutaneous Daily    lisinopriL  20 mg Oral Daily    mupirocin   Nasal BID     PRN Meds:diphenhydrAMINE, HYDROcodone-acetaminophen, ketorolac, melatonin, metoclopramide HCl, morphine, naloxone, ondansetron, ondansetron, promethazine, sodium chloride 0.9%     Review of patient's allergies indicates:   Allergen Reactions    Opioids - morphine analogues      Objective:     Vital Signs (Most Recent):  Temp: 98.3 °F (36.8 °C) (02/25/23 1600)  Pulse: 84 (02/25/23 1600)  Resp: 20 (02/25/23 1600)  BP: 135/84 (02/25/23 1600)  SpO2: 96 % (02/25/23 1600) Vital Signs (24h Range):  Temp:  [97.7 °F (36.5 °C)-98.5 °F (36.9 °C)] 98.3 °F (36.8 °C)  Pulse:  [70-92] 84  Resp:  [16-20] 20  SpO2:  [95 %-98 %] 96 %  BP: (111-143)/(63-89) 135/84     Weight: 115.7 kg (255 lb)  Body mass index is 34.58 kg/m².    Intake/Output - Last 3 Shifts         02/23 0700  02/24 0659 02/24 0700 02/25 0659 02/25 0700 02/26 0659    IV Piggyback  1700     Total Intake(mL/kg)  1700 (14.7)     Urine (mL/kg/hr)  2425 (0.9) 500 (0.4)    Blood  100     Total Output  2525 500    Net  -825 -500                   Physical Exam  Constitutional:       General: He is not in acute distress.     Appearance: Normal appearance.   HENT:      Head: Normocephalic.   Cardiovascular:      Rate and Rhythm: Normal rate.   Pulmonary:      Effort: Pulmonary effort is normal. No respiratory distress.   Abdominal:      General: There is no distension.      Tenderness: There is no abdominal tenderness.      Comments: Abdominal incision clean dry and intact with yusef dressing   Musculoskeletal:         General: Normal range of motion.   Skin:     General: Skin is  warm.      Coloration: Skin is not jaundiced.   Neurological:      General: No focal deficit present.      Mental Status: He is alert and oriented to person, place, and time.      Cranial Nerves: No cranial nerve deficit.       Significant Labs:  I have reviewed all pertinent lab results within the past 24 hours.  CBC:   Recent Labs   Lab 02/25/23 0417   WBC 13.23*   RBC 4.23*   HGB 12.5*   HCT 38.7*      MCV 91.5   MCH 29.6   MCHC 32.3     BMP:   Recent Labs   Lab 02/22/23 2206 02/24/23  0817 02/25/23 0418   *   < > 152*      < > 140   K 3.8   < > 3.7      < > 106   CO2 25   < > 26   BUN 17   < > 16   CREATININE 0.92   < > 0.79   CALCIUM 9.9   < > 8.0*   MG 2.1  --   --     < > = values in this interval not displayed.       Significant Diagnostics:  I have reviewed all pertinent imaging results/findings within the past 24 hours.

## 2023-02-25 NOTE — PROGRESS NOTES
[facility]  General Surgery  Progress Note    Subjective:     History of Present Illness:  Remote history of bowel obstruction requiring laparotomy with lysis of adhesions by Dr. Beach  Subsequent hernia repair laparoscopically with biologic mesh per Dr. Recinos 10 years ago  Patient with constipation abdominal discomfort significant weight loss since January  Colonoscopy 3 months ago per Dr. Huerta with findings of diverticulosis rectal polypectomy 2 days ago EGD with gastritis no ulcers small hiatal hernia  Pain has progressed with nausea vomiting yesterday presented to the emergency department  Findings of mild leukocytosis 13,000  CT scan concerning for dilated loops of small bowel partial small-bowel obstruction  Failed attempt at ngt insertion due to nose bleed  PMH:  HTN        Post-Op Info:  Procedure(s) (LRB):  LAPAROTOMY, EXPLORATORY (N/A)   1 Day Post-Op     Interval History:  Stable, no acute events overnight.  No bowel function    Medications:  Continuous Infusions:   sodium chloride 0.9% 125 mL/hr at 02/24/23 1717    dextrose 5 % and 0.45 % NaCl with KCl 20 mEq 125 mL/hr at 02/25/23 1145    morphine       Scheduled Meds:   amLODIPine  5 mg Oral BID    enoxaparin  40 mg Subcutaneous Daily    lisinopriL  20 mg Oral Daily    mupirocin   Nasal BID     PRN Meds:diphenhydrAMINE, HYDROcodone-acetaminophen, ketorolac, melatonin, metoclopramide HCl, morphine, naloxone, ondansetron, ondansetron, promethazine, sodium chloride 0.9%     Review of patient's allergies indicates:   Allergen Reactions    Opioids - morphine analogues      Objective:     Vital Signs (Most Recent):  Temp: 98.3 °F (36.8 °C) (02/25/23 1600)  Pulse: 84 (02/25/23 1600)  Resp: 20 (02/25/23 1600)  BP: 135/84 (02/25/23 1600)  SpO2: 96 % (02/25/23 1600) Vital Signs (24h Range):  Temp:  [97.7 °F (36.5 °C)-98.5 °F (36.9 °C)] 98.3 °F (36.8 °C)  Pulse:  [70-92] 84  Resp:  [16-20] 20  SpO2:  [95 %-98 %] 96 %  BP:  (111-143)/(63-89) 135/84     Weight: 115.7 kg (255 lb)  Body mass index is 34.58 kg/m².    Intake/Output - Last 3 Shifts         02/23 0700 02/24 0659 02/24 0700 02/25 0659 02/25 0700 02/26 0659    IV Piggyback  1700     Total Intake(mL/kg)  1700 (14.7)     Urine (mL/kg/hr)  2425 (0.9) 500 (0.4)    Blood  100     Total Output  2525 500    Net  -825 -500                   Physical Exam  Constitutional:       General: He is not in acute distress.     Appearance: Normal appearance.   HENT:      Head: Normocephalic.   Cardiovascular:      Rate and Rhythm: Normal rate.   Pulmonary:      Effort: Pulmonary effort is normal. No respiratory distress.   Abdominal:      General: There is no distension.      Tenderness: There is no abdominal tenderness.      Comments: Abdominal incision clean dry and intact with yusef dressing   Musculoskeletal:         General: Normal range of motion.   Skin:     General: Skin is warm.      Coloration: Skin is not jaundiced.   Neurological:      General: No focal deficit present.      Mental Status: He is alert and oriented to person, place, and time.      Cranial Nerves: No cranial nerve deficit.       Significant Labs:  I have reviewed all pertinent lab results within the past 24 hours.  CBC:   Recent Labs   Lab 02/25/23  0417   WBC 13.23*   RBC 4.23*   HGB 12.5*   HCT 38.7*      MCV 91.5   MCH 29.6   MCHC 32.3     BMP:   Recent Labs   Lab 02/22/23  2206 02/24/23  0817 02/25/23  0418   *   < > 152*      < > 140   K 3.8   < > 3.7      < > 106   CO2 25   < > 26   BUN 17   < > 16   CREATININE 0.92   < > 0.79   CALCIUM 9.9   < > 8.0*   MG 2.1  --   --     < > = values in this interval not displayed.       Significant Diagnostics:  I have reviewed all pertinent imaging results/findings within the past 24 hours.    Assessment/Plan:     * Partial small bowel obstruction  2/24 exploratory laparotomy with lysis of adhesions    2/25:  Continue IV hydration.  Awaiting bowel  function.  Ambulate, use incentive spirometry        Yordan Villatoro, DO  General Surgery  [facility]

## 2023-02-25 NOTE — PLAN OF CARE
Problem: Adult Inpatient Plan of Care  Goal: Plan of Care Review  2/25/2023 0445 by Terri Moore RN  Outcome: Ongoing, Progressing  2/25/2023 0444 by Terri Moore RN  Outcome: Ongoing, Progressing  Goal: Optimal Comfort and Wellbeing  2/25/2023 0445 by Terri Moore RN  Outcome: Ongoing, Progressing  2/25/2023 0444 by Terri Moore RN  Outcome: Ongoing, Progressing     Problem: Diabetes Comorbidity  Goal: Blood Glucose Level Within Targeted Range  2/25/2023 0445 by Terri Moore RN  Outcome: Ongoing, Progressing  2/25/2023 0444 by Terri Moore RN  Outcome: Ongoing, Progressing     Problem: Fall Injury Risk  Goal: Absence of Fall and Fall-Related Injury  Outcome: Ongoing, Progressing

## 2023-02-25 NOTE — ANESTHESIA POSTPROCEDURE EVALUATION
Anesthesia Post Evaluation    Patient: Diogenes Aguilar    Procedure(s) Performed: Procedure(s) (LRB):  LAPAROTOMY, EXPLORATORY (N/A)    Final Anesthesia Type: general      Patient location during evaluation: PACU  Post-procedure vital signs: reviewed and stable  Pain management: adequate  Airway patency: patent    PONV status at discharge: No PONV  Anesthetic complications: no      Cardiovascular status: hemodynamically stable  Respiratory status: unassisted  Hydration status: euvolemic  Follow-up not needed.          Vitals Value Taken Time   /84 02/24/23 1845   Temp 36.7 °C (98 °F) 02/24/23 1730   Pulse 86 02/24/23 1845   Resp 16 02/24/23 1730   SpO2 96 % 02/24/23 1730         Event Time   Out of Recovery 02/24/2023 17:20:00         Pain/Paramjit Score: Pain Rating Prior to Med Admin: 10 (2/24/2023  4:50 PM)  Paramjit Score: 9 (2/24/2023  5:15 PM)

## 2023-02-26 PROCEDURE — 25000003 PHARM REV CODE 250: Performed by: NURSE PRACTITIONER

## 2023-02-26 PROCEDURE — 94761 N-INVAS EAR/PLS OXIMETRY MLT: CPT

## 2023-02-26 PROCEDURE — 63600175 PHARM REV CODE 636 W HCPCS: Performed by: STUDENT IN AN ORGANIZED HEALTH CARE EDUCATION/TRAINING PROGRAM

## 2023-02-26 PROCEDURE — 11000001 HC ACUTE MED/SURG PRIVATE ROOM

## 2023-02-26 PROCEDURE — 63600175 PHARM REV CODE 636 W HCPCS: Performed by: NURSE PRACTITIONER

## 2023-02-26 PROCEDURE — 25000003 PHARM REV CODE 250: Performed by: STUDENT IN AN ORGANIZED HEALTH CARE EDUCATION/TRAINING PROGRAM

## 2023-02-26 RX ORDER — ADHESIVE BANDAGE
30 BANDAGE TOPICAL DAILY PRN
Status: DISCONTINUED | OUTPATIENT
Start: 2023-02-26 | End: 2023-03-01 | Stop reason: HOSPADM

## 2023-02-26 RX ORDER — KETOROLAC TROMETHAMINE 15 MG/ML
15 INJECTION, SOLUTION INTRAMUSCULAR; INTRAVENOUS EVERY 6 HOURS PRN
Status: DISPENSED | OUTPATIENT
Start: 2023-02-26 | End: 2023-03-01

## 2023-02-26 RX ADMIN — POTASSIUM CHLORIDE, DEXTROSE MONOHYDRATE AND SODIUM CHLORIDE: 150; 5; 450 INJECTION, SOLUTION INTRAVENOUS at 02:02

## 2023-02-26 RX ADMIN — MUPIROCIN: 20 OINTMENT TOPICAL at 10:02

## 2023-02-26 RX ADMIN — AMLODIPINE BESYLATE 5 MG: 5 TABLET ORAL at 10:02

## 2023-02-26 RX ADMIN — AMLODIPINE BESYLATE 5 MG: 5 TABLET ORAL at 08:02

## 2023-02-26 RX ADMIN — ENOXAPARIN SODIUM 40 MG: 100 INJECTION SUBCUTANEOUS at 07:02

## 2023-02-26 RX ADMIN — KETOROLAC TROMETHAMINE 15 MG: 15 INJECTION, SOLUTION INTRAMUSCULAR; INTRAVENOUS at 11:02

## 2023-02-26 RX ADMIN — LISINOPRIL 20 MG: 20 TABLET ORAL at 08:02

## 2023-02-26 RX ADMIN — MAGNESIUM HYDROXIDE 2400 MG: 400 SUSPENSION ORAL at 04:02

## 2023-02-26 RX ADMIN — KETOROLAC TROMETHAMINE 15 MG: 15 INJECTION, SOLUTION INTRAMUSCULAR; INTRAVENOUS at 10:02

## 2023-02-26 RX ADMIN — POTASSIUM CHLORIDE, DEXTROSE MONOHYDRATE AND SODIUM CHLORIDE: 150; 5; 450 INJECTION, SOLUTION INTRAVENOUS at 10:02

## 2023-02-26 RX ADMIN — MORPHINE SULFATE: 1 INJECTION INTRAVENOUS at 02:02

## 2023-02-26 NOTE — PLAN OF CARE
Problem: Adult Inpatient Plan of Care  Goal: Plan of Care Review  Outcome: Ongoing, Progressing  Goal: Patient-Specific Goal (Individualized)  Outcome: Ongoing, Progressing  Goal: Absence of Hospital-Acquired Illness or Injury  Outcome: Ongoing, Progressing  Goal: Optimal Comfort and Wellbeing  Outcome: Ongoing, Progressing  Goal: Readiness for Transition of Care  Outcome: Ongoing, Progressing     Problem: Diabetes Comorbidity  Goal: Blood Glucose Level Within Targeted Range  Outcome: Ongoing, Progressing     Problem: Fall Injury Risk  Goal: Absence of Fall and Fall-Related Injury  Outcome: Ongoing, Progressing     Problem: Infection  Goal: Absence of Infection Signs and Symptoms  Outcome: Ongoing, Progressing

## 2023-02-26 NOTE — ASSESSMENT & PLAN NOTE
2/24 exploratory laparotomy with lysis of adhesions    2/25:  Continue IV hydration.  Awaiting bowel function.  Ambulate, use incentive spirometry    2/26: no change; awaiting bowel function; OK for sips/chips/popsicles

## 2023-02-26 NOTE — PROGRESS NOTES
[facility]  General Surgery  Progress Note    Subjective:     History of Present Illness:  Remote history of bowel obstruction requiring laparotomy with lysis of adhesions by Dr. Beach  Subsequent hernia repair laparoscopically with biologic mesh per Dr. Recinos 10 years ago  Patient with constipation abdominal discomfort significant weight loss since January  Colonoscopy 3 months ago per Dr. Huerta with findings of diverticulosis rectal polypectomy 2 days ago EGD with gastritis no ulcers small hiatal hernia  Pain has progressed with nausea vomiting yesterday presented to the emergency department  Findings of mild leukocytosis 13,000  CT scan concerning for dilated loops of small bowel partial small-bowel obstruction  Failed attempt at ngt insertion due to nose bleed  PMH:  HTN        Post-Op Info:  Procedure(s) (LRB):  LAPAROTOMY, EXPLORATORY (N/A)   2 Days Post-Op     Interval History:  Stable, no acute events overnight.  No bowel function    Medications:  Continuous Infusions:   sodium chloride 0.9% 125 mL/hr at 02/24/23 1717    dextrose 5 % and 0.45 % NaCl with KCl 20 mEq 125 mL/hr at 02/26/23 0245    morphine       Scheduled Meds:   amLODIPine  5 mg Oral BID    enoxaparin  40 mg Subcutaneous Daily    lisinopriL  20 mg Oral Daily    mupirocin   Nasal BID     PRN Meds:diphenhydrAMINE, HYDROcodone-acetaminophen, melatonin, metoclopramide HCl, morphine, naloxone, ondansetron, ondansetron, promethazine, sodium chloride 0.9%     Review of patient's allergies indicates:   Allergen Reactions    Opioids - morphine analogues      Objective:     Vital Signs (Most Recent):  Temp: 98.1 °F (36.7 °C) (02/26/23 0600)  Pulse: 88 (02/26/23 0610)  Resp: 18 (02/26/23 0610)  BP: (!) 141/91 (02/26/23 0600)  SpO2: (!) 94 % (02/26/23 0610) Vital Signs (24h Range):  Temp:  [97.9 °F (36.6 °C)-98.7 °F (37.1 °C)] 98.1 °F (36.7 °C)  Pulse:  [80-92] 88  Resp:  [16-20] 18  SpO2:  [92 %-98 %] 94 %  BP:  (127-141)/(78-91) 141/91     Weight: 115.7 kg (255 lb)  Body mass index is 34.58 kg/m².    Intake/Output - Last 3 Shifts         02/24 0700 02/25 0659 02/25 0700 02/26 0659 02/26 0700 02/27 0659    I.V. (mL/kg)  3120.8 (27)     IV Piggyback 1700 400     Total Intake(mL/kg) 1700 (14.7) 3520.8 (30.4)     Urine (mL/kg/hr) 2425 (0.9) 800 (0.3)     Drains  1000     Blood 100      Total Output 2525 1800     Net -825 +1720.8                    Physical Exam  Constitutional:       General: He is not in acute distress.     Appearance: Normal appearance.   HENT:      Head: Normocephalic.   Cardiovascular:      Rate and Rhythm: Normal rate.   Pulmonary:      Effort: Pulmonary effort is normal. No respiratory distress.   Abdominal:      General: There is no distension.      Tenderness: There is no abdominal tenderness.      Comments: Abdominal incision clean dry and intact with yusef dressing   Musculoskeletal:         General: Normal range of motion.   Skin:     General: Skin is warm.      Coloration: Skin is not jaundiced.   Neurological:      General: No focal deficit present.      Mental Status: He is alert and oriented to person, place, and time.      Cranial Nerves: No cranial nerve deficit.       Significant Labs:  I have reviewed all pertinent lab results within the past 24 hours.  CBC:   Recent Labs   Lab 02/25/23  0417   WBC 13.23*   RBC 4.23*   HGB 12.5*   HCT 38.7*      MCV 91.5   MCH 29.6   MCHC 32.3       BMP:   Recent Labs   Lab 02/22/23  2206 02/24/23  0817 02/25/23  0418   *   < > 152*      < > 140   K 3.8   < > 3.7      < > 106   CO2 25   < > 26   BUN 17   < > 16   CREATININE 0.92   < > 0.79   CALCIUM 9.9   < > 8.0*   MG 2.1  --   --     < > = values in this interval not displayed.         Significant Diagnostics:  I have reviewed all pertinent imaging results/findings within the past 24 hours.    Assessment/Plan:     * Partial small bowel obstruction  2/24 exploratory laparotomy  with lysis of adhesions    2/25:  Continue IV hydration.  Awaiting bowel function.  Ambulate, use incentive spirometry    2/26: no change; awaiting bowel function; OK for sips/chips/popsicles        Yordan Villatoro,   General Surgery  [facility]

## 2023-02-26 NOTE — SUBJECTIVE & OBJECTIVE
Interval History:  Stable, no acute events overnight.  No bowel function    Medications:  Continuous Infusions:   sodium chloride 0.9% 125 mL/hr at 02/24/23 1717    dextrose 5 % and 0.45 % NaCl with KCl 20 mEq 125 mL/hr at 02/26/23 0245    morphine       Scheduled Meds:   amLODIPine  5 mg Oral BID    enoxaparin  40 mg Subcutaneous Daily    lisinopriL  20 mg Oral Daily    mupirocin   Nasal BID     PRN Meds:diphenhydrAMINE, HYDROcodone-acetaminophen, melatonin, metoclopramide HCl, morphine, naloxone, ondansetron, ondansetron, promethazine, sodium chloride 0.9%     Review of patient's allergies indicates:   Allergen Reactions    Opioids - morphine analogues      Objective:     Vital Signs (Most Recent):  Temp: 98.1 °F (36.7 °C) (02/26/23 0600)  Pulse: 88 (02/26/23 0610)  Resp: 18 (02/26/23 0610)  BP: (!) 141/91 (02/26/23 0600)  SpO2: (!) 94 % (02/26/23 0610) Vital Signs (24h Range):  Temp:  [97.9 °F (36.6 °C)-98.7 °F (37.1 °C)] 98.1 °F (36.7 °C)  Pulse:  [80-92] 88  Resp:  [16-20] 18  SpO2:  [92 %-98 %] 94 %  BP: (127-141)/(78-91) 141/91     Weight: 115.7 kg (255 lb)  Body mass index is 34.58 kg/m².    Intake/Output - Last 3 Shifts         02/24 0700 02/25 0659 02/25 0700 02/26 0659 02/26 0700 02/27 0659    I.V. (mL/kg)  3120.8 (27)     IV Piggyback 1700 400     Total Intake(mL/kg) 1700 (14.7) 3520.8 (30.4)     Urine (mL/kg/hr) 2425 (0.9) 800 (0.3)     Drains  1000     Blood 100      Total Output 2525 1800     Net -825 +1720.8                    Physical Exam  Constitutional:       General: He is not in acute distress.     Appearance: Normal appearance.   HENT:      Head: Normocephalic.   Cardiovascular:      Rate and Rhythm: Normal rate.   Pulmonary:      Effort: Pulmonary effort is normal. No respiratory distress.   Abdominal:      General: There is no distension.      Tenderness: There is no abdominal tenderness.      Comments: Abdominal incision clean dry and intact with yusef dressing   Musculoskeletal:          General: Normal range of motion.   Skin:     General: Skin is warm.      Coloration: Skin is not jaundiced.   Neurological:      General: No focal deficit present.      Mental Status: He is alert and oriented to person, place, and time.      Cranial Nerves: No cranial nerve deficit.       Significant Labs:  I have reviewed all pertinent lab results within the past 24 hours.  CBC:   Recent Labs   Lab 02/25/23  0417   WBC 13.23*   RBC 4.23*   HGB 12.5*   HCT 38.7*      MCV 91.5   MCH 29.6   MCHC 32.3       BMP:   Recent Labs   Lab 02/22/23  2206 02/24/23  0817 02/25/23  0418   *   < > 152*      < > 140   K 3.8   < > 3.7      < > 106   CO2 25   < > 26   BUN 17   < > 16   CREATININE 0.92   < > 0.79   CALCIUM 9.9   < > 8.0*   MG 2.1  --   --     < > = values in this interval not displayed.         Significant Diagnostics:  I have reviewed all pertinent imaging results/findings within the past 24 hours.

## 2023-02-27 PROCEDURE — 11000001 HC ACUTE MED/SURG PRIVATE ROOM

## 2023-02-27 PROCEDURE — 25000003 PHARM REV CODE 250: Performed by: SURGERY

## 2023-02-27 PROCEDURE — 25000003 PHARM REV CODE 250: Performed by: NURSE PRACTITIONER

## 2023-02-27 PROCEDURE — 63600175 PHARM REV CODE 636 W HCPCS: Performed by: NURSE PRACTITIONER

## 2023-02-27 PROCEDURE — 97110 THERAPEUTIC EXERCISES: CPT

## 2023-02-27 PROCEDURE — 63600175 PHARM REV CODE 636 W HCPCS: Performed by: STUDENT IN AN ORGANIZED HEALTH CARE EDUCATION/TRAINING PROGRAM

## 2023-02-27 PROCEDURE — 94761 N-INVAS EAR/PLS OXIMETRY MLT: CPT

## 2023-02-27 PROCEDURE — 63600175 PHARM REV CODE 636 W HCPCS: Performed by: EMERGENCY MEDICINE

## 2023-02-27 RX ORDER — BISACODYL 5 MG
5 TABLET, DELAYED RELEASE (ENTERIC COATED) ORAL DAILY PRN
Status: DISCONTINUED | OUTPATIENT
Start: 2023-02-27 | End: 2023-03-01 | Stop reason: HOSPADM

## 2023-02-27 RX ADMIN — PROMETHAZINE HYDROCHLORIDE 25 MG: 25 TABLET ORAL at 02:02

## 2023-02-27 RX ADMIN — LISINOPRIL 20 MG: 20 TABLET ORAL at 08:02

## 2023-02-27 RX ADMIN — MUPIROCIN: 20 OINTMENT TOPICAL at 08:02

## 2023-02-27 RX ADMIN — KETOROLAC TROMETHAMINE 15 MG: 15 INJECTION, SOLUTION INTRAMUSCULAR; INTRAVENOUS at 10:02

## 2023-02-27 RX ADMIN — KETOROLAC TROMETHAMINE 15 MG: 15 INJECTION, SOLUTION INTRAMUSCULAR; INTRAVENOUS at 04:02

## 2023-02-27 RX ADMIN — KETOROLAC TROMETHAMINE 15 MG: 15 INJECTION, SOLUTION INTRAMUSCULAR; INTRAVENOUS at 09:02

## 2023-02-27 RX ADMIN — ENOXAPARIN SODIUM 40 MG: 100 INJECTION SUBCUTANEOUS at 04:02

## 2023-02-27 RX ADMIN — METOCLOPRAMIDE 5 MG: 5 INJECTION, SOLUTION INTRAMUSCULAR; INTRAVENOUS at 09:02

## 2023-02-27 RX ADMIN — SODIUM CHLORIDE: 9 INJECTION, SOLUTION INTRAVENOUS at 07:02

## 2023-02-27 RX ADMIN — ONDANSETRON HYDROCHLORIDE 4 MG: 2 SOLUTION INTRAMUSCULAR; INTRAVENOUS at 02:02

## 2023-02-27 RX ADMIN — AMLODIPINE BESYLATE 5 MG: 5 TABLET ORAL at 08:02

## 2023-02-27 RX ADMIN — POTASSIUM CHLORIDE, DEXTROSE MONOHYDRATE AND SODIUM CHLORIDE: 150; 5; 450 INJECTION, SOLUTION INTRAVENOUS at 09:02

## 2023-02-27 RX ADMIN — ONDANSETRON HYDROCHLORIDE 4 MG: 2 SOLUTION INTRAMUSCULAR; INTRAVENOUS at 11:02

## 2023-02-27 NOTE — PLAN OF CARE
Plan of care note. Patient receiving IV fluids. Post surgery 2/24. Being seen by rehab personnel. Await bowel function. Dc plan is home with wife. Will follow.

## 2023-02-27 NOTE — PROGRESS NOTES
"[facility]  Adult Nutrition  Follow-up Note         Reason for Assessment  Reason For Assessment: RD follow-up  Nutrition Risk Screen: other (see comments) (pt NPO x5 days)    RD follow up.    Pt NPO x5 days - recommend advance diet as medically appropriate and tolerated. Recommend initiate nutrition support such as TPN/PPN if unable to advance diet d/t partial small bowel obstruction. Given pt with malnutrition, recommend monitor K, Phos, Mg and replete to WNL as needed.    Per surgery since last RD review:  "* Partial small bowel obstruction  2/24 exploratory laparotomy with lysis of adhesions     2/25:  Continue IV hydration.  Awaiting bowel function.  Ambulate, use incentive spirometry     2/26: no change; awaiting bowel function; OK for sips/chips/popsicles"    Patient NPO x5 days - recommend advance diet as medically appropriate and tolerated. Recommend initiate nutrition support such as TPN/PPN if unable to advance diet d/t partial small bowel obstruction. Given pt with malnutrition, recommend monitor K, Phos, Mg and replete to WNL as needed.    Wt Readings from Last 3 Encounters:   02/23/23 0104 115.7 kg (255 lb)   02/22/23 2104 113.4 kg (250 lb)   01/19/23 0821 120.7 kg (266 lb)   09/01/22 0748 129.3 kg (285 lb)   CBW is well above IBW range, BMI considered obesity grade I - nutrition needs adjusted. Will continue to monitor weight trend.     Last BM 2/22 per flowsheets. Will continue to monitor nutrition POC, weight trend, meds, labs, updates in patient condition - RD following.    Malnutrition  Is Patient Malnourished: Yes     RD visited patient 2/23 to perform NFPE and obtain diet and intake history. Patient reports poor intake of less than 75% for at least 7 days prior to admission. His weight is 255#. Upon chart review he is a weight loss of 33#/11.4% x 6 months (severe). His weight is down 11#/4.1% x 1 month. He meets ASPEN criteria for Mild Protein calorie Malnutrition. See " findings below.     Malnutrition Assessment  Malnutrition Type: acute illness or injury    Weight Loss (Malnutrition): 10% in 6 months  Energy Intake (Malnutrition): less than 75% for greater than 7 days  Subcutaneous Fat (Malnutrition): mild depletion  Muscle Mass (Malnutrition): mild depletion   Orbital Region (Subcutaneous Fat Loss): mild depletion  Upper Arm Region (Subcutaneous Fat Loss): mild depletion  Thoracic and Lumbar Region: mild depletion   Scientologist Region (Muscle Loss): mild depletion  Clavicle Bone Region (Muscle Loss): mild depletion       Subcutaneous Fat Loss (Final Summary): mild protein-calorie malnutrition  Muscle Loss Evaluation (Final Summary): mild protein-calorie malnutrition    Moderate Weight Loss (Malnutrition): 10% in 6 months  Severe Weight Loss (Malnutrition): greater than 10% in 6 months    Nutrition Diagnosis  Inadequate energy intake   related to Decreased ability to consume sufficient energy as evidenced by partial small bowel obstruction with decreased appetite and intaker     Nutrition Diagnosis Status: Chronic/ continues    Nutrition Risk  Level of Risk/Frequency of Follow-up: high   Chewing or Swallowing Difficulty?: pt currently NPO d/t awaiting bowel function    Estimated/Assessed Needs    Temp: 98.4 °F (36.9 °C)Oral  Weight Used For Calorie Calculations: 87.6 kg (193 lb 2 oz)   Energy Need Method: Kcal/kg Energy Calorie Requirements (kcal): 4779-6803  Weight Used For Protein Calculations: 87.6 kg (193 lb 2 oz)  Protein Requirements:   Estimated Fluid Requirement Method: RDA Method    RDA Method (mL): 2190     Nutrition Prescription / Recommendations  Recommendation/Intervention: Pt NPO x5 days - recommend advance diet as medically appropriate and tolerated. Recommend initiate nutrition support such as TPN/PPN if unable to advance diet d/t partial small bowel obstruction. Given pt with malnutrition, recommend monitor K, Phos, Mg and replete to WNL as needed.  Goals:  weight maintenance, tolerance of diet advancement as appropriate, nutrition support initiation if unable to advance diet  Nutrition Goal Status: progressing towards goal  Current Diet Order: NPO  Nutrition Order Comments: Inadequate to meet nutritional needs  Recommended Diet:  if diet advanced, recommend Consistent Carbohydrate 2000kcal diet; if unable to advance diet d/t no bowel function, recommend nutrition support such as TPN/PPN   Recommended Oral Supplement: No Oral Supplements  Is Nutrition Support Recommended: No  Is Education Recommended: No    Monitor and Evaluation  % current Intake: NPO  % intake to meet estimated needs: % of diet or nutrition support such as TPN/PPN  Food and Nutrient Intake: other (specify) (currently NPO)  Food and Nutrient Adminstration: other (specify) (currenlt NPO)  Anthropometric Measurements: weight change, weight  Biochemical Data, Medical Tests and Procedures: electrolyte and renal panel, inflammatory profile, gastrointestinal profile, lipid profile, glucose/endocrine profile  Nutrition-Focused Physical Findings: overall appearance, extremities, muscles and bones, head and eyes, skin     Current Medical Diagnosis and Past Medical History  Diagnosis: other (see comments) (partial small bowel obstruction)  Past Medical History:   Diagnosis Date    Carcinoma of prostate     Diverticula, colon 8/19/2021    Essential hypertension     GERD (gastroesophageal reflux disease)     History of colon polyps 8/19/2021    Hyperlipidemia     Hypothyroidism     Obesity     Polyp of ascending colon 8/19/2021     Nutrition/Diet History  Spiritual, Cultural Beliefs, Ep[employer] Practices, Values that Affect Care: no  Food Allergies: NKFA  Factors Affecting Nutritional Intake: NPO    Lab/Procedures/Meds  Recent Labs   Lab 02/25/23  0418      K 3.7   BUN 16   CREATININE 0.79   *   CALCIUM 8.0*        Last A1c:   Lab Results   Component Value Date    HGBA1C 5.5 07/20/2021      Lab Results   Component Value Date    RBC 4.23 (L) 02/25/2023    HGB 12.5 (L) 02/25/2023    HCT 38.7 (L) 02/25/2023    MCV 91.5 02/25/2023    MCH 29.6 02/25/2023    MCHC 32.3 02/25/2023     Pertinent Labs Reviewed: reviewed  Pertinent Labs Comments:    (H) - pt with PMH of DM; Ca 8.0 (L) - replete to WNL as needed  Pertinent Medications Reviewed: reviewed  Pertinent Medications Comments: amlodipine, enoxaparin, lisinopril, ondansetron PRN    Anthropometrics  Temp: 98.4 °F (36.9 °C)  Height Method: Stated  Height: 6' (182.9 cm)  Height (inches): 72 in  Weight Method: Bed Scale  Weight: 115.7 kg (255 lb)  Weight (lb): 255 lb  Ideal Body Weight (IBW), Male: 178 lb  % Ideal Body Weight, Male (lb): 143.26 %  BMI (Calculated): 34.6  BMI Grade: 30 - 34.9- obesity - grade I     Nutrition by Nursing  Diet/Nutrition Received: NPO, ice chips  Intake (%): 0%  Diet/Feeding Assistance: none  Last Bowel Movement: 02/22/23    Nutrition Follow-Up  RD Follow-up?: Yes

## 2023-02-27 NOTE — PT/OT/SLP PROGRESS
Physical Therapy Treatment    Patient Name:  Diogenes Aguilar   MRN:  [id number]    Recommendations:     Discharge Recommendations: home  Discharge Equipment Recommendations: walker, rolling  Barriers to discharge: Decreased caregiver support    Assessment:     Diogenes Aguilar is a 78 y.o. male admitted with a medical diagnosis of Partial small bowel obstruction.  He presents with the following impairments/functional limitations: impaired functional mobility, pain, gait instability Pt is somewhat self limiting in ambulation due to abdominal pain. HE ambulated in room with hand held assistance but returned himself to bed despite PT encouragement to sit in chair. May need rolling walker for stability.    Rehab Prognosis: Good; patient would benefit from acute skilled PT services to address these deficits and reach maximum level of function.    Recent Surgery: Procedure(s) (LRB):  LAPAROTOMY, EXPLORATORY (N/A) 3 Days Post-Op    Plan:     During this hospitalization, patient to be seen 5 x/week to address the identified rehab impairments via gait training, therapeutic activities, therapeutic exercises and progress toward the following goals:    Plan of Care Expires:  03/25/23    Subjective     Chief Complaint: abdominal pain  Patient/Family Comments/goals: Pt agreeable to PT. Reports increased pain today  Pain/Comfort:  Pain Rating 1: 5/10  Location 1: abdomen  Pain Addressed 1: Pre-medicate for activity  Pain Rating Post-Intervention 1: 5/10      Objective:     Communicated with ROJAS Babin RN prior to session.  Patient found supine with peripheral IV upon PT entry to room.     General Precautions: Standard, fall  Orthopedic Precautions: N/A  Braces: N/A  Respiratory Status: Room air     Functional Mobility:  Bed Mobility:     Scooting: contact guard assistance  Supine to Sit: minimum assistance  Sit to Supine: contact guard assistance  Transfers:     Sit to Stand:  contact guard assistance with hand-held assist  Gait: 60 ft  contact guard assistance, occasional reach for wall, slow matias, flexed posture  Balance: fair      AM-PAC 6 CLICK MOBILITY  Turning over in bed (including adjusting bedclothes, sheets and blankets)?: 3  Sitting down on and standing up from a chair with arms (e.g., wheelchair, bedside commode, etc.): 4  Moving from lying on back to sitting on the side of the bed?: 3  Moving to and from a bed to a chair (including a wheelchair)?: 3  Need to walk in hospital room?: 3  Climbing 3-5 steps with a railing?: 3  Basic Mobility Total Score: 19       Treatment & Education:  Pt performed bilateral LE: ankle pumps and Long arc quads x 30 each  Ambulation in room    Patient left HOB elevated with all lines intact and call button in reach..    GOALS:   Multidisciplinary Problems       Physical Therapy Goals          Problem: Physical Therapy    Goal Priority Disciplines Outcome Goal Variances Interventions   Physical Therapy Goal     PT, PT/OT Ongoing, Progressing     Description: Short Term Goals to be met by: 3/8/2023    Patient will increase functional independence with mobility by performin. Supine to sit with independently  2. Sit to stand transfer with independently using no assistive device  3. Bed to chair transfer with independently using no assistive device  4. Gait  x 500 feet with independently using no assistive device    Long Term Goals to be met by: 3/25/2023    Pt will regain full independent functional mobility with no assistive device to return to home situation and prior activities of daily living.                        Time Tracking:     PT Received On: 23  PT Start Time: 0949     PT Stop Time: 1008  PT Total Time (min): 19 min     Billable Minutes: Therapeutic Exercise 15    Treatment Type: Treatment  PT/PTA: PT     PTA Visit Number: 0     2023

## 2023-02-27 NOTE — PLAN OF CARE
Problem: Adult Inpatient Plan of Care  Goal: Plan of Care Review  Outcome: Ongoing, Progressing  Flowsheets (Taken 2/27/2023 0512)  Plan of Care Reviewed With:   patient   spouse  Goal: Patient-Specific Goal (Individualized)  Outcome: Ongoing, Progressing  Flowsheets (Taken 2/27/2023 0512)  Anxieties, Fears or Concerns: Pain control  Individualized Care Needs: Pain control  Goal: Absence of Hospital-Acquired Illness or Injury  Outcome: Ongoing, Progressing  Intervention: Identify and Manage Fall Risk  Flowsheets (Taken 2/27/2023 0512)  Safety Promotion/Fall Prevention:   assistive device/personal item within reach   commode/urinal/bedpan at bedside   diversional activities provided   lighting adjusted   medications reviewed   nonskid shoes/socks when out of bed   room near unit station   side rails raised x 3   instructed to call staff for mobility  Intervention: Prevent Skin Injury  Flowsheets (Taken 2/27/2023 0512)  Body Position:   position changed independently   supine  Skin Protection:   adhesive use limited   incontinence pads utilized  Intervention: Prevent and Manage VTE (Venous Thromboembolism) Risk  Flowsheets (Taken 2/27/2023 0512)  Activity Management: Arm raise - L1  VTE Prevention/Management:   ambulation promoted   fluids promoted   ROM (active) performed   intravenous hydration  Range of Motion: ROM (range of motion) performed  Intervention: Prevent Infection  Flowsheets (Taken 2/27/2023 0512)  Infection Prevention:   equipment surfaces disinfected   hand hygiene promoted   rest/sleep promoted   single patient room provided  Goal: Optimal Comfort and Wellbeing  Outcome: Ongoing, Progressing  Intervention: Monitor Pain and Promote Comfort  Flowsheets (Taken 2/27/2023 0512)  Pain Management Interventions:   care clustered   diversional activity provided   quiet environment facilitated   relaxation techniques promoted   position adjusted   pillow support provided  Intervention: Provide  Person-Centered Care  Flowsheets (Taken 2/27/2023 0512)  Trust Relationship/Rapport:   care explained   questions answered   choices provided   questions encouraged  Goal: Readiness for Transition of Care  Outcome: Ongoing, Progressing  Intervention: Mutually Develop Transition Plan  Flowsheets (Taken 2/27/2023 0512)  Equipment Currently Used at Home: none  Transportation Anticipated: family or friend will provide  Communicated AYDEN with patient/caregiver: Date not available/Unable to determine     Problem: Diabetes Comorbidity  Goal: Blood Glucose Level Within Targeted Range  Outcome: Ongoing, Progressing     Problem: Fall Injury Risk  Goal: Absence of Fall and Fall-Related Injury  Outcome: Ongoing, Progressing  Intervention: Identify and Manage Contributors  Flowsheets (Taken 2/27/2023 0512)  Medication Review/Management: medications reviewed  Intervention: Promote Injury-Free Environment  Flowsheets (Taken 2/27/2023 0512)  Safety Promotion/Fall Prevention:   assistive device/personal item within reach   commode/urinal/bedpan at bedside   diversional activities provided   lighting adjusted   medications reviewed   nonskid shoes/socks when out of bed   room near unit station   side rails raised x 3   instructed to call staff for mobility     Problem: Infection  Goal: Absence of Infection Signs and Symptoms  Outcome: Ongoing, Progressing  Intervention: Prevent or Manage Infection  Flowsheets (Taken 2/27/2023 0512)  Infection Management: aseptic technique maintained  Isolation Precautions:   protective   precautions maintained

## 2023-02-27 NOTE — NURSING
Pt removed NGT d/t not being able to breath and he refused to let us reinsert it.  I notified Dr. Osuna and he said that it does not need to be replaced and that Dr. Beach will see him tomorrow.

## 2023-02-28 ENCOUNTER — EXTERNAL CHRONIC CARE MANAGEMENT (OUTPATIENT)
Dept: PRIMARY CARE CLINIC | Facility: CLINIC | Age: 78
End: 2023-02-28
Payer: MEDICARE

## 2023-02-28 LAB
ANION GAP SERPL CALCULATED.3IONS-SCNC: 11 MMOL/L (ref 7–16)
BASOPHILS # BLD AUTO: 0.03 K/UL (ref 0–0.2)
BASOPHILS NFR BLD AUTO: 0.3 % (ref 0–1)
BUN SERPL-MCNC: 19 MG/DL (ref 7–18)
BUN/CREAT SERPL: 26 (ref 6–20)
CALCIUM SERPL-MCNC: 8.6 MG/DL (ref 8.5–10.1)
CHLORIDE SERPL-SCNC: 99 MMOL/L (ref 98–107)
CO2 SERPL-SCNC: 31 MMOL/L (ref 21–32)
CREAT SERPL-MCNC: 0.72 MG/DL (ref 0.7–1.3)
DIFFERENTIAL METHOD BLD: ABNORMAL
EGFR (NO RACE VARIABLE) (RUSH/TITUS): 95 ML/MIN/1.73M²
EOSINOPHIL # BLD AUTO: 0.16 K/UL (ref 0–0.5)
EOSINOPHIL NFR BLD AUTO: 1.6 % (ref 1–4)
ERYTHROCYTE [DISTWIDTH] IN BLOOD BY AUTOMATED COUNT: 13.4 % (ref 11.5–14.5)
ESTROGEN SERPL-MCNC: NORMAL PG/ML
GLUCOSE SERPL-MCNC: 112 MG/DL (ref 74–106)
HCT VFR BLD AUTO: 29.9 % (ref 40–54)
HGB BLD-MCNC: 10 G/DL (ref 13.5–18)
IMM GRANULOCYTES # BLD AUTO: 0.05 K/UL (ref 0–0.04)
IMM GRANULOCYTES NFR BLD: 0.5 % (ref 0–0.4)
INSULIN SERPL-ACNC: NORMAL U[IU]/ML
LAB AP GROSS DESCRIPTION: NORMAL
LAB AP LABORATORY NOTES: NORMAL
LYMPHOCYTES # BLD AUTO: 1.73 K/UL (ref 1–4.8)
LYMPHOCYTES NFR BLD AUTO: 17.1 % (ref 27–41)
MCH RBC QN AUTO: 29.6 PG (ref 27–31)
MCHC RBC AUTO-ENTMCNC: 33.4 G/DL (ref 32–36)
MCV RBC AUTO: 88.5 FL (ref 80–96)
MONOCYTES # BLD AUTO: 0.83 K/UL (ref 0–0.8)
MONOCYTES NFR BLD AUTO: 8.2 % (ref 2–6)
MPC BLD CALC-MCNC: 8.8 FL (ref 9.4–12.4)
NEUTROPHILS # BLD AUTO: 7.31 K/UL (ref 1.8–7.7)
NEUTROPHILS NFR BLD AUTO: 72.3 % (ref 53–65)
NRBC # BLD AUTO: 0 X10E3/UL
NRBC, AUTO (.00): 0 %
PLATELET # BLD AUTO: 332 K/UL (ref 150–400)
POTASSIUM SERPL-SCNC: 3.5 MMOL/L (ref 3.5–5.1)
RBC # BLD AUTO: 3.38 M/UL (ref 4.6–6.2)
SODIUM SERPL-SCNC: 137 MMOL/L (ref 136–145)
T3RU NFR SERPL: NORMAL %
WBC # BLD AUTO: 10.11 K/UL (ref 4.5–11)

## 2023-02-28 PROCEDURE — 63600175 PHARM REV CODE 636 W HCPCS: Performed by: EMERGENCY MEDICINE

## 2023-02-28 PROCEDURE — 63600175 PHARM REV CODE 636 W HCPCS: Performed by: NURSE PRACTITIONER

## 2023-02-28 PROCEDURE — G0511 PR CHRONIC CARE MGMT, RHC OR FQHC ONLY, 20 MINS OR MORE: ICD-10-PCS | Mod: ,,, | Performed by: FAMILY MEDICINE

## 2023-02-28 PROCEDURE — 63600175 PHARM REV CODE 636 W HCPCS: Performed by: STUDENT IN AN ORGANIZED HEALTH CARE EDUCATION/TRAINING PROGRAM

## 2023-02-28 PROCEDURE — 99900035 HC TECH TIME PER 15 MIN (STAT)

## 2023-02-28 PROCEDURE — 94761 N-INVAS EAR/PLS OXIMETRY MLT: CPT

## 2023-02-28 PROCEDURE — 80048 BASIC METABOLIC PNL TOTAL CA: CPT | Performed by: NURSE PRACTITIONER

## 2023-02-28 PROCEDURE — 25000003 PHARM REV CODE 250: Performed by: NURSE PRACTITIONER

## 2023-02-28 PROCEDURE — 11000001 HC ACUTE MED/SURG PRIVATE ROOM

## 2023-02-28 PROCEDURE — 85025 COMPLETE CBC W/AUTO DIFF WBC: CPT | Performed by: NURSE PRACTITIONER

## 2023-02-28 PROCEDURE — 97110 THERAPEUTIC EXERCISES: CPT

## 2023-02-28 PROCEDURE — G0511 CCM/BHI BY RHC/FQHC 20MIN MO: HCPCS | Mod: ,,, | Performed by: FAMILY MEDICINE

## 2023-02-28 RX ORDER — METOCLOPRAMIDE HYDROCHLORIDE 5 MG/ML
10 INJECTION INTRAMUSCULAR; INTRAVENOUS EVERY 6 HOURS
Status: DISCONTINUED | OUTPATIENT
Start: 2023-02-28 | End: 2023-03-01 | Stop reason: HOSPADM

## 2023-02-28 RX ORDER — SODIUM CHLORIDE 9 MG/ML
INJECTION, SOLUTION INTRAVENOUS CONTINUOUS
Status: DISCONTINUED | OUTPATIENT
Start: 2023-02-28 | End: 2023-03-01 | Stop reason: HOSPADM

## 2023-02-28 RX ADMIN — METOCLOPRAMIDE 10 MG: 5 INJECTION, SOLUTION INTRAMUSCULAR; INTRAVENOUS at 06:02

## 2023-02-28 RX ADMIN — PROMETHAZINE HYDROCHLORIDE 25 MG: 25 TABLET ORAL at 02:02

## 2023-02-28 RX ADMIN — KETOROLAC TROMETHAMINE 15 MG: 15 INJECTION, SOLUTION INTRAMUSCULAR; INTRAVENOUS at 04:02

## 2023-02-28 RX ADMIN — KETOROLAC TROMETHAMINE 15 MG: 15 INJECTION, SOLUTION INTRAMUSCULAR; INTRAVENOUS at 08:02

## 2023-02-28 RX ADMIN — AMLODIPINE BESYLATE 5 MG: 5 TABLET ORAL at 10:02

## 2023-02-28 RX ADMIN — MUPIROCIN: 20 OINTMENT TOPICAL at 08:02

## 2023-02-28 RX ADMIN — KETOROLAC TROMETHAMINE 15 MG: 15 INJECTION, SOLUTION INTRAMUSCULAR; INTRAVENOUS at 10:02

## 2023-02-28 RX ADMIN — AMLODIPINE BESYLATE 5 MG: 5 TABLET ORAL at 08:02

## 2023-02-28 RX ADMIN — ONDANSETRON HYDROCHLORIDE 4 MG: 2 SOLUTION INTRAMUSCULAR; INTRAVENOUS at 09:02

## 2023-02-28 RX ADMIN — HYDROCODONE BITARTRATE AND ACETAMINOPHEN 1 TABLET: 10; 325 TABLET ORAL at 12:02

## 2023-02-28 RX ADMIN — LISINOPRIL 20 MG: 20 TABLET ORAL at 10:02

## 2023-02-28 RX ADMIN — ENOXAPARIN SODIUM 40 MG: 100 INJECTION SUBCUTANEOUS at 06:02

## 2023-02-28 RX ADMIN — MUPIROCIN: 20 OINTMENT TOPICAL at 09:02

## 2023-02-28 RX ADMIN — METOCLOPRAMIDE 10 MG: 5 INJECTION, SOLUTION INTRAMUSCULAR; INTRAVENOUS at 01:02

## 2023-02-28 RX ADMIN — SODIUM CHLORIDE: 9 INJECTION, SOLUTION INTRAVENOUS at 03:02

## 2023-02-28 NOTE — PROGRESS NOTES
[facility]  General Surgery  Progress Note    Subjective:     History of Present Illness:  Remote history of bowel obstruction requiring laparotomy with lysis of adhesions by Dr. Beach  Subsequent hernia repair laparoscopically with biologic mesh per Dr. Recinos 10 years ago  Patient with constipation abdominal discomfort significant weight loss since January  Colonoscopy 3 months ago per Dr. Huerta with findings of diverticulosis rectal polypectomy 2 days ago EGD with gastritis no ulcers small hiatal hernia  Pain has progressed with nausea vomiting yesterday presented to the emergency department  Findings of mild leukocytosis 13,000  CT scan concerning for dilated loops of small bowel partial small-bowel obstruction  Failed attempt at ngt insertion due to nose bleed  PMH:  HTN        Post-Op Info:  Procedure(s) (LRB):  LAPAROTOMY, EXPLORATORY (N/A)   4 Days Post-Op     Interval History: +BM, nausea, no vomit, tolerated regular diet this am    Medications:  Continuous Infusions:   sodium chloride 0.9%       Scheduled Meds:   amLODIPine  5 mg Oral BID    enoxaparin  40 mg Subcutaneous Daily    lisinopriL  20 mg Oral Daily    metoclopramide HCl  10 mg Intravenous Q6H    mupirocin   Nasal BID     PRN Meds:bisacodyL, diphenhydrAMINE, HYDROcodone-acetaminophen, ketorolac, magnesium hydroxide 400 mg/5 ml, melatonin, metoclopramide HCl, morphine, naloxone, ondansetron, ondansetron, promethazine, sodium chloride 0.9%     Review of patient's allergies indicates:   Allergen Reactions    Opioids - morphine analogues      Objective:     Vital Signs (Most Recent):  Temp: 98.6 °F (37 °C) (02/28/23 0600)  Pulse: 107 (02/28/23 0600)  Resp: 18 (02/28/23 0600)  BP: 137/87 (02/28/23 0600)  SpO2: 95 % (02/28/23 0743) Vital Signs (24h Range):  Temp:  [97.9 °F (36.6 °C)-98.8 °F (37.1 °C)] 98.6 °F (37 °C)  Pulse:  [101-114] 107  Resp:  [17-18] 18  SpO2:  [94 %-96 %] 95 %  BP: (118-158)/() 137/87      Weight: 115.7 kg (255 lb)  Body mass index is 34.58 kg/m².    Intake/Output - Last 3 Shifts         02/26 0700  02/27 0659 02/27 0700 02/28 0659 02/28 0700 03/01 0659    I.V. (mL/kg)  4854.2 (42)     IV Piggyback       Total Intake(mL/kg)  4854.2 (42)     Urine (mL/kg/hr) 250 (0.1)      Drains       Total Output 250      Net -250 +4854.2            Stool Occurrence  1 x             Physical Exam  Vitals and nursing note reviewed.   HENT:      Head: Normocephalic.      Nose: Nose normal.   Eyes:      Conjunctiva/sclera: Conjunctivae normal.   Cardiovascular:      Rate and Rhythm: Tachycardia present.   Pulmonary:      Effort: Pulmonary effort is normal.   Abdominal:      Palpations: Abdomen is soft.      Comments: Midline NADEEN dressing with seal   Skin:     General: Skin is warm and dry.   Neurological:      Mental Status: He is alert and oriented to person, place, and time.   Psychiatric:         Mood and Affect: Mood normal.       Significant Labs:  I have reviewed all pertinent lab results within the past 24 hours.  CBC:   Recent Labs   Lab 02/25/23  0417   WBC 13.23*   RBC 4.23*   HGB 12.5*   HCT 38.7*      MCV 91.5   MCH 29.6   MCHC 32.3     BMP:   Recent Labs   Lab 02/22/23  2206 02/24/23  0817 02/25/23  0418   *   < > 152*      < > 140   K 3.8   < > 3.7      < > 106   CO2 25   < > 26   BUN 17   < > 16   CREATININE 0.92   < > 0.79   CALCIUM 9.9   < > 8.0*   MG 2.1  --   --     < > = values in this interval not displayed.       Significant Diagnostics:  I have reviewed all pertinent imaging results/findings within the past 24 hours.    Assessment/Plan:     * Partial small bowel obstruction  2/24 exploratory laparotomy with lysis of adhesions    2/25:  Continue IV hydration.  Awaiting bowel function.  Ambulate, use incentive spirometry    2/26: no change; awaiting bowel function; OK for sips/chips/popsicles    02/28 diet as tolerated, reglan for nausea, possible dc home am, recheck  cbc/bmp        Celestina Simpson, ACNP  General Surgery  [facility]

## 2023-02-28 NOTE — PT/OT/SLP PROGRESS
Physical Therapy Treatment    Patient Name:  Diogenes Aguilar   MRN:  [id number]    Recommendations:     Discharge Recommendations: home  Discharge Equipment Recommendations: walker, rolling  Barriers to discharge: None    Assessment:     Diogenes Aguilar is a 78 y.o. male admitted with a medical diagnosis of Partial small bowel obstruction.  He presents with the following impairments/functional limitations: impaired functional mobility, pain, gait instability Pt had less pain today. He demonstrates better bed mobility. He ambulated well with rolling walker. Will need rolling walker for d/c home.    Rehab Prognosis: Good; patient would benefit from acute skilled PT services to address these deficits and reach maximum level of function.    Recent Surgery: Procedure(s) (LRB):  LAPAROTOMY, EXPLORATORY (N/A) 4 Days Post-Op    Plan:     During this hospitalization, patient to be seen 5 x/week to address the identified rehab impairments via gait training, therapeutic activities, therapeutic exercises and progress toward the following goals:    Plan of Care Expires:  03/25/23    Subjective     Chief Complaint: abdominal pain  Patient/Family Comments/goals: Pt agreeable to PT  Pain/Comfort:  Pain Rating 1: 2/10  Location 1: abdomen  Pain Addressed 1: Pre-medicate for activity  Pain Rating Post-Intervention 1: 2/10      Objective:     Communicated with JESSEE Chase RN prior to session.  Patient found HOB elevated with peripheral IV upon PT entry to room.     General Precautions: Standard, fall  Orthopedic Precautions: N/A  Braces: N/A  Respiratory Status: Room air     Functional Mobility:  Bed Mobility:     Scooting: contact guard assistance  Supine to Sit: contact guard assistance  Transfers:     Sit to Stand:  contact guard assistance with rolling walker  Bed to Chair: contact guard assistance with  rolling walker  using  Step Transfer  Gait: 300 ft contact guard assistance with rolling walker, slight axial flexion  Balance:  Pt contacted. Provider message relayed. Pt verbalized understanding.    Pt is scheduled at 2:45pm tomorrow with PCP. Pt requested appt after 1pm as he works tomorrow.      good      AM-PAC 6 CLICK MOBILITY  Turning over in bed (including adjusting bedclothes, sheets and blankets)?: 4  Sitting down on and standing up from a chair with arms (e.g., wheelchair, bedside commode, etc.): 4  Moving from lying on back to sitting on the side of the bed?: 4  Moving to and from a bed to a chair (including a wheelchair)?: 4  Need to walk in hospital room?: 3  Climbing 3-5 steps with a railing?: 3  Basic Mobility Total Score: 22       Treatment & Education:  Pt performed bilateral LE: ankle pumps, short arc quads, heel slides, and Long arc quads x 30 each      Patient left up in chair with all lines intact and call button in reach..    GOALS:   Multidisciplinary Problems       Physical Therapy Goals          Problem: Physical Therapy    Goal Priority Disciplines Outcome Goal Variances Interventions   Physical Therapy Goal     PT, PT/OT Ongoing, Progressing     Description: Short Term Goals to be met by: 3/8/2023    Patient will increase functional independence with mobility by performin. Supine to sit with independently  2. Sit to stand transfer with independently using no assistive device  3. Bed to chair transfer with independently using no assistive device  4. Gait  x 500 feet with independently using no assistive device    Long Term Goals to be met by: 3/25/2023    Pt will regain full independent functional mobility with no assistive device to return to home situation and prior activities of daily living.                        Time Tracking:     PT Received On: 23  PT Start Time: 1336     PT Stop Time: 1353  PT Total Time (min): 17 min     Billable Minutes: Therapeutic Exercise 15    Treatment Type: Treatment  PT/PTA: PT     PTA Visit Number: 0     2023

## 2023-02-28 NOTE — SUBJECTIVE & OBJECTIVE
Interval History: +BM, nausea, no vomit, tolerated regular diet this am    Medications:  Continuous Infusions:   sodium chloride 0.9%       Scheduled Meds:   amLODIPine  5 mg Oral BID    enoxaparin  40 mg Subcutaneous Daily    lisinopriL  20 mg Oral Daily    metoclopramide HCl  10 mg Intravenous Q6H    mupirocin   Nasal BID     PRN Meds:bisacodyL, diphenhydrAMINE, HYDROcodone-acetaminophen, ketorolac, magnesium hydroxide 400 mg/5 ml, melatonin, metoclopramide HCl, morphine, naloxone, ondansetron, ondansetron, promethazine, sodium chloride 0.9%     Review of patient's allergies indicates:   Allergen Reactions    Opioids - morphine analogues      Objective:     Vital Signs (Most Recent):  Temp: 98.6 °F (37 °C) (02/28/23 0600)  Pulse: 107 (02/28/23 0600)  Resp: 18 (02/28/23 0600)  BP: 137/87 (02/28/23 0600)  SpO2: 95 % (02/28/23 0743) Vital Signs (24h Range):  Temp:  [97.9 °F (36.6 °C)-98.8 °F (37.1 °C)] 98.6 °F (37 °C)  Pulse:  [101-114] 107  Resp:  [17-18] 18  SpO2:  [94 %-96 %] 95 %  BP: (118-158)/() 137/87     Weight: 115.7 kg (255 lb)  Body mass index is 34.58 kg/m².    Intake/Output - Last 3 Shifts         02/26 0700 02/27 0659 02/27 0700 02/28 0659 02/28 0700 03/01 0659    I.V. (mL/kg)  4854.2 (42)     IV Piggyback       Total Intake(mL/kg)  4854.2 (42)     Urine (mL/kg/hr) 250 (0.1)      Drains       Total Output 250      Net -250 +4854.2            Stool Occurrence  1 x             Physical Exam  Vitals and nursing note reviewed.   HENT:      Head: Normocephalic.      Nose: Nose normal.   Eyes:      Conjunctiva/sclera: Conjunctivae normal.   Cardiovascular:      Rate and Rhythm: Tachycardia present.   Pulmonary:      Effort: Pulmonary effort is normal.   Abdominal:      Palpations: Abdomen is soft.      Comments: Midline NADEEN dressing with seal   Skin:     General: Skin is warm and dry.   Neurological:      Mental Status: He is alert and oriented to person, place, and time.   Psychiatric:         Mood  and Affect: Mood normal.       Significant Labs:  I have reviewed all pertinent lab results within the past 24 hours.  CBC:   Recent Labs   Lab 02/25/23  0417   WBC 13.23*   RBC 4.23*   HGB 12.5*   HCT 38.7*      MCV 91.5   MCH 29.6   MCHC 32.3     BMP:   Recent Labs   Lab 02/22/23  2206 02/24/23  0817 02/25/23  0418   *   < > 152*      < > 140   K 3.8   < > 3.7      < > 106   CO2 25   < > 26   BUN 17   < > 16   CREATININE 0.92   < > 0.79   CALCIUM 9.9   < > 8.0*   MG 2.1  --   --     < > = values in this interval not displayed.       Significant Diagnostics:  I have reviewed all pertinent imaging results/findings within the past 24 hours.

## 2023-02-28 NOTE — PROGRESS NOTES
"[facility]  Adult Nutrition  Follow-up Note         Reason for Assessment  Reason For Assessment: RD follow-up  Nutrition Risk Screen: no indicators present    RD follow up. Diet advanced 2/28 to regular diet. Possible d/c home tomorrow      Per surgery since last RD review:  "* Partial small bowel obstruction  2/24 exploratory laparotomy with lysis of adhesions     2/25:  Continue IV hydration.  Awaiting bowel function.  Ambulate, use incentive spirometry     2/26: no change; awaiting bowel function; OK for sips/chips/popsicles"    02/28 diet as tolerated, reglan for nausea, possible dc home am, recheck cbc/bmp  Wt Readings from Last 3 Encounters:   02/23/23 0104 115.7 kg (255 lb)   02/22/23 2104 113.4 kg (250 lb)   01/19/23 0821 120.7 kg (266 lb)   09/01/22 0748 129.3 kg (285 lb)   CBW is well above IBW range, BMI considered obesity grade I - nutrition needs adjusted. Will continue to monitor weight trend.     Last BM 2/22 per flowsheets. Will continue to monitor nutrition POC, weight trend, meds, labs, updates in patient condition - RD following.    Malnutrition  Is Patient Malnourished: Yes     RD visited patient 2/23 to perform NFPE and obtain diet and intake history. Patient reports poor intake of less than 75% for at least 7 days prior to admission. His weight is 255#. Upon chart review he is a weight loss of 33#/11.4% x 6 months (severe). His weight is down 11#/4.1% x 1 month. He meets ASPEN criteria for Mild Protein calorie Malnutrition. See findings below.     Malnutrition Assessment  Malnutrition Type: acute illness or injury    Weight Loss (Malnutrition): 10% in 6 months  Energy Intake (Malnutrition): less than 75% for greater than 7 days  Subcutaneous Fat (Malnutrition): mild depletion  Muscle Mass (Malnutrition): mild depletion   Orbital Region (Subcutaneous Fat Loss): mild depletion  Upper Arm Region (Subcutaneous Fat Loss): mild depletion  Thoracic and Lumbar Region: mild " depletion   Tenriism Region (Muscle Loss): mild depletion  Clavicle Bone Region (Muscle Loss): mild depletion       Subcutaneous Fat Loss (Final Summary): mild protein-calorie malnutrition  Muscle Loss Evaluation (Final Summary): mild protein-calorie malnutrition    Moderate Weight Loss (Malnutrition): 10% in 6 months  Severe Weight Loss (Malnutrition): greater than 10% in 6 months    Nutrition Diagnosis  Inadequate energy intake   related to Decreased ability to consume sufficient energy as evidenced by partial small bowel obstruction with decreased appetite and intaker     Nutrition Diagnosis Status: Chronic/ continues    Nutrition Risk  Level of Risk/Frequency of Follow-up: high   Chewing or Swallowing Difficulty?: pt currently NPO d/t awaiting bowel function    Estimated/Assessed Needs    Temp: 97.9 °F (36.6 °C)Oral  Weight Used For Calorie Calculations: 87.6 kg (193 lb 2 oz)   Energy Need Method: Kcal/kg Energy Calorie Requirements (kcal): 1329-2462  Weight Used For Protein Calculations: 87.6 kg (193 lb 2 oz)  Protein Requirements:   Estimated Fluid Requirement Method: RDA Method    RDA Method (mL): 2190     Nutrition Prescription / Recommendations  Recommendation/Intervention: Pt NPO x5 days - recommend advance diet as medically appropriate and tolerated. Recommend initiate nutrition support such as TPN/PPN if unable to advance diet d/t partial small bowel obstruction. Given pt with malnutrition, recommend monitor K, Phos, Mg and replete to WNL as needed.  Goals: weight maintenance, tolerance of diet advancement as appropriate, nutrition support initiation if unable to advance diet  Nutrition Goal Status: progressing towards goal  Current Diet Order: NPO  Nutrition Order Comments: Inadequate to meet nutritional needs  Recommended Diet:  if diet advanced, recommend Consistent Carbohydrate 2000kcal diet; if unable to advance diet d/t no bowel function, recommend nutrition support such as TPN/PPN    Recommended Oral Supplement: No Oral Supplements  Is Nutrition Support Recommended: No  Is Education Recommended: No    Monitor and Evaluation  % current Intake: NPO  % intake to meet estimated needs: % of diet or nutrition support such as TPN/PPN  Food and Nutrient Intake: other (specify) (currently NPO)  Food and Nutrient Adminstration: other (specify) (currenlt NPO)  Anthropometric Measurements: weight change, weight  Biochemical Data, Medical Tests and Procedures: electrolyte and renal panel, inflammatory profile, gastrointestinal profile, lipid profile, glucose/endocrine profile  Nutrition-Focused Physical Findings: overall appearance, extremities, muscles and bones, head and eyes, skin     Current Medical Diagnosis and Past Medical History  Diagnosis: other (see comments) (partial small bowel obstruction)  Past Medical History:   Diagnosis Date    Carcinoma of prostate     Diverticula, colon 8/19/2021    Essential hypertension     GERD (gastroesophageal reflux disease)     History of colon polyps 8/19/2021    Hyperlipidemia     Hypothyroidism     Obesity     Polyp of ascending colon 8/19/2021     Nutrition/Diet History  Spiritual, Cultural Beliefs, Orthodoxy Practices, Values that Affect Care: no  Food Allergies: NKFA  Factors Affecting Nutritional Intake: NPO    Lab/Procedures/Meds  Recent Labs   Lab 02/28/23  1002      K 3.5   BUN 19*   CREATININE 0.72   *   CALCIUM 8.6   CL 99       Last A1c:   Lab Results   Component Value Date    HGBA1C 5.5 07/20/2021     Lab Results   Component Value Date    RBC 3.38 (L) 02/28/2023    HGB 10.0 (L) 02/28/2023    HCT 29.9 (L) 02/28/2023    MCV 88.5 02/28/2023    MCH 29.6 02/28/2023    MCHC 33.4 02/28/2023     Pertinent Labs Reviewed: reviewed  Pertinent Labs Comments:    (H) - pt with PMH of DM; Ca 8.0 (L) - replete to WNL as needed  Pertinent Medications Reviewed: reviewed  Pertinent Medications Comments: amlodipine, enoxaparin, lisinopril,  ondansetron PRN    Anthropometrics  Temp: 97.9 °F (36.6 °C)  Height Method: Stated  Height: 6' (182.9 cm)  Height (inches): 72 in  Weight Method: Bed Scale  Weight: 115.7 kg (255 lb)  Weight (lb): 255 lb  Ideal Body Weight (IBW), Male: 178 lb  % Ideal Body Weight, Male (lb): 143.26 %  BMI (Calculated): 34.6  BMI Grade: 30 - 34.9- obesity - grade I     Nutrition by Nursing  Diet/Nutrition Received: NPO, ice chips  Intake (%):  (npo)  Diet/Feeding Assistance: none  Last Bowel Movement: 02/27/23    Nutrition Follow-Up  RD Follow-up?: Yes

## 2023-02-28 NOTE — ASSESSMENT & PLAN NOTE
2/24 exploratory laparotomy with lysis of adhesions    2/25:  Continue IV hydration.  Awaiting bowel function.  Ambulate, use incentive spirometry    2/26: no change; awaiting bowel function; OK for sips/chips/popsicles    02/28 diet as tolerated, reglan for nausea, possible dc home am, recheck cbc/bmp

## 2023-03-01 VITALS
RESPIRATION RATE: 18 BRPM | SYSTOLIC BLOOD PRESSURE: 162 MMHG | HEART RATE: 88 BPM | DIASTOLIC BLOOD PRESSURE: 99 MMHG | OXYGEN SATURATION: 96 % | WEIGHT: 255 LBS | HEIGHT: 72 IN | BODY MASS INDEX: 34.54 KG/M2 | TEMPERATURE: 98 F

## 2023-03-01 PROCEDURE — 25000003 PHARM REV CODE 250: Performed by: NURSE PRACTITIONER

## 2023-03-01 PROCEDURE — 99024 POSTOP FOLLOW-UP VISIT: CPT | Mod: ,,, | Performed by: NURSE PRACTITIONER

## 2023-03-01 PROCEDURE — 63600175 PHARM REV CODE 636 W HCPCS: Performed by: NURSE PRACTITIONER

## 2023-03-01 PROCEDURE — 63600175 PHARM REV CODE 636 W HCPCS: Performed by: STUDENT IN AN ORGANIZED HEALTH CARE EDUCATION/TRAINING PROGRAM

## 2023-03-01 PROCEDURE — 94761 N-INVAS EAR/PLS OXIMETRY MLT: CPT

## 2023-03-01 PROCEDURE — 99024 PR POST-OP FOLLOW-UP VISIT: ICD-10-PCS | Mod: ,,, | Performed by: NURSE PRACTITIONER

## 2023-03-01 RX ORDER — HYDROCODONE BITARTRATE AND ACETAMINOPHEN 7.5; 325 MG/1; MG/1
1 TABLET ORAL EVERY 6 HOURS PRN
Qty: 15 TABLET | Refills: 0 | Status: ON HOLD | OUTPATIENT
Start: 2023-03-01 | End: 2024-01-08 | Stop reason: HOSPADM

## 2023-03-01 RX ADMIN — HYDROCODONE BITARTRATE AND ACETAMINOPHEN 1 TABLET: 10; 325 TABLET ORAL at 12:03

## 2023-03-01 RX ADMIN — METOCLOPRAMIDE 10 MG: 5 INJECTION, SOLUTION INTRAMUSCULAR; INTRAVENOUS at 12:03

## 2023-03-01 RX ADMIN — LISINOPRIL 20 MG: 20 TABLET ORAL at 08:03

## 2023-03-01 RX ADMIN — MUPIROCIN: 20 OINTMENT TOPICAL at 08:03

## 2023-03-01 RX ADMIN — KETOROLAC TROMETHAMINE 15 MG: 15 INJECTION, SOLUTION INTRAMUSCULAR; INTRAVENOUS at 05:03

## 2023-03-01 RX ADMIN — METOCLOPRAMIDE 10 MG: 5 INJECTION, SOLUTION INTRAMUSCULAR; INTRAVENOUS at 05:03

## 2023-03-01 RX ADMIN — AMLODIPINE BESYLATE 5 MG: 5 TABLET ORAL at 08:03

## 2023-03-01 NOTE — PLAN OF CARE
[facility]  [facility] Final [facility]    Primary Care Provider: Timothy Solomon III, DO    Expected Discharge Date:     Final Discharge Note (most recent)       Final Note - 03/01/23 1121          Final Note    Assessment Type Final Discharge Note     Anticipated Discharge Disposition Home or Self Care        Post-Acute Status    Post-Acute Authorization HME     E Status Set-up Complete/Auth obtained                     Important Message from Medicare  Important Message from Medicare regarding Discharge Appeal Rights: Explained to patient/caregiver, Signed/date by patient/caregiver     Date IMM was signed: 03/01/23  Time IMM was signed: 1120     Follow-up providers       DREW Barrow   Specialty: Surgery    [address] [facility]  [place] [address]   Phone: [phone]       Next Steps: Schedule an appointment as soon as possible for a visit in 12 day(s)    Instructions: post op f/u              After-discharge care                Durable Medical Equipment       [facility]   Service: Durable Medical Equipment    [address] [address]   Phone: [phone]                             Patient to MD home today with wife. Ordered rolling walker from [facility]. IM done.

## 2023-03-01 NOTE — PT/OT/SLP PROGRESS
Physical Therapy      Patient Name:  Diogenes Aguilar   MRN:  [id number]    Patient not seen today secondary to Patient discharged prior to initiation of evaluation (Pt is scheduled for discharge. Pt declined PT prior to discharge stating he had already been ambulating in room).

## 2023-03-01 NOTE — PLAN OF CARE
Problem: Adult Inpatient Plan of Care  Goal: Plan of Care Review  3/1/2023 1711 by Thor Pruett RN  Outcome: Met  3/1/2023 1710 by Thor Pruett RN  Outcome: Ongoing, Progressing  Goal: Patient-Specific Goal (Individualized)  3/1/2023 1711 by Thor Pruett RN  Outcome: Met  3/1/2023 1710 by Thor Pruett RN  Outcome: Ongoing, Progressing  Goal: Absence of Hospital-Acquired Illness or Injury  3/1/2023 1711 by Thor Pruett RN  Outcome: Met  3/1/2023 1710 by Thor Pruett RN  Outcome: Ongoing, Progressing  Goal: Optimal Comfort and Wellbeing  3/1/2023 1711 by Thor Pruett RN  Outcome: Met  3/1/2023 1710 by Thor Pruett RN  Outcome: Ongoing, Progressing  Goal: Readiness for Transition of Care  3/1/2023 1711 by Thor Pruett RN  Outcome: Met  3/1/2023 1710 by Thor Pruett RN  Outcome: Ongoing, Progressing     Problem: Diabetes Comorbidity  Goal: Blood Glucose Level Within Targeted Range  3/1/2023 1711 by Thor Pruett RN  Outcome: Met  3/1/2023 1710 by Thor Pruett RN  Outcome: Ongoing, Progressing     Problem: Fall Injury Risk  Goal: Absence of Fall and Fall-Related Injury  3/1/2023 1711 by Thor Pruett RN  Outcome: Met  3/1/2023 1710 by Thor Pruett RN  Outcome: Ongoing, Progressing     Problem: Infection  Goal: Absence of Infection Signs and Symptoms  3/1/2023 1711 by Thor Pruett RN  Outcome: Met  3/1/2023 1710 by Thor Pruett RN  Outcome: Ongoing, Progressing

## 2023-03-01 NOTE — DISCHARGE SUMMARY
[facility]  General Surgery  Discharge Summary      Patient Name: Diogenes Aguilar  MRN: [id number]  Admission Date: 2/22/2023  Hospital Length of Stay: 6 days  Discharge Date and Time:  03/01/2023 9:09 AM  Attending Physician: Kaleigh Beach MD   Discharging Provider: DREW Barrow  Primary Care Provider: Timothy Solomon III,     HPI:   Remote history of bowel obstruction requiring laparotomy with lysis of adhesions by Dr. Beach  Subsequent hernia repair laparoscopically with biologic mesh per Dr. Recinos 10 years ago  Patient with constipation abdominal discomfort significant weight loss since January  Colonoscopy 3 months ago per Dr. Huerta with findings of diverticulosis rectal polypectomy 2 days ago EGD with gastritis no ulcers small hiatal hernia  Pain has progressed with nausea vomiting yesterday presented to the emergency department  Findings of mild leukocytosis 13,000  CT scan concerning for dilated loops of small bowel partial small-bowel obstruction  Failed attempt at ngt insertion due to nose bleed  PMH:  HTN        Procedure(s) (LRB):  LAPAROTOMY, EXPLORATORY (N/A)      Indwelling Lines/Drains at time of discharge:   Lines/Drains/Airways     None               Hospital Course: No notes on file  02/24  Laparotomy extensive Small bowel adhesions to old midline  mesh with explant of mesh  Post op med/surg obs once expected post op ileus resolved, ngt removed, diet advanced tolerated return of bowel function DC home POD 5 with norco Remove NADEEN dressing friday  Goals of Care Treatment Preferences:  Code Status: Full Code      Consults:     Significant Diagnostic Studies:   Specimen (24h ago, onward)    None          Pending Diagnostic Studies:     None        Final Active Diagnoses:    Diagnosis Date Noted POA    PRINCIPAL PROBLEM:  Partial small bowel obstruction [K56.600] 02/23/2023 Yes    Nausea [R11.0] 02/23/2023 Unknown      Problems Resolved During this Admission:       Discharged Condition: good    Disposition: Home or Self Care    Follow Up:   Follow-up Information     DREW Barrow. Schedule an appointment as soon as possible for a visit in 12 day(s).    Specialty: Surgery  Why: post op f/u  Contact information:  [address] [place] [address] [facility]  [place] [address]  [phone]                       Patient Instructions:      Diet Cardiac     Lifting restrictions     No driving until:     Notify your health care provider if you experience any of the following:  temperature >100.4     Notify your health care provider if you experience any of the following:  persistent nausea and vomiting or diarrhea     Notify your health care provider if you experience any of the following:  redness, tenderness, or signs of infection (pain, swelling, redness, odor or green/yellow discharge around incision site)     Remove dressing in 48 hours     Activity as tolerated     Medications:  Reconciled Home Medications:      Medication List      CHANGE how you take these medications    * HYDROcodone-acetaminophen  mg per tablet  Commonly known as: NORCO  Take 1 tablet by mouth every 6 (six) hours as needed for Pain.  What changed: Another medication with the same name was added. Make sure you understand how and when to take each.     * HYDROcodone-acetaminophen 7.5-325 mg per tablet  Commonly known as: NORCO  Take 1 tablet by mouth every 6 (six) hours as needed for Pain.  What changed: You were already taking a medication with the same name, and this prescription was added. Make sure you understand how and when to take each.         * This list has 2 medication(s) that are the same as other medications prescribed for you. Read the directions carefully, and ask your doctor or other care provider to review them with you.            CONTINUE taking these medications    amLODIPine 5 MG tablet  Commonly known as: NORVASC  Take 1 tablet (5 mg total) by mouth 2  (two) times daily.     gemfibroziL 600 MG tablet  Commonly known as: LOPID  Take 1 tablet (600 mg total) by mouth once daily. With food.     lisinopriL 20 MG tablet  Commonly known as: PRINIVIL,ZESTRIL  Take 1 tablet (20 mg total) by mouth once daily.     omeprazole 20 MG capsule  Commonly known as: PRILOSEC  Take 1 capsule (20 mg total) by mouth once daily.     pravastatin 80 MG tablet  Commonly known as: PRAVACHOL  Take 1 tablet (80 mg total) by mouth once daily.     promethazine 25 MG tablet  Commonly known as: PHENERGAN  TAKE ONE TABLET BY MOUTH EVERY SIX HOURS AS NEEDED FOR NAUSEA          Time spent on the discharge of patient: 30 minutes    DREW Barrow  General Surgery  [facility]

## 2023-03-06 RX ORDER — AMLODIPINE BESYLATE 5 MG/1
5 TABLET ORAL 2 TIMES DAILY
Qty: 180 TABLET | Refills: 3 | Status: SHIPPED | OUTPATIENT
Start: 2023-03-06 | End: 2024-04-01 | Stop reason: SDUPTHER

## 2023-03-06 RX ORDER — PRAVASTATIN SODIUM 80 MG/1
80 TABLET ORAL DAILY
Qty: 90 TABLET | Refills: 3 | Status: SHIPPED | OUTPATIENT
Start: 2023-03-06 | End: 2023-07-31

## 2023-03-06 RX ORDER — OMEPRAZOLE 20 MG/1
20 CAPSULE, DELAYED RELEASE ORAL DAILY
Qty: 90 CAPSULE | Refills: 3 | Status: SHIPPED | OUTPATIENT
Start: 2023-03-06 | End: 2024-02-22

## 2023-03-06 RX ORDER — GEMFIBROZIL 600 MG/1
600 TABLET, FILM COATED ORAL DAILY
Qty: 90 TABLET | Refills: 3 | Status: SHIPPED | OUTPATIENT
Start: 2023-03-06 | End: 2024-03-26

## 2023-03-06 RX ORDER — LISINOPRIL 20 MG/1
20 TABLET ORAL DAILY
Qty: 90 TABLET | Refills: 3 | Status: SHIPPED | OUTPATIENT
Start: 2023-03-06 | End: 2024-04-01 | Stop reason: SDUPTHER

## 2023-03-06 NOTE — PHYSICIAN QUERY
PT Name: Diogenes Aguilar  MR #: [id number]    DOCUMENTATION CLARIFICATION     CDS:  Mima AVERY, RN   Contact information:  [email or web address]    This form is a permanent document in the medical record.     Query Date: March 6, 2023    By submitting this query, we are merely seeking further clarification of documentation.. Please utilize your independent clinical judgment when addressing the question(s) below.    The medical record contains the following:   Indicators  Supporting Clinical Findings Location in Medical Record   x Energy Intake less than 75% for greater than 7 days RD 2/23/2023   x Weight Loss Upon chart review he is a weight loss of 33#/ 11.4% x 6 months severe. His weight is down 11#/4.1% x 1 month. RD 2/23/2023   x Fat Loss mild depletion RD 2/23/2023   x Muscle Loss mild depletion  RD 2/23/2023      Edema/Fluid Accumulation      Reduced  Strength (by dynamometer)     x Weight, BMI, Usual Body Weight Weight  255 lb  Usual body weight 288lbs  Ideal Body Weight  178 lb  BMI 34.6 RD 2/23/2023    Delayed Wound Healing     x Registered Dietician Diagnosis Inadequate energy intake   related to Decreased ability to consume sufficient energy as evidenced by partial small bowel obstruction with decreased appetite and intaker RD 2/23/2023   x Acute or Chronic Illness Partial small bowel obstruction ED provider note 2/22/2023    Social or Environmental Circumstances     x Treatment Nutrition Follow-Up  yes RD note 2/23/2023   x Other Patient with constipation abdominal discomfort significant weight loss since January    LAPAROTOMY, EXPLORATORY  PN 2/26/2023        OP note 2/24/2023     Academy of Nutrition and Dietetics (Academy) and the American Society for Parenteral and Enteral Nutrition (A.S.P.E.N.) Clinical Characteristics to support Malnutrition   Malnutrition in the Context of Acute Illness or Injury Malnutrition in the Context of Chronic Illness or Injury Malnutrition in the Context of Social  or Environmental Circumstances   Malnutrition Level Moderate Severe Moderate Severe   Moderate   Severe   Energy Intake <75%                   >7 days <50%                 >5 days <75%           >1 month <75%                      >1 month   <75% for >3 months   <50% for >1 month   Weight Loss   1-2% in 1 week >2% in 1 week 5% in 1 month >5% in 1 month 5% in 1 month >5% in 1 month    5% in 1 month >5% in 1 month 7.5% in 3 months >7.5% in 3 months 7.5% in 3 months >7.5% in 3 months    7.5% in 3 months >7.5% in 3 months 10% in 6 months >10% in 6 months 10% in 6 months >10% in 6 months        20% in 1 year                    >20% in 1 year                                                                  20% in 1 year                            >20% in 1 year                                                  Subcutaneous Fat Loss Mild  Moderate  Mild  Severe    Mild   Severe   Muscle Loss Mild  Moderate  Mild  Severe    Mild   Severe   Edema/Fluid Accumulation Mild Moderate to severe  Mild  Severe   Mild   Severe   Reduced  Strength         (based on standards supplied by  of dynamometer) N/A Measurably reduced N/A Measurably reduced N/A Measurably reduced     Criteria for mild malnutrition is defined as 1 characteristic outlined above within the established moderate or severe parameters.  A minimum of 2 out of the 6 characteristics noted above are recommended for a diagnosis of moderate or severe malnutrition.  Chronic illness/injury is a disease/condition lasting 3 months or longer.    The noted clinical guidelines are only system guidelines and do not replace the providers clinical judgment.    Provider, please specify the nutritional diagnosis associated with above clinical findings.    [   ] Mild Malnutrition - 1 characteristic outlined above within the established moderate or severe parameters   [   ] Abnormal Weight Loss   [   ] Malnutrition ruled out   [   ] Other Nutritional Diagnosis (please  specify): _______     Please document in your progress notes daily for the duration of treatment until resolved and  include in your discharge summary.      References:    TYLER Haider, & JOHN Winchester (2022, April). Assessment and management of anorexia and cachexia in palliative care. Retrieved May 23, 2022, from https://www.[employer]/contents/assessment-and-management-of-anorexia-and-cachexia-in-palliative-care?jshghYxc=0179&source=see_link     IVONE Greenfield, PhD, RD, Fabian ZAMUDIO P., PhD, RN, SINAN Mary MD, PhD, Garrick BARKLEY A., MS, RD, [facility], MARTÍN Guthrie, MS, RD, The Academy Malnutrition Work Group, The A.S.P.E.N. Board of Directors. (2012). Consensus Statement: Academy of Nutrition and Dietetics and American Society for Parenteral and Enteral Nutrition: Characteristics Recommended for the Identification and Documentation of Adult Malnutrition (Undernutrition). Journal of Parenteral and Enteral Nutrition, 36(3), 275-283. doi:10.1177/3347021232732086     Form No. [id number] my diagnosis

## 2023-03-13 ENCOUNTER — OFFICE VISIT (OUTPATIENT)
Dept: SURGERY | Facility: CLINIC | Age: 78
End: 2023-03-13
Payer: MEDICARE

## 2023-03-13 VITALS — WEIGHT: 255 LBS | BODY MASS INDEX: 34.54 KG/M2 | HEIGHT: 72 IN

## 2023-03-13 DIAGNOSIS — Z09 POSTOP CHECK: Primary | ICD-10-CM

## 2023-03-13 PROCEDURE — 99024 PR POST-OP FOLLOW-UP VISIT: ICD-10-PCS | Mod: S$PBB,,, | Performed by: NURSE PRACTITIONER

## 2023-03-13 PROCEDURE — 99213 OFFICE O/P EST LOW 20 MIN: CPT | Mod: PBBFAC | Performed by: NURSE PRACTITIONER

## 2023-03-13 PROCEDURE — 99024 POSTOP FOLLOW-UP VISIT: CPT | Mod: S$PBB,,, | Performed by: NURSE PRACTITIONER

## 2023-03-13 NOTE — PROGRESS NOTES
Subjective:    Two week follow-up ex lap to relieve small-bowel obstruction excision of abdominal mesh, having daily bowel movements tolerating diet no complaints   Patient ID: Diogenes Aguilar is a 78 y.o. male.    Chief Complaint: Post-op Evaluation (Exp lap)    family history includes Breast cancer in his mother.  Past Medical History:   Diagnosis Date    Carcinoma of prostate     Diverticula, colon 8/19/2021    Essential hypertension     GERD (gastroesophageal reflux disease)     History of colon polyps 8/19/2021    Hyperlipidemia     Hypothyroidism     Obesity     Polyp of ascending colon 8/19/2021      Past Surgical History:   Procedure Laterality Date    ABDOMINAL SURGERY N/A     blockage of the bowels    COLONOSCOPY  08/23/2021    repeat 1 year    LAPAROTOMY, EXPLORATORY N/A 2/24/2023    Procedure: LAPAROTOMY, EXPLORATORY;  Surgeon: Kaleigh Beach MD;  Location: [facility];  Service: General;  Laterality: N/A;  mesh removal    PROSTATECTOMY      UMBILICAL HERNIA REPAIR      x 3        reports that he has never smoked. He has never used smokeless tobacco. He reports current alcohol use. He reports that he does not use drugs.   HPI  Review of Systems      Objective:      Ht 6' (1.829 m)   Wt 115.7 kg (255 lb)   BMI 34.58 kg/m²    Physical Exam      Assessment:     Midline incision looks good no signs of infection   pathology results fibroadipose tissue with chronic inflammation  1. Postop check        Plan:     DC staples Steri-Strips applied  Progress activity as tolerated heart healthy diet no heavy lifting follow-up in 4 weeks call or return p.r.n. problems

## 2023-03-31 ENCOUNTER — EXTERNAL CHRONIC CARE MANAGEMENT (OUTPATIENT)
Dept: PRIMARY CARE CLINIC | Facility: CLINIC | Age: 78
End: 2023-03-31
Payer: MEDICARE

## 2023-03-31 PROCEDURE — G0511 PR CHRONIC CARE MGMT, RHC OR FQHC ONLY, 20 MINS OR MORE: ICD-10-PCS | Mod: ,,, | Performed by: FAMILY MEDICINE

## 2023-03-31 PROCEDURE — G0511 CCM/BHI BY RHC/FQHC 20MIN MO: HCPCS | Mod: ,,, | Performed by: FAMILY MEDICINE

## 2023-04-24 DIAGNOSIS — A04.8 H. PYLORI INFECTION: Primary | ICD-10-CM

## 2023-04-30 ENCOUNTER — EXTERNAL CHRONIC CARE MANAGEMENT (OUTPATIENT)
Dept: PRIMARY CARE CLINIC | Facility: CLINIC | Age: 78
End: 2023-04-30
Payer: MEDICARE

## 2023-04-30 PROCEDURE — G0511 PR CHRONIC CARE MGMT, RHC OR FQHC ONLY, 20 MINS OR MORE: ICD-10-PCS | Mod: ,,, | Performed by: FAMILY MEDICINE

## 2023-04-30 PROCEDURE — G0511 CCM/BHI BY RHC/FQHC 20MIN MO: HCPCS | Mod: ,,, | Performed by: FAMILY MEDICINE

## 2023-05-31 ENCOUNTER — EXTERNAL CHRONIC CARE MANAGEMENT (OUTPATIENT)
Dept: PRIMARY CARE CLINIC | Facility: CLINIC | Age: 78
End: 2023-05-31
Payer: MEDICARE

## 2023-05-31 PROCEDURE — G0511 CCM/BHI BY RHC/FQHC 20MIN MO: HCPCS | Mod: ,,, | Performed by: FAMILY MEDICINE

## 2023-05-31 PROCEDURE — G0511 PR CHRONIC CARE MGMT, RHC OR FQHC ONLY, 20 MINS OR MORE: ICD-10-PCS | Mod: ,,, | Performed by: FAMILY MEDICINE

## 2023-06-12 PROBLEM — Z09 POSTOP CHECK: Status: RESOLVED | Noted: 2023-03-13 | Resolved: 2023-06-12

## 2023-06-30 ENCOUNTER — EXTERNAL CHRONIC CARE MANAGEMENT (OUTPATIENT)
Dept: PRIMARY CARE CLINIC | Facility: CLINIC | Age: 78
End: 2023-06-30
Payer: MEDICARE

## 2023-06-30 PROCEDURE — G0511 PR CHRONIC CARE MGMT, RHC OR FQHC ONLY, 20 MINS OR MORE: ICD-10-PCS | Mod: ,,, | Performed by: FAMILY MEDICINE

## 2023-06-30 PROCEDURE — G0511 CCM/BHI BY RHC/FQHC 20MIN MO: HCPCS | Mod: ,,, | Performed by: FAMILY MEDICINE

## 2023-07-31 ENCOUNTER — EXTERNAL CHRONIC CARE MANAGEMENT (OUTPATIENT)
Dept: PRIMARY CARE CLINIC | Facility: CLINIC | Age: 78
End: 2023-07-31
Payer: MEDICARE

## 2023-07-31 PROCEDURE — G0511 CCM/BHI BY RHC/FQHC 20MIN MO: HCPCS | Mod: ,,, | Performed by: FAMILY MEDICINE

## 2023-07-31 PROCEDURE — G0511 PR CHRONIC CARE MGMT, RHC OR FQHC ONLY, 20 MINS OR MORE: ICD-10-PCS | Mod: ,,, | Performed by: FAMILY MEDICINE

## 2023-07-31 RX ORDER — PRAVASTATIN SODIUM 80 MG/1
TABLET ORAL
Qty: 90 TABLET | Refills: 3 | Status: SHIPPED | OUTPATIENT
Start: 2023-07-31

## 2023-08-31 ENCOUNTER — EXTERNAL CHRONIC CARE MANAGEMENT (OUTPATIENT)
Dept: PRIMARY CARE CLINIC | Facility: CLINIC | Age: 78
End: 2023-08-31
Payer: MEDICARE

## 2023-08-31 PROCEDURE — G0511 PR CHRONIC CARE MGMT, RHC OR FQHC ONLY, 20 MINS OR MORE: ICD-10-PCS | Mod: ,,, | Performed by: FAMILY MEDICINE

## 2023-08-31 PROCEDURE — G0511 CCM/BHI BY RHC/FQHC 20MIN MO: HCPCS | Mod: ,,, | Performed by: FAMILY MEDICINE

## 2023-09-13 ENCOUNTER — OFFICE VISIT (OUTPATIENT)
Dept: PRIMARY CARE CLINIC | Facility: CLINIC | Age: 78
End: 2023-09-13
Payer: MEDICARE

## 2023-09-13 VITALS
DIASTOLIC BLOOD PRESSURE: 72 MMHG | HEART RATE: 94 BPM | HEIGHT: 72 IN | OXYGEN SATURATION: 97 % | SYSTOLIC BLOOD PRESSURE: 114 MMHG | BODY MASS INDEX: 35.89 KG/M2 | WEIGHT: 265 LBS | RESPIRATION RATE: 18 BRPM

## 2023-09-13 DIAGNOSIS — K56.600 PARTIAL SMALL BOWEL OBSTRUCTION: ICD-10-CM

## 2023-09-13 DIAGNOSIS — K21.9 GASTROESOPHAGEAL REFLUX DISEASE, UNSPECIFIED WHETHER ESOPHAGITIS PRESENT: ICD-10-CM

## 2023-09-13 DIAGNOSIS — E66.01 MORBID OBESITY DUE TO EXCESS CALORIES: ICD-10-CM

## 2023-09-13 DIAGNOSIS — D69.2 OTHER NONTHROMBOCYTOPENIC PURPURA: ICD-10-CM

## 2023-09-13 DIAGNOSIS — E11.9 TYPE 2 DIABETES MELLITUS WITHOUT COMPLICATION, WITHOUT LONG-TERM CURRENT USE OF INSULIN: ICD-10-CM

## 2023-09-13 DIAGNOSIS — A04.8 H. PYLORI INFECTION: ICD-10-CM

## 2023-09-13 DIAGNOSIS — I10 HYPERTENSION, UNSPECIFIED TYPE: Primary | ICD-10-CM

## 2023-09-13 DIAGNOSIS — K57.30 DIVERTICULOSIS OF COLON: ICD-10-CM

## 2023-09-13 PROCEDURE — 20610 DRAIN/INJ JOINT/BURSA W/O US: CPT | Mod: RT,,, | Performed by: FAMILY MEDICINE

## 2023-09-13 PROCEDURE — 99214 OFFICE O/P EST MOD 30 MIN: CPT | Mod: ,,, | Performed by: FAMILY MEDICINE

## 2023-09-13 PROCEDURE — 99214 PR OFFICE/OUTPT VISIT, EST, LEVL IV, 30-39 MIN: ICD-10-PCS | Mod: ,,, | Performed by: FAMILY MEDICINE

## 2023-09-13 PROCEDURE — 20610 PR DRAIN/INJECT LARGE JOINT/BURSA: ICD-10-PCS | Mod: RT,,, | Performed by: FAMILY MEDICINE

## 2023-09-13 RX ORDER — DEXAMETHASONE SODIUM PHOSPHATE 4 MG/ML
4 INJECTION, SOLUTION INTRA-ARTICULAR; INTRALESIONAL; INTRAMUSCULAR; INTRAVENOUS; SOFT TISSUE
Status: COMPLETED | OUTPATIENT
Start: 2023-09-13 | End: 2023-09-13

## 2023-09-13 RX ORDER — METHYLPREDNISOLONE ACETATE 80 MG/ML
80 INJECTION, SUSPENSION INTRA-ARTICULAR; INTRALESIONAL; INTRAMUSCULAR; SOFT TISSUE
Status: COMPLETED | OUTPATIENT
Start: 2023-09-13 | End: 2023-09-13

## 2023-09-13 RX ADMIN — METHYLPREDNISOLONE ACETATE 80 MG: 80 INJECTION, SUSPENSION INTRA-ARTICULAR; INTRALESIONAL; INTRAMUSCULAR; SOFT TISSUE at 11:09

## 2023-09-13 RX ADMIN — DEXAMETHASONE SODIUM PHOSPHATE 4 MG: 4 INJECTION, SOLUTION INTRA-ARTICULAR; INTRALESIONAL; INTRAMUSCULAR; INTRAVENOUS; SOFT TISSUE at 11:09

## 2023-09-13 NOTE — PROGRESS NOTES
Subjective:      Patient ID: Diogenes Aguilar is a 78 y.o. male.    Chief Complaint: Follow-up, Diabetes, Hypertension, and (R) knee pain    Diogenes Aguilar a 78 y.o. male presents for follow up on all regular problems which are reviewed and discussed.   Wants right knee injection. Never did hpylori stool test  Problem List Items Addressed This Visit          Cardiac/Vascular    Hypertension - Primary       Hematology    Other nonthrombocytopenic purpura       Endocrine    Type 2 diabetes mellitus without complication, without long-term current use of insulin    Morbid obesity due to excess calories       GI    Diverticulosis of colon    Gastroesophageal reflux disease    Partial small bowel obstruction       Past Medical History:  Past Medical History:   Diagnosis Date    Carcinoma of prostate     Diverticula, colon 8/19/2021    Essential hypertension     GERD (gastroesophageal reflux disease)     History of colon polyps 8/19/2021    Hyperlipidemia     Hypothyroidism     Obesity     Polyp of ascending colon 8/19/2021     Past Surgical History:   Procedure Laterality Date    ABDOMINAL SURGERY N/A     blockage of the bowels    COLONOSCOPY  08/23/2021    repeat 1 year    LAPAROTOMY, EXPLORATORY N/A 2/24/2023    Procedure: LAPAROTOMY, EXPLORATORY;  Surgeon: Kaleigh Beach MD;  Location: [facility];  Service: General;  Laterality: N/A;  mesh removal    PROSTATECTOMY      UMBILICAL HERNIA REPAIR      x 3      Review of patient's allergies indicates:   Allergen Reactions    Opioids - morphine analogues      Current Outpatient Medications on File Prior to Visit   Medication Sig Dispense Refill    amLODIPine (NORVASC) 5 MG tablet Take 1 tablet (5 mg total) by mouth 2 (two) times daily. 180 tablet 3    gemfibroziL (LOPID) 600 MG tablet Take 1 tablet (600 mg total) by mouth once daily. With food. 90 tablet 3    HYDROcodone-acetaminophen (NORCO)  mg per tablet Take 1 tablet by mouth every 6 (six) hours as needed for Pain. 120  tablet 0    HYDROcodone-acetaminophen (NORCO) 7.5-325 mg per tablet Take 1 tablet by mouth every 6 (six) hours as needed for Pain. 15 tablet 0    lisinopriL (PRINIVIL,ZESTRIL) 20 MG tablet Take 1 tablet (20 mg total) by mouth once daily. 90 tablet 3    omeprazole (PRILOSEC) 20 MG capsule Take 1 capsule (20 mg total) by mouth once daily. 90 capsule 3    pravastatin (PRAVACHOL) 80 MG tablet TAKE ONE TABLET BY MOUTH ONCE DAILY 90 tablet 3    promethazine (PHENERGAN) 25 MG tablet TAKE ONE TABLET BY MOUTH EVERY SIX HOURS AS NEEDED FOR NAUSEA 25 tablet 1     No current facility-administered medications on file prior to visit.     Social History     Socioeconomic History    Marital status:    Tobacco Use    Smoking status: Never    Smokeless tobacco: Never   Substance and Sexual Activity    Alcohol use: Yes     Comment: on occasion    Drug use: Never    Sexual activity: Not Currently     Social Determinants of Health     Financial Resource Strain: Low Risk  (2/23/2023)    Overall Financial Resource Strain (CARDIA)     Difficulty of Paying Living Expenses: Not hard at all   Food Insecurity: No Food Insecurity (2/23/2023)    Hunger Vital Sign     Worried About Running Out of Food in the Last Year: Never true     Ran Out of Food in the Last Year: Never true   Transportation Needs: No Transportation Needs (2/23/2023)    PRAPARE - Transportation     Lack of Transportation (Medical): No     Lack of Transportation (Non-Medical): No   Physical Activity: Inactive (2/23/2023)    Exercise Vital Sign     Days of Exercise per Week: 0 days     Minutes of Exercise per Session: 0 min   Stress: No Stress Concern Present (2/23/2023)    [employer] - Occupational Stress Questionnaire     Feeling of Stress : Not at all   Social Connections: Moderately Isolated (2/23/2023)    Social Connection and Isolation Panel [NHANES]     Frequency of Communication with Friends and Family: More than three times a week      Frequency of Social Gatherings with Friends and Family: Once a week     Attends Quaker Services: Never     Active Member of Clubs or Organizations: No     Attends Club or Organization Meetings: Never     Marital Status:    Housing Stability: Low Risk  (2023)    Housing Stability Vital Sign     Unable to Pay for Housing in the Last Year: No     Number of Places Lived in the Last Year: 1     Unstable Housing in the Last Year: No     Family History   Problem Relation Age of Onset    Breast cancer Mother          at age 49 from breast CA with mets       Review of Systems   Constitutional: Negative.    HENT:  Negative for congestion, ear pain, nosebleeds and trouble swallowing.    Eyes:  Negative for pain and itching.   Respiratory:  Negative for chest tightness.    Cardiovascular:  Negative for chest pain.   Gastrointestinal:  Positive for abdominal pain. Negative for abdominal distention.   Endocrine: Negative for cold intolerance and heat intolerance.   Genitourinary:  Negative for difficulty urinating.   Musculoskeletal:  Positive for arthralgias and joint swelling.   Neurological:  Negative for dizziness.       Objective:     /72 (BP Location: Left arm, Patient Position: Sitting, BP Method: Large (Manual))   Pulse 94   Resp 18   Ht 6' (1.829 m)   Wt 120.2 kg (265 lb)   SpO2 97%   BMI 35.94 kg/m²     Physical Exam  Constitutional:       Appearance: Normal appearance. He is obese.   HENT:      Head: Normocephalic and atraumatic.      Right Ear: External ear normal.      Left Ear: External ear normal.      Nose: Nose normal.      Mouth/Throat:      Mouth: Mucous membranes are moist.      Pharynx: Oropharynx is clear.   Eyes:      Pupils: Pupils are equal, round, and reactive to light.   Neck:      Vascular: No carotid bruit.   Cardiovascular:      Rate and Rhythm: Normal rate and regular rhythm.      Heart sounds: Normal heart sounds. No murmur heard.     No gallop.   Pulmonary:       Effort: Pulmonary effort is normal. No respiratory distress.      Breath sounds: Normal breath sounds. No wheezing or rales.   Abdominal:      Palpations: Abdomen is soft.   Musculoskeletal:         General: Swelling, tenderness, deformity and signs of injury present. Normal range of motion.      Cervical back: Normal range of motion and neck supple.   Skin:     General: Skin is warm and dry.   Neurological:      General: No focal deficit present.      Mental Status: He is alert.   Psychiatric:         Mood and Affect: Mood normal.         Behavior: Behavior normal.         Thought Content: Thought content normal.         Judgment: Judgment normal.         1. Hypertension, unspecified type    2. Type 2 diabetes mellitus without complication, without long-term current use of insulin    3. Diverticulosis of colon    4. Gastroesophageal reflux disease, unspecified whether esophagitis present    5. Partial small bowel obstruction    6. Morbid obesity due to excess calories    7. Other nonthrombocytopenic purpura        Plan:     Problem List Items Addressed This Visit          Cardiac/Vascular    Hypertension - Primary       Hematology    Other nonthrombocytopenic purpura       Endocrine    Type 2 diabetes mellitus without complication, without long-term current use of insulin    Morbid obesity due to excess calories       GI    Diverticulosis of colon    Gastroesophageal reflux disease    Partial small bowel obstruction     No follow-ups on file.  Didn't know to not get constipated. Education.do stool test. Stay on miralax.  Procedure: right knee leaned with betadine.sterilly injected with 4mg decadron, 80mg depomedrol.  Tolerated well.  Consider ortho and knee replacement    I am having DIOGENES Aguilar maintain his HYDROcodone-acetaminophen, promethazine, HYDROcodone-acetaminophen, amLODIPine, gemfibroziL, lisinopriL, omeprazole, and pravastatin. We administered dexAMETHasone and methylPREDNISolone acetate.    Diogenes was  seen today for follow-up, diabetes, hypertension and (r) knee pain.    Diagnoses and all orders for this visit:    Hypertension, unspecified type    Type 2 diabetes mellitus without complication, without long-term current use of insulin    Diverticulosis of colon    Gastroesophageal reflux disease, unspecified whether esophagitis present    Partial small bowel obstruction    Morbid obesity due to excess calories    Other nonthrombocytopenic purpura    Other orders  -     dexAMETHasone injection 4 mg  -     methylPREDNISolone acetate injection 80 mg      Medications Ordered This Encounter   Medications    dexAMETHasone injection 4 mg    methylPREDNISolone acetate injection 80 mg     [unfilled]  No orders of the defined types were placed in this encounter.

## 2023-09-14 ENCOUNTER — TELEPHONE (OUTPATIENT)
Dept: GASTROENTEROLOGY | Facility: CLINIC | Age: 78
End: 2023-09-14
Payer: MEDICARE

## 2023-09-14 LAB — H. PYLORI STOOL: NEGATIVE

## 2023-09-14 PROCEDURE — 87338 H. PYLORI ANTIGEN, STOOL: ICD-10-PCS | Mod: ,,, | Performed by: CLINICAL MEDICAL LABORATORY

## 2023-09-14 PROCEDURE — 87338 HPYLORI STOOL AG IA: CPT | Mod: ,,, | Performed by: CLINICAL MEDICAL LABORATORY

## 2023-09-14 NOTE — TELEPHONE ENCOUNTER
Results called to patient. Verbalized understanding.          ----- Message from Rohan Raman MD sent at 9/14/2023  4:17 PM CDT -----  Stool for H. Pylori is negative.

## 2023-09-30 ENCOUNTER — EXTERNAL CHRONIC CARE MANAGEMENT (OUTPATIENT)
Dept: PRIMARY CARE CLINIC | Facility: CLINIC | Age: 78
End: 2023-09-30
Payer: MEDICARE

## 2023-09-30 PROCEDURE — G0511 PR CHRONIC CARE MGMT, RHC OR FQHC ONLY, 20 MINS OR MORE: ICD-10-PCS | Mod: ,,, | Performed by: FAMILY MEDICINE

## 2023-09-30 PROCEDURE — G0511 CCM/BHI BY RHC/FQHC 20MIN MO: HCPCS | Mod: ,,, | Performed by: FAMILY MEDICINE

## 2023-10-16 RX ORDER — HYDROCODONE BITARTRATE AND ACETAMINOPHEN 10; 325 MG/1; MG/1
1 TABLET ORAL EVERY 6 HOURS PRN
Qty: 120 TABLET | Refills: 0 | Status: ON HOLD | OUTPATIENT
Start: 2023-10-16 | End: 2024-01-08 | Stop reason: HOSPADM

## 2023-10-31 ENCOUNTER — EXTERNAL CHRONIC CARE MANAGEMENT (OUTPATIENT)
Dept: PRIMARY CARE CLINIC | Facility: CLINIC | Age: 78
End: 2023-10-31
Payer: MEDICARE

## 2023-10-31 PROCEDURE — G0511 CCM/BHI BY RHC/FQHC 20MIN MO: HCPCS | Mod: ,,, | Performed by: FAMILY MEDICINE

## 2023-10-31 PROCEDURE — G0511 PR CHRONIC CARE MGMT, RHC OR FQHC ONLY, 20 MINS OR MORE: ICD-10-PCS | Mod: ,,, | Performed by: FAMILY MEDICINE

## 2023-11-13 DIAGNOSIS — M25.561 CHRONIC PAIN OF RIGHT KNEE: Primary | ICD-10-CM

## 2023-11-13 DIAGNOSIS — G89.29 CHRONIC PAIN OF RIGHT KNEE: Primary | ICD-10-CM

## 2023-11-14 ENCOUNTER — OFFICE VISIT (OUTPATIENT)
Dept: ORTHOPEDICS | Facility: CLINIC | Age: 78
End: 2023-11-14
Payer: MEDICARE

## 2023-11-14 ENCOUNTER — HOSPITAL ENCOUNTER (OUTPATIENT)
Dept: RADIOLOGY | Facility: HOSPITAL | Age: 78
Discharge: HOME | End: 2023-11-14
Attending: ORTHOPAEDIC SURGERY
Payer: MEDICARE

## 2023-11-14 VITALS — BODY MASS INDEX: 36.57 KG/M2 | HEIGHT: 72 IN | WEIGHT: 270 LBS

## 2023-11-14 DIAGNOSIS — Z01.810 PRE-OPERATIVE CARDIOVASCULAR EXAMINATION: ICD-10-CM

## 2023-11-14 DIAGNOSIS — Z01.812 PRE-OPERATIVE LABORATORY EXAMINATION: ICD-10-CM

## 2023-11-14 DIAGNOSIS — G89.29 CHRONIC PAIN OF RIGHT KNEE: ICD-10-CM

## 2023-11-14 DIAGNOSIS — Z01.811 PRE-OPERATIVE RESPIRATORY EXAMINATION: ICD-10-CM

## 2023-11-14 DIAGNOSIS — M17.11 PRIMARY OSTEOARTHRITIS OF RIGHT KNEE: Primary | ICD-10-CM

## 2023-11-14 DIAGNOSIS — M25.561 CHRONIC PAIN OF RIGHT KNEE: ICD-10-CM

## 2023-11-14 PROCEDURE — 99204 OFFICE O/P NEW MOD 45 MIN: CPT | Mod: S$PBB,,, | Performed by: ORTHOPAEDIC SURGERY

## 2023-11-14 PROCEDURE — 73564 X-RAY EXAM KNEE 4 OR MORE: CPT | Mod: TC,RT

## 2023-11-14 PROCEDURE — 73564 XR KNEE COMP 4 OR MORE VIEWS RIGHT: ICD-10-PCS | Mod: 26,RT,, | Performed by: ORTHOPAEDIC SURGERY

## 2023-11-14 PROCEDURE — 99204 PR OFFICE/OUTPT VISIT, NEW, LEVL IV, 45-59 MIN: ICD-10-PCS | Mod: S$PBB,,, | Performed by: ORTHOPAEDIC SURGERY

## 2023-11-14 PROCEDURE — 73564 X-RAY EXAM KNEE 4 OR MORE: CPT | Mod: 26,RT,, | Performed by: ORTHOPAEDIC SURGERY

## 2023-11-14 PROCEDURE — 99213 OFFICE O/P EST LOW 20 MIN: CPT | Mod: PBBFAC | Performed by: ORTHOPAEDIC SURGERY

## 2023-11-14 NOTE — PATIENT INSTRUCTIONS
Surgery Instructions     Your surgery is scheduled for 1/8/24 at [facility] on the   ground floor of the Ambulatory building. You should arrive at 5:30 AM  at the   Ambulatory Care Center located at [address].    Pre Op Testing:     ____ Lab  (1st floor clinic)   ____ EKG  (2nd floor clinic)  ____ Chest xray (Imaging Center)     Pre Op Clearance: DR WALKER     JOINT CLASS 12/20 @ 1:00- 3RD FLOOR AMBULATORY BUILDING     Our office will contact you the day before surgery with your arrival time.  DO NOT eat or drink anything after midnight the night before surgery (this includes gum, candy, chewing tobacco, etc).  You CAN NOT drive after surgery, please arrange for someone to drive you.  Bring all medication in their original bottles.  Bathe with Hibiclens the night or morning before your surgery.  The morning of your surgery ONLY take blood pressure, heart, acid reflux, or thyroid (if you take a morning dose) medication with a sip of water.   Be sure to have stopped your blood thinner medication at the appropriate time, as instructed.  Bring your C-Pap machine if you have one.  All jewelry, piercings, or false eyelashes MUST be removed prior to surgery.

## 2023-11-14 NOTE — PROGRESS NOTES
CC:   Chief Complaint   Patient presents with    Right Knee - Pain        PREVIOUS INFO:        HISTORY:   11/14/2023    Diogenes Aguilar  is a 78 y.o. comes in with right knee pain that is steadily worsened through the years he is try to avoid total knee replacement for as long as possible he is using a cane he has a knee sleeve own and it is gotten the point where he is got have something done he has pain with use pain on rising is a preventing him from doing most of his activities that this point with pain takes anti-inflammatories in addition      PAST MEDICAL HISTORY:   Past Medical History:   Diagnosis Date    Carcinoma of prostate     Diverticula, colon 8/19/2021    Essential hypertension     GERD (gastroesophageal reflux disease)     History of colon polyps 8/19/2021    Hyperlipidemia     Hypothyroidism     Obesity     Polyp of ascending colon 8/19/2021          PAST SURGICAL HISTORY:   Past Surgical History:   Procedure Laterality Date    ABDOMINAL SURGERY N/A     blockage of the bowels    COLONOSCOPY  08/23/2021    repeat 1 year    LAPAROTOMY, EXPLORATORY N/A 2/24/2023    Procedure: LAPAROTOMY, EXPLORATORY;  Surgeon: Kaleigh Beach MD;  Location: [facility];  Service: General;  Laterality: N/A;  mesh removal    PROSTATECTOMY      UMBILICAL HERNIA REPAIR      x 3           ALLERGIES:   Review of patient's allergies indicates:   Allergen Reactions    Opioids - morphine analogues         MEDICATIONS :    Current Outpatient Medications:     amLODIPine (NORVASC) 5 MG tablet, Take 1 tablet (5 mg total) by mouth 2 (two) times daily., Disp: 180 tablet, Rfl: 3    gemfibroziL (LOPID) 600 MG tablet, Take 1 tablet (600 mg total) by mouth once daily. With food., Disp: 90 tablet, Rfl: 3    HYDROcodone-acetaminophen (NORCO)  mg per tablet, Take 1 tablet by mouth every 6 (six) hours as needed for Pain., Disp: 120 tablet, Rfl: 0    HYDROcodone-acetaminophen (NORCO) 7.5-325 mg per tablet, Take 1 tablet  by mouth every 6 (six) hours as needed for Pain., Disp: 15 tablet, Rfl: 0    lisinopriL (PRINIVIL,ZESTRIL) 20 MG tablet, Take 1 tablet (20 mg total) by mouth once daily., Disp: 90 tablet, Rfl: 3    omeprazole (PRILOSEC) 20 MG capsule, Take 1 capsule (20 mg total) by mouth once daily., Disp: 90 capsule, Rfl: 3    pravastatin (PRAVACHOL) 80 MG tablet, TAKE ONE TABLET BY MOUTH ONCE DAILY, Disp: 90 tablet, Rfl: 3    promethazine (PHENERGAN) 25 MG tablet, TAKE ONE TABLET BY MOUTH EVERY SIX HOURS AS NEEDED FOR NAUSEA, Disp: 25 tablet, Rfl: 1     SOCIAL HISTORY:   Social History     Socioeconomic History    Marital status:    Tobacco Use    Smoking status: Never    Smokeless tobacco: Never   Substance and Sexual Activity    Alcohol use: Yes     Comment: on occasion    Drug use: Never    Sexual activity: Not Currently     Social Determinants of Health     Financial Resource Strain: Low Risk  (2/23/2023)    Overall Financial Resource Strain (CARDIA)     Difficulty of Paying Living Expenses: Not hard at all   Food Insecurity: No Food Insecurity (2/23/2023)    Hunger Vital Sign     Worried About Running Out of Food in the Last Year: Never true     Ran Out of Food in the Last Year: Never true   Transportation Needs: No Transportation Needs (2/23/2023)    PRAPARE - Transportation     Lack of Transportation (Medical): No     Lack of Transportation (Non-Medical): No   Physical Activity: Inactive (2/23/2023)    Exercise Vital Sign     Days of Exercise per Week: 0 days     Minutes of Exercise per Session: 0 min   Stress: No Stress Concern Present (2/23/2023)    [employer] [place] [employer] - Occupational Stress Questionnaire     Feeling of Stress : Not at all   Social Connections: Moderately Isolated (2/23/2023)    Social Connection and Isolation Panel [NHANES]     Frequency of Communication with Friends and Family: More than three times a week     Frequency of Social Gatherings with Friends and Family: Once a  week     Attends Restorationist Services: Never     Active Member of Clubs or Organizations: No     Attends Club or Organization Meetings: Never     Marital Status:    Housing Stability: Low Risk  (2023)    Housing Stability Vital Sign     Unable to Pay for Housing in the Last Year: No     Number of Places Lived in the Last Year: 1     Unstable Housing in the Last Year: No        ROS    FAMILY HISTORY:   Family History   Problem Relation Age of Onset    Breast cancer Mother          at age 49 from breast CA with mets          PHYSICAL EXAM: There were no vitals filed for this visit.            Body mass index is 36.62 kg/m².     In general, this is a well-developed, well-nourished male . The patient is alert, oriented and cooperative.      HEENT:  Normocephalic, atraumatic.  Extraocular movements are intact bilaterally.  The oropharynx is benign.       NECK:  Nontender with good range of motion.      PULMONARY: Respirations are even and non-labored.       CARDIOVASCULAR: Pulses regular by peripheral palpation.       ABDOMEN:  Soft, non-tender, non-distended.        EXTREMITIES:  Right lower extremity palpable pulse lacks maybe 3° full extension flexion to a proximally 120 he has medial greater than lateral joint line tenderness grinding with range of motion    Ortho Exam      RADIOGRAPHIC FINDINGS:  Right knee standing x-rays with the sunrise view in addition total 4 films there is sclerotic changes with bone-on-bone medial compartment knee end-stage DJD no fracture dislocation appreciated      .      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 We discussed pros and cons of each including risks pertinent to surgery including pain, infection, bleeding, damage to adjacent structures like nerves and blood vessels, failure to heal, need for future surgeries, stiffness, instability, loss of limb, anesthesia risks like stroke, blood clot, loss of life. We discussed the possibility of need for later hardware removal in the case that hardware was used. We discussed common and uncommon risks, and discussed patient specific factors that may increase the risks present with surgery. All questions were answered. The patient expressed understanding of the pros and cons of surgery and wanted to proceed with surgical treatment.       No follow-ups on file.         Bill Avila III      (Subject to voice recognition error, transcription service not allowed)

## 2023-11-30 ENCOUNTER — EXTERNAL CHRONIC CARE MANAGEMENT (OUTPATIENT)
Dept: PRIMARY CARE CLINIC | Facility: CLINIC | Age: 78
End: 2023-11-30
Payer: MEDICARE

## 2023-11-30 PROCEDURE — G0511 CCM/BHI BY RHC/FQHC 20MIN MO: HCPCS | Mod: ,,, | Performed by: FAMILY MEDICINE

## 2023-11-30 PROCEDURE — G0511 PR CHRONIC CARE MGMT, RHC OR FQHC ONLY, 20 MINS OR MORE: ICD-10-PCS | Mod: ,,, | Performed by: FAMILY MEDICINE

## 2023-12-20 RX ORDER — BENZONATATE 200 MG/1
200 CAPSULE ORAL 3 TIMES DAILY PRN
Qty: 30 CAPSULE | Refills: 3 | Status: SHIPPED | OUTPATIENT
Start: 2023-12-20 | End: 2023-12-30

## 2023-12-20 RX ORDER — PREDNISONE 20 MG/1
40 TABLET ORAL DAILY
Qty: 10 TABLET | Refills: 0 | Status: ON HOLD | OUTPATIENT
Start: 2023-12-20 | End: 2024-01-08 | Stop reason: HOSPADM

## 2023-12-20 RX ORDER — AZITHROMYCIN 250 MG/1
TABLET, FILM COATED ORAL
Qty: 6 TABLET | Refills: 1 | Status: SHIPPED | OUTPATIENT
Start: 2023-12-20 | End: 2023-12-25

## 2023-12-31 ENCOUNTER — EXTERNAL CHRONIC CARE MANAGEMENT (OUTPATIENT)
Dept: PRIMARY CARE CLINIC | Facility: CLINIC | Age: 78
End: 2023-12-31
Payer: MEDICARE

## 2023-12-31 PROCEDURE — G0511 CCM/BHI BY RHC/FQHC 20MIN MO: HCPCS | Mod: ,,, | Performed by: FAMILY MEDICINE

## 2024-01-02 ENCOUNTER — CLINICAL SUPPORT (OUTPATIENT)
Dept: CARDIOLOGY | Facility: CLINIC | Age: 79
End: 2024-01-02
Payer: MEDICARE

## 2024-01-02 ENCOUNTER — HOSPITAL ENCOUNTER (OUTPATIENT)
Dept: RADIOLOGY | Facility: HOSPITAL | Age: 79
Discharge: HOME | End: 2024-01-02
Attending: ORTHOPAEDIC SURGERY
Payer: MEDICARE

## 2024-01-02 ENCOUNTER — OFFICE VISIT (OUTPATIENT)
Dept: ORTHOPEDICS | Facility: CLINIC | Age: 79
End: 2024-01-02
Payer: MEDICARE

## 2024-01-02 DIAGNOSIS — Z01.810 PRE-OPERATIVE CARDIOVASCULAR EXAMINATION: ICD-10-CM

## 2024-01-02 DIAGNOSIS — M17.11 PRIMARY OSTEOARTHRITIS OF RIGHT KNEE: Primary | ICD-10-CM

## 2024-01-02 DIAGNOSIS — Z01.811 PRE-OPERATIVE RESPIRATORY EXAMINATION: ICD-10-CM

## 2024-01-02 PROCEDURE — 71046 X-RAY EXAM CHEST 2 VIEWS: CPT | Mod: TC

## 2024-01-02 PROCEDURE — 99214 OFFICE O/P EST MOD 30 MIN: CPT | Mod: S$PBB,,, | Performed by: ORTHOPAEDIC SURGERY

## 2024-01-02 PROCEDURE — 93005 ELECTROCARDIOGRAM TRACING: CPT | Mod: PBBFAC | Performed by: INTERNAL MEDICINE

## 2024-01-02 PROCEDURE — 99213 OFFICE O/P EST LOW 20 MIN: CPT | Mod: PBBFAC | Performed by: ORTHOPAEDIC SURGERY

## 2024-01-02 PROCEDURE — 99212 OFFICE O/P EST SF 10 MIN: CPT | Mod: PBBFAC,25

## 2024-01-02 PROCEDURE — 93010 ELECTROCARDIOGRAM REPORT: CPT | Mod: S$PBB,,, | Performed by: INTERNAL MEDICINE

## 2024-01-02 PROCEDURE — 71046 X-RAY EXAM CHEST 2 VIEWS: CPT | Mod: 26,,, | Performed by: STUDENT IN AN ORGANIZED HEALTH CARE EDUCATION/TRAINING PROGRAM

## 2024-01-02 NOTE — PATIENT INSTRUCTIONS
Surgery Instructions     Your surgery is scheduled for 1/8/24  at [facility] on the   ground floor of the Ambulatory building. You should arrive at 5:30 AM at the   Ambulatory Care Center located at [address].    Pre Op Testing: TODAY     __X__ Lab  (1st floor clinic)   __X__ EKG  (2nd floor clinic)  __X__ Chest xray (Imaging Center)     Pre Op Clearance: DR WALKER - 1/3 @ 1:00    Our office will contact you the day before surgery with your arrival time.  DO NOT eat or drink anything after midnight the night before surgery (this includes gum, candy, chewing tobacco, etc).  You CAN NOT drive after surgery, please arrange for someone to drive you.  Bring all medication in their original bottles.  Bathe with Hibiclens the night or morning before your surgery.  The morning of your surgery ONLY take blood pressure, heart, acid reflux, or thyroid (if you take a morning dose) medication with a sip of water.   Be sure to have stopped your blood thinner medication at the appropriate time, as instructed.  Bring your C-Pap machine if you have one.  All jewelry, piercings, or false eyelashes MUST be removed prior to surgery.

## 2024-01-02 NOTE — H&P (VIEW-ONLY)
CC:   Chief Complaint   Patient presents with    Right Knee - Pain     TKR SCHEDULED ON 1/8         PREVIOUS INFO:    HISTORY:   11/14/2023    Diogenes Aguilar  is a 78 y.o. comes in with right knee pain that is steadily worsened through the years he is try to avoid total knee replacement for as long as possible he is using a cane he has a knee sleeve own and it is gotten the point where he is got have something done he has pain with use pain on rising is a preventing him from doing most of his activities that this point with pain takes anti-inflammatories in addition   MPRESSION:  End-stage DJD right knee has failed conservative therapies has postpone the surgery for probably 5-7 years risks benefits discussed at length     PLAN:  Right total knee replacement discussed risks benefits at length patient information form giving from [employer]  reviewed         HISTORY:   1/2/2024    Diogenes Aguilar  is a 78 y.o. patient has chronic right knee pain has failed conservative therapies wearing a brace he uses a stick going to occasion he is pain at night stiffness on rising affects all activities.  He is put his surgery off for years he can not take anti-inflammatories he is on chronic narcotics      PAST MEDICAL HISTORY:   Past Medical History:   Diagnosis Date    Carcinoma of prostate     Diverticula, colon 8/19/2021    Essential hypertension     GERD (gastroesophageal reflux disease)     History of colon polyps 8/19/2021    Hyperlipidemia     Hypothyroidism     Obesity     Polyp of ascending colon 8/19/2021          PAST SURGICAL HISTORY:   Past Surgical History:   Procedure Laterality Date    ABDOMINAL SURGERY N/A     blockage of the bowels    COLONOSCOPY  08/23/2021    repeat 1 year    LAPAROTOMY, EXPLORATORY N/A 2/24/2023    Procedure: LAPAROTOMY, EXPLORATORY;  Surgeon: Kaleigh Beach MD;  Location: [facility];  Service: General;  Laterality: N/A;  mesh removal    PROSTATECTOMY      UMBILICAL  HERNIA REPAIR      x 3           ALLERGIES:   Review of patient's allergies indicates:   Allergen Reactions    Opioids - morphine analogues         MEDICATIONS :  Patient reports he has had no Norco since February of last year so over so proximally a year ago  Current Outpatient Medications:     amLODIPine (NORVASC) 5 MG tablet, Take 1 tablet (5 mg total) by mouth 2 (two) times daily., Disp: 180 tablet, Rfl: 3    gemfibroziL (LOPID) 600 MG tablet, Take 1 tablet (600 mg total) by mouth once daily. With food., Disp: 90 tablet, Rfl: 3    HYDROcodone-acetaminophen (NORCO)  mg per tablet, Take 1 tablet by mouth every 6 (six) hours as needed for Pain., Disp: 120 tablet, Rfl: 0    HYDROcodone-acetaminophen (NORCO) 7.5-325 mg per tablet, Take 1 tablet by mouth every 6 (six) hours as needed for Pain., Disp: 15 tablet, Rfl: 0    lisinopriL (PRINIVIL,ZESTRIL) 20 MG tablet, Take 1 tablet (20 mg total) by mouth once daily., Disp: 90 tablet, Rfl: 3    omeprazole (PRILOSEC) 20 MG capsule, Take 1 capsule (20 mg total) by mouth once daily., Disp: 90 capsule, Rfl: 3    pravastatin (PRAVACHOL) 80 MG tablet, TAKE ONE TABLET BY MOUTH ONCE DAILY, Disp: 90 tablet, Rfl: 3    predniSONE (DELTASONE) 20 MG tablet, Take 2 tablets (40 mg total) by mouth once daily., Disp: 10 tablet, Rfl: 0    promethazine (PHENERGAN) 25 MG tablet, TAKE ONE TABLET BY MOUTH EVERY SIX HOURS AS NEEDED FOR NAUSEA, Disp: 25 tablet, Rfl: 1     SOCIAL HISTORY:   Social History     Socioeconomic History    Marital status:    Tobacco Use    Smoking status: Never    Smokeless tobacco: Never   Substance and Sexual Activity    Alcohol use: Yes     Comment: on occasion    Drug use: Never    Sexual activity: Not Currently     Social Determinants of Health     Financial Resource Strain: Low Risk  (2/23/2023)    Overall Financial Resource Strain (CARDIA)     Difficulty of Paying Living Expenses: Not hard at all   Food Insecurity: No Food Insecurity (2/23/2023)     Hunger Vital Sign     Worried About Running Out of Food in the Last Year: Never true     Ran Out of Food in the Last Year: Never true   Transportation Needs: No Transportation Needs (2023)    PRAPARE - Transportation     Lack of Transportation (Medical): No     Lack of Transportation (Non-Medical): No   Physical Activity: Inactive (2023)    Exercise Vital Sign     Days of Exercise per Week: 0 days     Minutes of Exercise per Session: 0 min   Stress: No Stress Concern Present (2023)    [employer] [place] [employer] - Occupational Stress Questionnaire     Feeling of Stress : Not at all   Social Connections: Moderately Isolated (2023)    Social Connection and Isolation Panel [NHANES]     Frequency of Communication with Friends and Family: More than three times a week     Frequency of Social Gatherings with Friends and Family: Once a week     Attends [employer] Services: Never     Active Member of Clubs or Organizations: No     Attends Club or Organization Meetings: Never     Marital Status:    Housing Stability: Low Risk  (2023)    Housing Stability Vital Sign     Unable to Pay for Housing in the Last Year: No     Number of Places Lived in the Last Year: 1     Unstable Housing in the Last Year: No        ROS    FAMILY HISTORY:   Family History   Problem Relation Age of Onset    Breast cancer Mother          at age 49 from breast CA with mets          PHYSICAL EXAM: There were no vitals filed for this visit.            There is no height or weight on file to calculate BMI.     In general, this is a well-developed, well-nourished male . The patient is alert, oriented and cooperative.      HEENT:  Normocephalic, atraumatic.  Extraocular movements are intact bilaterally.  The oropharynx is benign.       NECK:  Nontender with good range of motion.      PULMONARY: Respirations are even and non-labored.       CARDIOVASCULAR: Pulses regular by peripheral palpation.       ABDOMEN:   Soft, non-tender, non-distended.        EXTREMITIES:  Right lower extremity  Motion is 2-120 degrees he has a 2+ dorsalis pedis pulse small effusion the skin looks good he is stable to varus and valgus stressing medial joint line tenderness he does have some pes planus on the right is a lot worse on the left    Ortho Exam        November 13, 2023  RADIOGRAPHIC FINDINGS:  Right knee standing x-rays with the sunrise view in addition total 4 films there is sclerotic changes with bone-on-bone medial compartment knee end-stage DJD no fracture dislocation appreciated       .      IMPRESSION:  Right knee end-stage DJD   Chronic narcotic usage will make postoperative pain control very difficult for him patient does report now he has not had any narcotics since February of last year so they should be a lot better    PLAN:  Right total knee replacement long rehab course discussed at length importance rehab discussed other risks benefits discussed        I had a long discussion with the patient about treatment options, including operative and nonoperative treatments. We discussed pros and cons of each including risks pertinent to surgery including pain, infection, bleeding, damage to adjacent structures like nerves and blood vessels, failure to heal, need for future surgeries, stiffness, instability, loss of limb, anesthesia risks like stroke, blood clot, loss of life. We discussed the possibility of need for later hardware removal in the case that hardware was used. We discussed common and uncommon risks, and discussed patient specific factors that may increase the risks present with surgery. All questions were answered. The patient expressed understanding of the pros and cons of surgery and wanted to proceed with surgical treatment.         No follow-ups on file.         Bill Avila III      (Subject to voice recognition error, transcription service not allowed)

## 2024-01-02 NOTE — PROGRESS NOTES
CC:   Chief Complaint   Patient presents with    Right Knee - Pain     TKR SCHEDULED ON 1/8         PREVIOUS INFO:    HISTORY:   11/14/2023    Diogenes Aguilar  is a 78 y.o. comes in with right knee pain that is steadily worsened through the years he is try to avoid total knee replacement for as long as possible he is using a cane he has a knee sleeve own and it is gotten the point where he is got have something done he has pain with use pain on rising is a preventing him from doing most of his activities that this point with pain takes anti-inflammatories in addition   MPRESSION:  End-stage DJD right knee has failed conservative therapies has postpone the surgery for probably 5-7 years risks benefits discussed at length     PLAN:  Right total knee replacement discussed risks benefits at length patient information form giving from my [employer]  reviewed         HISTORY:   1/2/2024    Diogenes Aguilar  is a 78 y.o. patient has chronic right knee pain has failed conservative therapies wearing a brace he uses a stick going to occasion he is pain at night stiffness on rising affects all activities.  He is put his surgery off for years he can not take anti-inflammatories he is on chronic narcotics      PAST MEDICAL HISTORY:   Past Medical History:   Diagnosis Date    Carcinoma of prostate     Diverticula, colon 8/19/2021    Essential hypertension     GERD (gastroesophageal reflux disease)     History of colon polyps 8/19/2021    Hyperlipidemia     Hypothyroidism     Obesity     Polyp of ascending colon 8/19/2021          PAST SURGICAL HISTORY:   Past Surgical History:   Procedure Laterality Date    ABDOMINAL SURGERY N/A     blockage of the bowels    COLONOSCOPY  08/23/2021    repeat 1 year    LAPAROTOMY, EXPLORATORY N/A 2/24/2023    Procedure: LAPAROTOMY, EXPLORATORY;  Surgeon: Kaleigh Beach MD;  Location: [facility], [place];  Service: General;  Laterality: N/A;  mesh removal    PROSTATECTOMY      UMBILICAL  HERNIA REPAIR      x 3           ALLERGIES:   Review of patient's allergies indicates:   Allergen Reactions    Opioids - morphine analogues         MEDICATIONS :  Patient reports he has had no Norco since February of last year so over so proximally a year ago  Current Outpatient Medications:     amLODIPine (NORVASC) 5 MG tablet, Take 1 tablet (5 mg total) by mouth 2 (two) times daily., Disp: 180 tablet, Rfl: 3    gemfibroziL (LOPID) 600 MG tablet, Take 1 tablet (600 mg total) by mouth once daily. With food., Disp: 90 tablet, Rfl: 3    HYDROcodone-acetaminophen (NORCO)  mg per tablet, Take 1 tablet by mouth every 6 (six) hours as needed for Pain., Disp: 120 tablet, Rfl: 0    HYDROcodone-acetaminophen (NORCO) 7.5-325 mg per tablet, Take 1 tablet by mouth every 6 (six) hours as needed for Pain., Disp: 15 tablet, Rfl: 0    lisinopriL (PRINIVIL,ZESTRIL) 20 MG tablet, Take 1 tablet (20 mg total) by mouth once daily., Disp: 90 tablet, Rfl: 3    omeprazole (PRILOSEC) 20 MG capsule, Take 1 capsule (20 mg total) by mouth once daily., Disp: 90 capsule, Rfl: 3    pravastatin (PRAVACHOL) 80 MG tablet, TAKE ONE TABLET BY MOUTH ONCE DAILY, Disp: 90 tablet, Rfl: 3    predniSONE (DELTASONE) 20 MG tablet, Take 2 tablets (40 mg total) by mouth once daily., Disp: 10 tablet, Rfl: 0    promethazine (PHENERGAN) 25 MG tablet, TAKE ONE TABLET BY MOUTH EVERY SIX HOURS AS NEEDED FOR NAUSEA, Disp: 25 tablet, Rfl: 1     SOCIAL HISTORY:   Social History     Socioeconomic History    Marital status:    Tobacco Use    Smoking status: Never    Smokeless tobacco: Never   Substance and Sexual Activity    Alcohol use: Yes     Comment: on occasion    Drug use: Never    Sexual activity: Not Currently     Social Determinants of Health     Financial Resource Strain: Low Risk  (2/23/2023)    Overall Financial Resource Strain (CARDIA)     Difficulty of Paying Living Expenses: Not hard at all   Food Insecurity: No Food Insecurity (2/23/2023)     Hunger Vital Sign     Worried About Running Out of Food in the Last Year: Never true     Ran Out of Food in the Last Year: Never true   Transportation Needs: No Transportation Needs (2023)    PRAPARE - Transportation     Lack of Transportation (Medical): No     Lack of Transportation (Non-Medical): No   Physical Activity: Inactive (2023)    Exercise Vital Sign     Days of Exercise per Week: 0 days     Minutes of Exercise per Session: 0 min   Stress: No Stress Concern Present (2023)    [employer] [employer] - Occupational Stress Questionnaire     Feeling of Stress : Not at all   Social Connections: Moderately Isolated (2023)    Social Connection and Isolation Panel [NHANES]     Frequency of Communication with Friends and Family: More than three times a week     Frequency of Social Gatherings with Friends and Family: Once a week     Attends [employer] Services: Never     Active Member of Clubs or Organizations: No     Attends Club or Organization Meetings: Never     Marital Status:    Housing Stability: Low Risk  (2023)    Housing Stability Vital Sign     Unable to Pay for Housing in the Last Year: No     Number of Places Lived in the Last Year: 1     Unstable Housing in the Last Year: No        ROS    FAMILY HISTORY:   Family History   Problem Relation Age of Onset    Breast cancer Mother          at age 49 from breast CA with mets          PHYSICAL EXAM: There were no vitals filed for this visit.            There is no height or weight on file to calculate BMI.     In general, this is a well-developed, well-nourished male . The patient is alert, oriented and cooperative.      HEENT:  Normocephalic, atraumatic.  Extraocular movements are intact bilaterally.  The oropharynx is benign.       NECK:  Nontender with good range of motion.      PULMONARY: Respirations are even and non-labored.       CARDIOVASCULAR: Pulses regular by peripheral palpation.       ABDOMEN:   Soft, non-tender, non-distended.        EXTREMITIES:  Right lower extremity  Motion is 2-120 degrees he has a 2+ dorsalis pedis pulse small effusion the skin looks good he is stable to varus and valgus stressing medial joint line tenderness he does have some pes planus on the right is a lot worse on the left    Ortho Exam        November 13, 2023  RADIOGRAPHIC FINDINGS:  Right knee standing x-rays with the sunrise view in addition total 4 films there is sclerotic changes with bone-on-bone medial compartment knee end-stage DJD no fracture dislocation appreciated       .      IMPRESSION:  Right knee end-stage DJD   Chronic narcotic usage will make postoperative pain control very difficult for him patient does report now he has not had any narcotics since February of last year so they should be a lot better    PLAN:  Right total knee replacement long rehab course discussed at length importance rehab discussed other risks benefits discussed        I had a long discussion with the patient about treatment options, including operative and nonoperative treatments. We discussed pros and cons of each including risks pertinent to surgery including pain, infection, bleeding, damage to adjacent structures like nerves and blood vessels, failure to heal, need for future surgeries, stiffness, instability, loss of limb, anesthesia risks like stroke, blood clot, loss of life. We discussed the possibility of need for later hardware removal in the case that hardware was used. We discussed common and uncommon risks, and discussed patient specific factors that may increase the risks present with surgery. All questions were answered. The patient expressed understanding of the pros and cons of surgery and wanted to proceed with surgical treatment.         No follow-ups on file.         Bill Avila III      (Subject to voice recognition error, transcription service not allowed)

## 2024-01-03 ENCOUNTER — OFFICE VISIT (OUTPATIENT)
Dept: PRIMARY CARE CLINIC | Facility: CLINIC | Age: 79
End: 2024-01-03
Payer: MEDICARE

## 2024-01-03 VITALS
SYSTOLIC BLOOD PRESSURE: 112 MMHG | TEMPERATURE: 98 F | HEIGHT: 72 IN | HEART RATE: 121 BPM | BODY MASS INDEX: 36.84 KG/M2 | RESPIRATION RATE: 18 BRPM | OXYGEN SATURATION: 96 % | DIASTOLIC BLOOD PRESSURE: 60 MMHG | WEIGHT: 272 LBS

## 2024-01-03 DIAGNOSIS — K21.9 GASTROESOPHAGEAL REFLUX DISEASE, UNSPECIFIED WHETHER ESOPHAGITIS PRESENT: ICD-10-CM

## 2024-01-03 DIAGNOSIS — D69.2 OTHER NONTHROMBOCYTOPENIC PURPURA: ICD-10-CM

## 2024-01-03 DIAGNOSIS — E66.01 MORBID OBESITY DUE TO EXCESS CALORIES: ICD-10-CM

## 2024-01-03 DIAGNOSIS — C61 CARCINOMA OF PROSTATE: ICD-10-CM

## 2024-01-03 DIAGNOSIS — I10 HYPERTENSION, UNSPECIFIED TYPE: ICD-10-CM

## 2024-01-03 DIAGNOSIS — K56.600 PARTIAL SMALL BOWEL OBSTRUCTION: ICD-10-CM

## 2024-01-03 DIAGNOSIS — M19.90 OSTEOARTHRITIS, UNSPECIFIED OSTEOARTHRITIS TYPE, UNSPECIFIED SITE: Primary | ICD-10-CM

## 2024-01-03 DIAGNOSIS — E11.9 TYPE 2 DIABETES MELLITUS WITHOUT COMPLICATION, WITHOUT LONG-TERM CURRENT USE OF INSULIN: ICD-10-CM

## 2024-01-03 PROCEDURE — 99214 OFFICE O/P EST MOD 30 MIN: CPT | Mod: ,,, | Performed by: FAMILY MEDICINE

## 2024-01-04 PROBLEM — C61 CARCINOMA OF PROSTATE: Status: ACTIVE | Noted: 2024-01-04

## 2024-01-04 NOTE — PROGRESS NOTES
Subjective:      Patient ID: Diogenes Aguilar is a 78 y.o. male.    Chief Complaint: surgery clearance (Right total knee replacement Dr Avila)    Diogenes Aguilar a 78 y.o. male presents for follow up on all regular problems which are reviewed and discussed.   Says ready for surgery. Getting over bad cold since 2 wks ago  Problem List Items Addressed This Visit          Cardiac/Vascular    Hypertension       Hematology    Other nonthrombocytopenic purpura       Oncology    Carcinoma of prostate       Endocrine    Type 2 diabetes mellitus without complication, without long-term current use of insulin    Morbid obesity due to excess calories       GI    Gastroesophageal reflux disease    Partial small bowel obstruction       Orthopedic    Osteoarthritis - Primary       Past Medical History:  Past Medical History:   Diagnosis Date    Carcinoma of prostate     Diverticula, colon 8/19/2021    Essential hypertension     GERD (gastroesophageal reflux disease)     History of colon polyps 8/19/2021    Hyperlipidemia     Hypothyroidism     Obesity     Polyp of ascending colon 8/19/2021     Past Surgical History:   Procedure Laterality Date    ABDOMINAL SURGERY N/A     blockage of the bowels    COLONOSCOPY  08/23/2021    repeat 1 year    LAPAROTOMY, EXPLORATORY N/A 2/24/2023    Procedure: LAPAROTOMY, EXPLORATORY;  Surgeon: Kaleigh Beach MD;  Location: [facility];  Service: General;  Laterality: N/A;  mesh removal    PROSTATECTOMY      UMBILICAL HERNIA REPAIR      x 3      Review of patient's allergies indicates:   Allergen Reactions    Opioids - morphine analogues      Current Outpatient Medications on File Prior to Visit   Medication Sig Dispense Refill    amLODIPine (NORVASC) 5 MG tablet Take 1 tablet (5 mg total) by mouth 2 (two) times daily. 180 tablet 3    gemfibroziL (LOPID) 600 MG tablet Take 1 tablet (600 mg total) by mouth once daily. With food. 90 tablet 3    lisinopriL (PRINIVIL,ZESTRIL) 20 MG tablet Take 1 tablet (20 mg  total) by mouth once daily. 90 tablet 3    omeprazole (PRILOSEC) 20 MG capsule Take 1 capsule (20 mg total) by mouth once daily. 90 capsule 3    pravastatin (PRAVACHOL) 80 MG tablet TAKE ONE TABLET BY MOUTH ONCE DAILY 90 tablet 3    HYDROcodone-acetaminophen (NORCO)  mg per tablet Take 1 tablet by mouth every 6 (six) hours as needed for Pain. (Patient not taking: Reported on 1/3/2024) 120 tablet 0    HYDROcodone-acetaminophen (NORCO) 7.5-325 mg per tablet Take 1 tablet by mouth every 6 (six) hours as needed for Pain. (Patient not taking: Reported on 1/3/2024) 15 tablet 0    predniSONE (DELTASONE) 20 MG tablet Take 2 tablets (40 mg total) by mouth once daily. (Patient not taking: Reported on 1/3/2024) 10 tablet 0    promethazine (PHENERGAN) 25 MG tablet TAKE ONE TABLET BY MOUTH EVERY SIX HOURS AS NEEDED FOR NAUSEA (Patient not taking: Reported on 1/3/2024) 25 tablet 1     No current facility-administered medications on file prior to visit.     Social History     Socioeconomic History    Marital status:    Tobacco Use    Smoking status: Never    Smokeless tobacco: Never   Substance and Sexual Activity    Alcohol use: Yes     Comment: on occasion    Drug use: Never    Sexual activity: Not Currently     Social Determinants of Health     Financial Resource Strain: Low Risk  (2/23/2023)    Overall Financial Resource Strain (CARDIA)     Difficulty of Paying Living Expenses: Not hard at all   Food Insecurity: No Food Insecurity (2/23/2023)    Hunger Vital Sign     Worried About Running Out of Food in the Last Year: Never true     Ran Out of Food in the Last Year: Never true   Transportation Needs: No Transportation Needs (2/23/2023)    PRAPARE - Transportation     Lack of Transportation (Medical): No     Lack of Transportation (Non-Medical): No   Physical Activity: Inactive (2/23/2023)    Exercise Vital Sign     Days of Exercise per Week: 0 days     Minutes of Exercise per Session: 0 min   Stress: No Stress  Concern Present (2023)    [employer] - Occupational Stress Questionnaire     Feeling of Stress : Not at all   Social Connections: Moderately Isolated (2023)    Social Connection and Isolation Panel [NHANES]     Frequency of Communication with Friends and Family: More than three times a week     Frequency of Social Gatherings with Friends and Family: Once a week     Attends Sabianism Services: Never     Active Member of Clubs or Organizations: No     Attends Club or Organization Meetings: Never     Marital Status:    Housing Stability: Low Risk  (2023)    Housing Stability Vital Sign     Unable to Pay for Housing in the Last Year: No     Number of Places Lived in the Last Year: 1     Unstable Housing in the Last Year: No     Family History   Problem Relation Age of Onset    Breast cancer Mother          at age 49 from breast CA with mets       Review of Systems   Constitutional: Negative.    HENT:  Negative for congestion, ear pain, nosebleeds and trouble swallowing.    Eyes:  Negative for pain and itching.   Respiratory:  Negative for chest tightness, shortness of breath, wheezing and stridor.    Cardiovascular:  Negative for chest pain.   Gastrointestinal:  Negative for abdominal distention.   Endocrine: Negative for cold intolerance and heat intolerance.   Genitourinary:  Negative for difficulty urinating.   Musculoskeletal:  Positive for arthralgias, gait problem, joint swelling and myalgias.   Neurological:  Negative for dizziness.       Objective:     /60 (BP Location: Left arm, Patient Position: Sitting, BP Method: Large (Manual))   Pulse (!) 121   Temp 98.2 °F (36.8 °C) (Oral)   Resp 18   Ht 6' (1.829 m)   Wt 123.4 kg (272 lb)   SpO2 96%   BMI 36.89 kg/m²     Physical Exam  Constitutional:       Appearance: Normal appearance. He is obese.   HENT:      Head: Normocephalic and atraumatic.      Right Ear: External ear normal.      Left Ear:  External ear normal.      Nose: Nose normal.      Mouth/Throat:      Mouth: Mucous membranes are moist.      Pharynx: Oropharynx is clear.   Eyes:      Pupils: Pupils are equal, round, and reactive to light.   Cardiovascular:      Rate and Rhythm: Normal rate and regular rhythm.      Heart sounds: Normal heart sounds. No murmur heard.     No gallop.   Pulmonary:      Effort: Pulmonary effort is normal. No respiratory distress.      Breath sounds: Normal breath sounds. No wheezing.   Abdominal:      Palpations: Abdomen is soft.   Musculoskeletal:         General: Swelling, tenderness and deformity present. Normal range of motion.      Cervical back: Normal range of motion and neck supple.      Left lower leg: Edema present.   Skin:     General: Skin is warm and dry.   Neurological:      General: No focal deficit present.      Mental Status: He is alert.   Psychiatric:         Mood and Affect: Mood normal.         Behavior: Behavior normal.         Thought Content: Thought content normal.         Judgment: Judgment normal.         1. Osteoarthritis, unspecified osteoarthritis type, unspecified site    2. Type 2 diabetes mellitus without complication, without long-term current use of insulin    3. Morbid obesity due to excess calories    4. Other nonthrombocytopenic purpura    5. Partial small bowel obstruction    6. Gastroesophageal reflux disease, unspecified whether esophagitis present    7. Hypertension, unspecified type    8. Carcinoma of prostate        Plan:     Problem List Items Addressed This Visit          Cardiac/Vascular    Hypertension       Hematology    Other nonthrombocytopenic purpura       Oncology    Carcinoma of prostate       Endocrine    Type 2 diabetes mellitus without complication, without long-term current use of insulin    Morbid obesity due to excess calories       GI    Gastroesophageal reflux disease    Partial small bowel obstruction       Orthopedic    Osteoarthritis - Primary     No  follow-ups on file.  Studies reviewed should do well  Pt cleared for surgery    I am having DIOGENES Aguilar maintain his promethazine, HYDROcodone-acetaminophen, amLODIPine, gemfibroziL, lisinopriL, omeprazole, pravastatin, HYDROcodone-acetaminophen, and predniSONE.    Diogenes was seen today for surgery clearance.    Diagnoses and all orders for this visit:    Osteoarthritis, unspecified osteoarthritis type, unspecified site    Type 2 diabetes mellitus without complication, without long-term current use of insulin    Morbid obesity due to excess calories    Other nonthrombocytopenic purpura    Partial small bowel obstruction    Gastroesophageal reflux disease, unspecified whether esophagitis present    Hypertension, unspecified type    Carcinoma of prostate         [unfilled]  No orders of the defined types were placed in this encounter.

## 2024-01-05 NOTE — PLAN OF CARE
Sw spoke with pt on this day to complete dcp for RTKR scheduled for 01/08/2024. Pt lives home with spouse Daphne Aguilar [phone]. Pt has rw and raised toilet seat for home use. Pt requests cryocuff. Adrian to notify the medical store at this time. Home health choice obtained for [facility] health. Referral sending at this time.

## 2024-01-08 ENCOUNTER — ANESTHESIA (OUTPATIENT)
Dept: SURGERY | Facility: HOSPITAL | Age: 79
End: 2024-01-08
Payer: MEDICARE

## 2024-01-08 ENCOUNTER — HOSPITAL ENCOUNTER (OUTPATIENT)
Facility: HOSPITAL | Age: 79
Discharge: HOME HEALTH | End: 2024-01-09
Attending: ORTHOPAEDIC SURGERY | Admitting: ORTHOPAEDIC SURGERY
Payer: MEDICARE

## 2024-01-08 ENCOUNTER — ANESTHESIA EVENT (OUTPATIENT)
Dept: SURGERY | Facility: HOSPITAL | Age: 79
End: 2024-01-08
Payer: MEDICARE

## 2024-01-08 DIAGNOSIS — M17.10 PRIMARY OSTEOARTHRITIS OF KNEE, UNSPECIFIED LATERALITY: ICD-10-CM

## 2024-01-08 DIAGNOSIS — M17.9 DJD (DEGENERATIVE JOINT DISEASE) OF KNEE: Primary | ICD-10-CM

## 2024-01-08 PROBLEM — E78.5 HYPERLIPIDEMIA: Status: ACTIVE | Noted: 2024-01-08

## 2024-01-08 LAB
ANION GAP SERPL CALCULATED.3IONS-SCNC: 9 MMOL/L (ref 7–16)
BASOPHILS # BLD AUTO: 0.04 K/UL (ref 0–0.2)
BASOPHILS NFR BLD AUTO: 0.7 % (ref 0–1)
BUN SERPL-MCNC: 20 MG/DL (ref 7–18)
BUN/CREAT SERPL: 19 (ref 6–20)
CALCIUM SERPL-MCNC: 8.5 MG/DL (ref 8.5–10.1)
CHLORIDE SERPL-SCNC: 109 MMOL/L (ref 98–107)
CO2 SERPL-SCNC: 25 MMOL/L (ref 21–32)
CREAT SERPL-MCNC: 1.05 MG/DL (ref 0.7–1.3)
DIFFERENTIAL METHOD BLD: ABNORMAL
EGFR (NO RACE VARIABLE) (RUSH/TITUS): 74 ML/MIN/1.73M2
EOSINOPHIL # BLD AUTO: 0.03 K/UL (ref 0–0.5)
EOSINOPHIL NFR BLD AUTO: 0.5 % (ref 1–4)
ERYTHROCYTE [DISTWIDTH] IN BLOOD BY AUTOMATED COUNT: 13.7 % (ref 11.5–14.5)
GLUCOSE SERPL-MCNC: 78 MG/DL (ref 70–105)
GLUCOSE SERPL-MCNC: 93 MG/DL (ref 74–106)
HCT VFR BLD AUTO: 42.6 % (ref 40–54)
HGB BLD-MCNC: 13.9 G/DL (ref 13.5–18)
IMM GRANULOCYTES # BLD AUTO: 0.03 K/UL (ref 0–0.04)
IMM GRANULOCYTES NFR BLD: 0.5 % (ref 0–0.4)
LYMPHOCYTES # BLD AUTO: 1.21 K/UL (ref 1–4.8)
LYMPHOCYTES NFR BLD AUTO: 20.8 % (ref 27–41)
MCH RBC QN AUTO: 29.5 PG (ref 27–31)
MCHC RBC AUTO-ENTMCNC: 32.6 G/DL (ref 32–36)
MCV RBC AUTO: 90.4 FL (ref 80–96)
MONOCYTES # BLD AUTO: 0.25 K/UL (ref 0–0.8)
MONOCYTES NFR BLD AUTO: 4.3 % (ref 2–6)
MPC BLD CALC-MCNC: 8.6 FL (ref 9.4–12.4)
NEUTROPHILS # BLD AUTO: 4.25 K/UL (ref 1.8–7.7)
NEUTROPHILS NFR BLD AUTO: 73.2 % (ref 53–65)
NRBC # BLD AUTO: 0 X10E3/UL
NRBC, AUTO (.00): 0 %
PLATELET # BLD AUTO: 298 K/UL (ref 150–400)
POTASSIUM SERPL-SCNC: 5.1 MMOL/L (ref 3.5–5.1)
RBC # BLD AUTO: 4.71 M/UL (ref 4.6–6.2)
SODIUM SERPL-SCNC: 138 MMOL/L (ref 136–145)
WBC # BLD AUTO: 5.81 K/UL (ref 4.5–11)

## 2024-01-08 PROCEDURE — 0055T BONE SRGRY CMPTR CT/MRI IMAG: CPT | Mod: ,,, | Performed by: ORTHOPAEDIC SURGERY

## 2024-01-08 PROCEDURE — 71000016 HC POSTOP RECOV ADDL HR: Performed by: ORTHOPAEDIC SURGERY

## 2024-01-08 PROCEDURE — 27200750 HC INSULATED NEEDLE/ STIMUPLEX: Performed by: NURSE ANESTHETIST, CERTIFIED REGISTERED

## 2024-01-08 PROCEDURE — 71000015 HC POSTOP RECOV 1ST HR: Performed by: ORTHOPAEDIC SURGERY

## 2024-01-08 PROCEDURE — 63600175 PHARM REV CODE 636 W HCPCS: Performed by: ORTHOPAEDIC SURGERY

## 2024-01-08 PROCEDURE — 25000003 PHARM REV CODE 250: Performed by: ORTHOPAEDIC SURGERY

## 2024-01-08 PROCEDURE — 27201423 OPTIME MED/SURG SUP & DEVICES STERILE SUPPLY: Performed by: ORTHOPAEDIC SURGERY

## 2024-01-08 PROCEDURE — 27447 TOTAL KNEE ARTHROPLASTY: CPT | Mod: RT,,, | Performed by: ORTHOPAEDIC SURGERY

## 2024-01-08 PROCEDURE — 80048 BASIC METABOLIC PNL TOTAL CA: CPT | Performed by: ORTHOPAEDIC SURGERY

## 2024-01-08 PROCEDURE — D9220A PRA ANESTHESIA: Mod: CRNA,,, | Performed by: NURSE ANESTHETIST, CERTIFIED REGISTERED

## 2024-01-08 PROCEDURE — 63600175 PHARM REV CODE 636 W HCPCS: Performed by: NURSE ANESTHETIST, CERTIFIED REGISTERED

## 2024-01-08 PROCEDURE — 99900035 HC TECH TIME PER 15 MIN (STAT)

## 2024-01-08 PROCEDURE — 82962 GLUCOSE BLOOD TEST: CPT

## 2024-01-08 PROCEDURE — 37000008 HC ANESTHESIA 1ST 15 MINUTES: Performed by: ORTHOPAEDIC SURGERY

## 2024-01-08 PROCEDURE — 99214 OFFICE O/P EST MOD 30 MIN: CPT | Mod: ,,, | Performed by: INTERNAL MEDICINE

## 2024-01-08 PROCEDURE — 97166 OT EVAL MOD COMPLEX 45 MIN: CPT

## 2024-01-08 PROCEDURE — 37000009 HC ANESTHESIA EA ADD 15 MINS: Performed by: ORTHOPAEDIC SURGERY

## 2024-01-08 PROCEDURE — 25000003 PHARM REV CODE 250: Performed by: INTERNAL MEDICINE

## 2024-01-08 PROCEDURE — C1713 ANCHOR/SCREW BN/BN,TIS/BN: HCPCS | Performed by: ORTHOPAEDIC SURGERY

## 2024-01-08 PROCEDURE — 94761 N-INVAS EAR/PLS OXIMETRY MLT: CPT

## 2024-01-08 PROCEDURE — 25000003 PHARM REV CODE 250: Performed by: NURSE ANESTHETIST, CERTIFIED REGISTERED

## 2024-01-08 PROCEDURE — C1776 JOINT DEVICE (IMPLANTABLE): HCPCS | Performed by: ORTHOPAEDIC SURGERY

## 2024-01-08 PROCEDURE — 27000177 HC AIRWAY, LARYNGEAL MASK: Performed by: NURSE ANESTHETIST, CERTIFIED REGISTERED

## 2024-01-08 PROCEDURE — 36000713 HC OR TIME LEV V EA ADD 15 MIN: Performed by: ORTHOPAEDIC SURGERY

## 2024-01-08 PROCEDURE — 71000033 HC RECOVERY, INTIAL HOUR: Performed by: ORTHOPAEDIC SURGERY

## 2024-01-08 PROCEDURE — 36000712 HC OR TIME LEV V 1ST 15 MIN: Performed by: ORTHOPAEDIC SURGERY

## 2024-01-08 PROCEDURE — 25000003 PHARM REV CODE 250

## 2024-01-08 PROCEDURE — 97161 PT EVAL LOW COMPLEX 20 MIN: CPT

## 2024-01-08 PROCEDURE — D9220A PRA ANESTHESIA: Mod: ANES,,, | Performed by: ANESTHESIOLOGY

## 2024-01-08 PROCEDURE — 27000716 HC OXISENSOR PROBE, ANY SIZE: Performed by: NURSE ANESTHETIST, CERTIFIED REGISTERED

## 2024-01-08 PROCEDURE — 85025 COMPLETE CBC W/AUTO DIFF WBC: CPT | Performed by: ORTHOPAEDIC SURGERY

## 2024-01-08 PROCEDURE — 27000510 HC BLANKET BAIR HUGGER ANY SIZE: Performed by: NURSE ANESTHETIST, CERTIFIED REGISTERED

## 2024-01-08 PROCEDURE — C1769 GUIDE WIRE: HCPCS | Performed by: ORTHOPAEDIC SURGERY

## 2024-01-08 PROCEDURE — 25000003 PHARM REV CODE 250: Performed by: ANESTHESIOLOGY

## 2024-01-08 PROCEDURE — 27201960 HC SPINAL TRAY: Performed by: NURSE ANESTHETIST, CERTIFIED REGISTERED

## 2024-01-08 DEVICE — ATTUNE PATELLA MEDIALIZED DOME 41MM CEMENTED AOX
Type: IMPLANTABLE DEVICE | Site: KNEE | Status: FUNCTIONAL
Brand: ATTUNE

## 2024-01-08 DEVICE — ATTUNE KNEE SYSTEM FEMORAL POSTERIOR STABILIZED SIZE 7 RIGHT CEMENTED
Type: IMPLANTABLE DEVICE | Site: KNEE | Status: FUNCTIONAL
Brand: ATTUNE

## 2024-01-08 DEVICE — CEMENT BONE SURG SMPLX P RADPQ: Type: IMPLANTABLE DEVICE | Site: KNEE | Status: FUNCTIONAL

## 2024-01-08 RX ORDER — ONDANSETRON 2 MG/ML
INJECTION INTRAMUSCULAR; INTRAVENOUS
Status: DISCONTINUED | OUTPATIENT
Start: 2024-01-08 | End: 2024-01-08

## 2024-01-08 RX ORDER — DIPHENHYDRAMINE HYDROCHLORIDE 50 MG/ML
25 INJECTION, SOLUTION INTRAMUSCULAR; INTRAVENOUS EVERY 6 HOURS PRN
Status: DISCONTINUED | OUTPATIENT
Start: 2024-01-08 | End: 2024-01-08 | Stop reason: HOSPADM

## 2024-01-08 RX ORDER — LIDOCAINE HYDROCHLORIDE 20 MG/ML
INJECTION, SOLUTION EPIDURAL; INFILTRATION; INTRACAUDAL; PERINEURAL
Status: DISCONTINUED | OUTPATIENT
Start: 2024-01-08 | End: 2024-01-08

## 2024-01-08 RX ORDER — BUPIVACAINE HYDROCHLORIDE 5 MG/ML
INJECTION, SOLUTION EPIDURAL; INTRACAUDAL
Status: DISCONTINUED | OUTPATIENT
Start: 2024-01-08 | End: 2024-01-08 | Stop reason: HOSPADM

## 2024-01-08 RX ORDER — TOBRAMYCIN 1.2 G/30ML
INJECTION, POWDER, LYOPHILIZED, FOR SOLUTION INTRAVENOUS
Status: DISCONTINUED | OUTPATIENT
Start: 2024-01-08 | End: 2024-01-08 | Stop reason: HOSPADM

## 2024-01-08 RX ORDER — CEFAZOLIN SODIUM 1 G/3ML
INJECTION, POWDER, FOR SOLUTION INTRAMUSCULAR; INTRAVENOUS
Status: DISCONTINUED | OUTPATIENT
Start: 2024-01-08 | End: 2024-01-08

## 2024-01-08 RX ORDER — PRAVASTATIN SODIUM 40 MG/1
80 TABLET ORAL DAILY
Status: DISCONTINUED | OUTPATIENT
Start: 2024-01-08 | End: 2024-01-09 | Stop reason: HOSPADM

## 2024-01-08 RX ORDER — ONDANSETRON 4 MG/1
4 TABLET, FILM COATED ORAL ONCE
Status: COMPLETED | OUTPATIENT
Start: 2024-01-08 | End: 2024-01-08

## 2024-01-08 RX ORDER — MIDAZOLAM HYDROCHLORIDE 1 MG/ML
INJECTION INTRAMUSCULAR; INTRAVENOUS
Status: DISCONTINUED | OUTPATIENT
Start: 2024-01-08 | End: 2024-01-08

## 2024-01-08 RX ORDER — MORPHINE SULFATE 1 MG/ML
INJECTION, SOLUTION EPIDURAL; INTRATHECAL; INTRAVENOUS
Status: DISCONTINUED | OUTPATIENT
Start: 2024-01-08 | End: 2024-01-08 | Stop reason: HOSPADM

## 2024-01-08 RX ORDER — SODIUM CHLORIDE, SODIUM LACTATE, POTASSIUM CHLORIDE, CALCIUM CHLORIDE 600; 310; 30; 20 MG/100ML; MG/100ML; MG/100ML; MG/100ML
INJECTION, SOLUTION INTRAVENOUS CONTINUOUS
Status: DISCONTINUED | OUTPATIENT
Start: 2024-01-08 | End: 2024-01-08

## 2024-01-08 RX ORDER — PRAVASTATIN SODIUM 40 MG/1
80 TABLET ORAL DAILY
Status: DISCONTINUED | OUTPATIENT
Start: 2024-01-09 | End: 2024-01-08

## 2024-01-08 RX ORDER — ONDANSETRON 2 MG/ML
4 INJECTION INTRAMUSCULAR; INTRAVENOUS DAILY PRN
Status: DISCONTINUED | OUTPATIENT
Start: 2024-01-08 | End: 2024-01-08 | Stop reason: HOSPADM

## 2024-01-08 RX ORDER — IPRATROPIUM BROMIDE AND ALBUTEROL SULFATE 2.5; .5 MG/3ML; MG/3ML
3 SOLUTION RESPIRATORY (INHALATION) ONCE
Status: DISCONTINUED | OUTPATIENT
Start: 2024-01-08 | End: 2024-01-08 | Stop reason: HOSPADM

## 2024-01-08 RX ORDER — CHOLECALCIFEROL (VITAMIN D3) 25 MCG
2500 TABLET ORAL DAILY
COMMUNITY

## 2024-01-08 RX ORDER — PHENYLEPHRINE HYDROCHLORIDE 10 MG/ML
INJECTION INTRAVENOUS
Status: DISCONTINUED | OUTPATIENT
Start: 2024-01-08 | End: 2024-01-08

## 2024-01-08 RX ORDER — ONDANSETRON 4 MG/1
4 TABLET, FILM COATED ORAL ONCE
Status: DISCONTINUED | OUTPATIENT
Start: 2024-01-08 | End: 2024-01-08

## 2024-01-08 RX ORDER — DIMENHYDRINATE 50 MG
50 TABLET ORAL ONCE
Status: COMPLETED | OUTPATIENT
Start: 2024-01-08 | End: 2024-01-08

## 2024-01-08 RX ORDER — MORPHINE SULFATE 10 MG/ML
4 INJECTION INTRAMUSCULAR; INTRAVENOUS; SUBCUTANEOUS EVERY 5 MIN PRN
Status: DISCONTINUED | OUTPATIENT
Start: 2024-01-08 | End: 2024-01-08 | Stop reason: HOSPADM

## 2024-01-08 RX ORDER — DIPHENHYDRAMINE HYDROCHLORIDE 50 MG/ML
INJECTION, SOLUTION INTRAMUSCULAR; INTRAVENOUS
Status: DISCONTINUED | OUTPATIENT
Start: 2024-01-08 | End: 2024-01-08

## 2024-01-08 RX ORDER — MELOXICAM 7.5 MG/1
7.5 TABLET ORAL DAILY
Qty: 30 TABLET | Refills: 1 | Status: SHIPPED | OUTPATIENT
Start: 2024-01-08

## 2024-01-08 RX ORDER — HYDROMORPHONE HYDROCHLORIDE 2 MG/ML
0.5 INJECTION, SOLUTION INTRAMUSCULAR; INTRAVENOUS; SUBCUTANEOUS EVERY 5 MIN PRN
Status: DISCONTINUED | OUTPATIENT
Start: 2024-01-08 | End: 2024-01-08 | Stop reason: HOSPADM

## 2024-01-08 RX ORDER — SODIUM CHLORIDE 9 MG/ML
INJECTION, SOLUTION INTRAVENOUS CONTINUOUS
Status: DISCONTINUED | OUTPATIENT
Start: 2024-01-08 | End: 2024-01-09

## 2024-01-08 RX ORDER — FACIAL-BODY WIPES
10 EACH TOPICAL DAILY PRN
Status: DISCONTINUED | OUTPATIENT
Start: 2024-01-08 | End: 2024-01-09 | Stop reason: HOSPADM

## 2024-01-08 RX ORDER — SODIUM CHLORIDE 9 MG/ML
INJECTION, SOLUTION INTRAVENOUS CONTINUOUS
Status: DISCONTINUED | OUTPATIENT
Start: 2024-01-08 | End: 2024-01-08

## 2024-01-08 RX ORDER — MEPERIDINE HYDROCHLORIDE 25 MG/ML
25 INJECTION INTRAMUSCULAR; INTRAVENOUS; SUBCUTANEOUS EVERY 10 MIN PRN
Status: DISCONTINUED | OUTPATIENT
Start: 2024-01-08 | End: 2024-01-08 | Stop reason: HOSPADM

## 2024-01-08 RX ORDER — TRANEXAMIC ACID 100 MG/ML
INJECTION, SOLUTION INTRAVENOUS
Status: DISCONTINUED | OUTPATIENT
Start: 2024-01-08 | End: 2024-01-08

## 2024-01-08 RX ORDER — METAXALONE 800 MG/1
800 TABLET ORAL 3 TIMES DAILY PRN
Qty: 20 TABLET | Refills: 0 | Status: SHIPPED | OUTPATIENT
Start: 2024-01-08 | End: 2024-01-18

## 2024-01-08 RX ORDER — MORPHINE SULFATE 10 MG/ML
4 INJECTION INTRAMUSCULAR; INTRAVENOUS; SUBCUTANEOUS EVERY 4 HOURS PRN
Status: DISCONTINUED | OUTPATIENT
Start: 2024-01-08 | End: 2024-01-09 | Stop reason: HOSPADM

## 2024-01-08 RX ORDER — DIMENHYDRINATE 50 MG
50 TABLET ORAL ONCE
Status: DISCONTINUED | OUTPATIENT
Start: 2024-01-08 | End: 2024-01-08

## 2024-01-08 RX ORDER — HYDRALAZINE HYDROCHLORIDE 20 MG/ML
5 INJECTION INTRAMUSCULAR; INTRAVENOUS EVERY 6 HOURS PRN
Status: DISCONTINUED | OUTPATIENT
Start: 2024-01-08 | End: 2024-01-09 | Stop reason: HOSPADM

## 2024-01-08 RX ORDER — PROPOFOL 10 MG/ML
VIAL (ML) INTRAVENOUS
Status: DISCONTINUED | OUTPATIENT
Start: 2024-01-08 | End: 2024-01-08

## 2024-01-08 RX ORDER — OXYCODONE AND ACETAMINOPHEN 7.5; 325 MG/1; MG/1
1 TABLET ORAL EVERY 4 HOURS PRN
Qty: 20 TABLET | Refills: 0 | Status: SHIPPED | OUTPATIENT
Start: 2024-01-08

## 2024-01-08 RX ORDER — ONDANSETRON 2 MG/ML
4 INJECTION INTRAMUSCULAR; INTRAVENOUS EVERY 8 HOURS PRN
Status: DISCONTINUED | OUTPATIENT
Start: 2024-01-08 | End: 2024-01-09 | Stop reason: HOSPADM

## 2024-01-08 RX ORDER — PANTOPRAZOLE SODIUM 40 MG/1
40 TABLET, DELAYED RELEASE ORAL DAILY
Status: DISCONTINUED | OUTPATIENT
Start: 2024-01-09 | End: 2024-01-09 | Stop reason: HOSPADM

## 2024-01-08 RX ORDER — OXYCODONE AND ACETAMINOPHEN 10; 325 MG/1; MG/1
1 TABLET ORAL EVERY 4 HOURS PRN
Status: DISCONTINUED | OUTPATIENT
Start: 2024-01-08 | End: 2024-01-09 | Stop reason: HOSPADM

## 2024-01-08 RX ORDER — GEMFIBROZIL 600 MG/1
600 TABLET, FILM COATED ORAL DAILY
Status: DISCONTINUED | OUTPATIENT
Start: 2024-01-09 | End: 2024-01-09 | Stop reason: HOSPADM

## 2024-01-08 RX ORDER — GUAIFENESIN 100 MG/5ML
81 LIQUID (ML) ORAL 2 TIMES DAILY
Status: DISCONTINUED | OUTPATIENT
Start: 2024-01-09 | End: 2024-01-09 | Stop reason: HOSPADM

## 2024-01-08 RX ADMIN — Medication 100 MG: at 08:01

## 2024-01-08 RX ADMIN — PHENYLEPHRINE HYDROCHLORIDE 200 MCG: 10 INJECTION INTRAVENOUS at 09:01

## 2024-01-08 RX ADMIN — Medication 50 MG: at 08:01

## 2024-01-08 RX ADMIN — VANCOMYCIN HYDROCHLORIDE 2000 MG: 1 INJECTION, POWDER, LYOPHILIZED, FOR SOLUTION INTRAVENOUS at 08:01

## 2024-01-08 RX ADMIN — TRANEXAMIC ACID 1000 MG: 100 INJECTION, SOLUTION INTRAVENOUS at 10:01

## 2024-01-08 RX ADMIN — PHENYLEPHRINE HYDROCHLORIDE 200 MCG: 10 INJECTION INTRAVENOUS at 10:01

## 2024-01-08 RX ADMIN — LIDOCAINE HYDROCHLORIDE 50 MG: 20 INJECTION, SOLUTION INTRAVENOUS at 08:01

## 2024-01-08 RX ADMIN — SODIUM CHLORIDE: 9 INJECTION, SOLUTION INTRAVENOUS at 11:01

## 2024-01-08 RX ADMIN — VANCOMYCIN HYDROCHLORIDE 1500 MG: 500 INJECTION, POWDER, LYOPHILIZED, FOR SOLUTION INTRAVENOUS at 09:01

## 2024-01-08 RX ADMIN — PRAVASTATIN SODIUM 80 MG: 40 TABLET ORAL at 09:01

## 2024-01-08 RX ADMIN — TRANEXAMIC ACID 1000 MG: 100 INJECTION, SOLUTION INTRAVENOUS at 08:01

## 2024-01-08 RX ADMIN — OXYCODONE HYDROCHLORIDE AND ACETAMINOPHEN 1 TABLET: 10; 325 TABLET ORAL at 01:01

## 2024-01-08 RX ADMIN — DIMENHYDRINATE 50 MG: 50 TABLET ORAL at 06:01

## 2024-01-08 RX ADMIN — OXYCODONE HYDROCHLORIDE AND ACETAMINOPHEN 1 TABLET: 10; 325 TABLET ORAL at 11:01

## 2024-01-08 RX ADMIN — ONDANSETRON HYDROCHLORIDE 4 MG: 4 TABLET, FILM COATED ORAL at 06:01

## 2024-01-08 RX ADMIN — ONDANSETRON 4 MG: 2 INJECTION INTRAMUSCULAR; INTRAVENOUS at 08:01

## 2024-01-08 RX ADMIN — PHENYLEPHRINE HYDROCHLORIDE 200 MCG: 10 INJECTION INTRAVENOUS at 08:01

## 2024-01-08 RX ADMIN — CEFAZOLIN 2 G: 2 INJECTION, POWDER, FOR SOLUTION INTRAMUSCULAR; INTRAVENOUS at 06:01

## 2024-01-08 RX ADMIN — CEFAZOLIN 3 G: 1 INJECTION, POWDER, FOR SOLUTION INTRAMUSCULAR; INTRAVENOUS; PARENTERAL at 08:01

## 2024-01-08 RX ADMIN — CEFAZOLIN 2 G: 2 INJECTION, POWDER, FOR SOLUTION INTRAMUSCULAR; INTRAVENOUS at 11:01

## 2024-01-08 RX ADMIN — MIDAZOLAM HYDROCHLORIDE 2 MG: 1 INJECTION, SOLUTION INTRAMUSCULAR; INTRAVENOUS at 08:01

## 2024-01-08 RX ADMIN — DIPHENHYDRAMINE HYDROCHLORIDE 50 MG: 50 INJECTION, SOLUTION INTRAMUSCULAR; INTRAVENOUS at 08:01

## 2024-01-08 RX ADMIN — SODIUM CHLORIDE: 9 INJECTION, SOLUTION INTRAVENOUS at 06:01

## 2024-01-08 RX ADMIN — OXYCODONE HYDROCHLORIDE AND ACETAMINOPHEN 1 TABLET: 10; 325 TABLET ORAL at 06:01

## 2024-01-08 NOTE — ASSESSMENT & PLAN NOTE
- Blood pressure is borderline after the surgery  - Holding home blood pressure medications  - Monitor blood pressure and resumed home blood pressure medications as indicated

## 2024-01-08 NOTE — PROGRESS NOTES
1119 RECEIVED TO RR EASILY AROUSED, ORAL AIRWAY REMOVED, SUCTION CLEAR SECRETIONS FROM ORAL CAVITY. HOB ELEVATED. COLOR PINK. O2 APPLIED VIA NC. IV INFUSING WELL LEFT HAND 20G. CATH. CHRISTIAN CATH P/D YELLOW URINE. DRESSING RIGHT KNEE D/I. HEMOVAC DRAIN RIGHT KNEE COMPRESSED PD/ SCANT RED COLORED URINE. IMMOBILIZER TO RIGHT KNEE. AUDIBLE DOPPLER PEDAL PULSE RIGHT FOOT STRONG. FOOT COOL. TIMA, VERBALIZES SENSATION DOWN TO FEET. NO C/O PAIN OR DISCOMFORT. SEE FLOW SHEET. BLOOD SUGAR 78.    1145 LAB AND X-RAYS DONE PER TERENCE DIMAS. WELL.    1155 AWAKE,ALERT. NO C/O PAIN. NO DISTRESS NOTED. TRANSFERRED TO ROOM.

## 2024-01-08 NOTE — PLAN OF CARE
[facility] - Orthopedic Periop Services  Initial Discharge Assessment       Primary Care Provider: Timothy Solomon III, DO    Admission Diagnosis: Primary osteoarthritis of right knee [M17.11]  DJD (degenerative joint disease) of knee [M17.9]    Admission Date: 1/8/2024  Expected Discharge Date:     Transition of Care Barriers: None    Payor: MEDICARE / Plan: MEDICARE PART A & B / Product Type: Government /     Extended Emergency Contact Information  Primary Emergency Contact: SONIA AGUILAR  Mobile Phone: [phone]  Relation: Son  Preferred language: English   needed? No  Secondary Emergency Contact: Daphne Aguilar  Mobile Phone: [phone]  Relation: Spouse    Discharge Plan A: Home with family, Home Health  Discharge Plan B: Home with family, Home Health      [facility] [address]  [address]  [facility] [address]  Phone: [phone] Fax: [phone]    The Pharmacy at [address] [address]  Phone: [phone] Fax: [phone]      Initial Assessment (most recent)       Adult Discharge Assessment - 01/08/24 0846          Discharge Assessment    Assessment Type Discharge Planning Assessment     Source of Information family     If unable to respond/provide information was family/caregiver contacted? Yes     Contact Name/Number Daphne Aguilar (Spouse) [phone]     Communicated AYDEN with patient/caregiver Yes     People in Home spouse     Facility Arrived From: home     Do you expect to return to your current living situation? Yes     Do you have help at home or someone to help you manage your care at home? Yes     Who are your caregiver(s) and their phone number(s)? Daphne Aguilar (Spouse) [phone]     Prior to hospitilization cognitive status: Unable to Assess     Current cognitive status: Coma/Sedated/Intubated     Walking or Climbing Stairs Difficulty no     Dressing/Bathing Difficulty no      Home Accessibility wheelchair accessible     Home Layout Able to live on 1st floor     Equipment Currently Used at Home raised toilet;walker, rolling     Readmission within 30 days? No     Patient currently being followed by outpatient case management? No     Do you currently have service(s) that help you manage your care at home? No     Do you take prescription medications? Yes     Do you have prescription coverage? Yes     Do you have any problems affording any of your prescribed medications? No     Is the patient taking medications as prescribed? yes     How do you get to doctors appointments? car, drives self;family or friend will provide     Are you on dialysis? No     Do you take coumadin? No     Discharge Plan A Home with family;Home Health     Discharge Plan B Home with family;Home Health     DME Needed Upon Discharge  other (see comments)   cryocuff    Discharge Plan discussed with: Spouse/sig other     Name(s) and Number(s) Daphne Aguilar (Spouse) [phone]     Transition of Care Barriers None        Physical Activity    On average, how many days per week do you engage in moderate to strenuous exercise (like a brisk walk)? 0 days     On average, how many minutes do you engage in exercise at this level? 0 min        Financial Resource Strain    How hard is it for you to pay for the very basics like food, housing, medical care, and heating? Not hard at all        Housing Stability    In the last 12 months, was there a time when you were not able to pay the mortgage or rent on time? No     In the last 12 months, how many places have you lived? 1     In the last 12 months, was there a time when you did not have a steady place to sleep or slept in a shelter (including now)? No        Transportation Needs    In the past 12 months, has lack of transportation kept you from medical appointments or from getting medications? No     In the past 12 months, has lack of transportation kept you from meetings, work, or  from getting things needed for daily living? No        Food Insecurity    Within the past 12 months, you worried that your food would run out before you got the money to buy more. Never true     Within the past 12 months, the food you bought just didn't last and you didn't have money to get more. Never true        Stress    Do you feel stress - tense, restless, nervous, or anxious, or unable to sleep at night because your mind is troubled all the time - these days? Not at all        Social Connections    In a typical week, how many times do you talk on the phone with family, friends, or neighbors? Three times a week     How often do you get together with friends or relatives? Three times a week     How often do you attend Christianity or Samaritan services? Never     Do you belong to any clubs or organizations such as Christianity groups, unions, fraternal or athletic groups, or school groups? No     How often do you attend meetings of the clubs or organizations you belong to? Never     Are you , , , , never , or living with a partner?         Alcohol Use    Q1: How often do you have a drink containing alcohol? Monthly or less     Q2: How many drinks containing alcohol do you have on a typical day when you are drinking? 1 or 2     Q3: How often do you have six or more drinks on one occasion? Never        OTHER    Name(s) of People in Home Daphne Aguilar (Spouse) [phone]                   Sw spoke with Daphne Aguilar (Spouse) [phone]  on this day to complete dcp initial assessment. Pta, pt lived home with spouse. No hh, has rw and raised toilet seat for home use. Cryocuff to be delivered to bedside by [facility] on today. Choice previously obtained for [facility] health. Referrals sent. Ss following for dc needs and recommendations.

## 2024-01-08 NOTE — ASSESSMENT & PLAN NOTE
- S/P right knee replacement, 1/8  - Continue Cefazolin and Vancomycin as per orthopedic surgery  - Continue IV hydration  - Diludid 0.5 mg and morphine as scheduled by primary team

## 2024-01-08 NOTE — ANESTHESIA PROCEDURE NOTES
Intubation    Date/Time: 1/8/2024 8:41 AM    Performed by: Suman Moya CRNA  Authorized by: Suman Moya CRNA    Intubation:     Induction:  Intravenous    Intubated:  Postinduction    Mask Ventilation:  Easy mask    Attempts:  1    Attempted By:  CRNA    Difficult Airway Encountered?: No      Complications:  None    Airway Device:  Supraglottic airway/LMA    Airway Device Size:  5.0    Style/Cuff Inflation:  Cuffed (inflated to minimal occlusive pressure)    Placement Verified By:  Capnometry    Complicating Factors:  None    Findings Post-Intubation:  BS equal bilateral

## 2024-01-08 NOTE — ANESTHESIA PREPROCEDURE EVALUATION
01/08/2024  Diogenes Aguilar is a 78 y.o., male.      Pre-op Assessment    I have reviewed the Patient Summary Reports.     I have reviewed the Nursing Notes. I have reviewed the NPO Status.   I have reviewed the Medications.     Review of Systems  Anesthesia Hx:             Denies Family Hx of Anesthesia complications.    Denies Personal Hx of Anesthesia complications.                    Social:  Non-Smoker, No Alcohol Use       Hematology/Oncology:  Hematology Normal   Oncology Normal                                   EENT/Dental:  EENT/Dental Normal           Cardiovascular:  Exercise tolerance: good                                     Hypertension         Pulmonary:  Pulmonary Normal                       Renal/:  Renal/ Normal                 Hepatic/GI:     Gerd    Hernia, Hiatal Hernia      Musculoskeletal:       Arthritis          Neurological:  Neurology Normal              Arthritis                         Neuromuscular Disease   Endocrine:   Diabetes                      Dermatological:  Skin Normal    Psych:  Psychiatric Normal                    Physical Exam  General: Well nourished, Cooperative, Alert and Oriented    Airway:  Mallampati: II / II  Mouth Opening: Normal  TM Distance: Normal  Neck ROM: Normal ROM    Dental:  Intact    Chest/Lungs:  Clear to auscultation    Heart:  Rate: Normal  Rhythm: Regular Rhythm  Sounds: Normal        Chemistry        Component Value Date/Time     01/02/2024 1211    K 4.0 01/02/2024 1211     01/02/2024 1211    CO2 27 01/02/2024 1211    BUN 15 01/02/2024 1211    CREATININE 1.00 01/02/2024 1211    GLU 89 01/02/2024 1211        Component Value Date/Time    CALCIUM 9.7 01/02/2024 1211    ALKPHOS 97 01/02/2024 1211    AST 16 01/02/2024 1211    ALT 14 (L) 01/02/2024 1211    BILITOT 0.5 01/02/2024 1211    EGFRNONAA 84 07/11/2022 0716        Lab Results    Component Value Date    WBC 7.43 01/02/2024    HGB 16.0 01/02/2024    HCT 48.6 01/02/2024     01/02/2024     Results for orders placed or performed in visit on 01/02/24   EKG 12-lead    Collection Time: 01/02/24 12:48 PM    Narrative    Test Reason : Z01.810,    Vent. Rate : 098 BPM     Atrial Rate : 098 BPM     P-R Int : 206 ms          QRS Dur : 150 ms      QT Int : 386 ms       P-R-T Axes : 033 -27 020 degrees     QTc Int : 492 ms    Normal sinus rhythm  Right bundle branch block  Abnormal ECG  When compared with ECG of 22-FEB-2023 22:07,  PREVIOUS ECG IS PRESENT  Confirmed by Ilan Barrett MD ([id number]) on 1/3/2024 1:41:42 PM    Referred By: ELBERT NORMAN           Confirmed By:Ilan Barrett MD         Anesthesia Plan  Type of Anesthesia, risks & benefits discussed:    Anesthesia Type: Regional, Gen Supraglottic Airway, Spinal  Intra-op Monitoring Plan: Standard ASA Monitors  Post Op Pain Control Plan: multimodal analgesia and peripheral nerve block  Induction:  IV  Airway Plan: Direct  Informed Consent: Informed consent signed with the Patient and all parties understand the risks and agree with anesthesia plan.  All questions answered.   ASA Score: 3  Day of Surgery Review of History & Physical: H&P Update referred to the surgeon/provider.I have interviewed and examined the patient. I have reviewed the patient's H&P dated: There are no significant changes.     Ready For Surgery From Anesthesia Perspective.     .

## 2024-01-08 NOTE — TRANSFER OF CARE
Anesthesia Transfer of Care Note    Patient: Diogenes Aguilar    Procedure(s) Performed: Procedure(s) (LRB):  ARTHROPLASTY, KNEE, TOTAL, USING COMPUTER-ASSISTED NAVIGATION (Right)    Patient location: PACU    Anesthesia Type: general    Transport from OR: Transported from OR on room air with adequate spontaneous ventilation    Post pain: adequate analgesia    Post assessment: no apparent anesthetic complications    Post vital signs: stable    Level of consciousness: lethargic and responds to stimulation    Nausea/Vomiting: no nausea/vomiting    Complications: none    Transfer of care protocol was followed      Last vitals: Visit Vitals  /72   Pulse 91   Temp 36.7 °C (98.1 °F) (Oral)   Resp 16   Ht 6' (1.829 m)   Wt 122.5 kg (270 lb)   SpO2 (!) 94%   BMI 36.62 kg/m²

## 2024-01-08 NOTE — SUBJECTIVE & OBJECTIVE
Past Medical History:   Diagnosis Date    Carcinoma of prostate     Diverticula, colon 08/19/2021    Essential hypertension     GERD (gastroesophageal reflux disease)     History of colon polyps 08/19/2021    Hyperlipidemia     Obesity     Polyp of ascending colon 08/19/2021       Past Surgical History:   Procedure Laterality Date    ABDOMINAL SURGERY N/A     blockage of the bowels    COLONOSCOPY  08/23/2021    repeat 1 year    LAPAROTOMY, EXPLORATORY N/A 2/24/2023    Procedure: LAPAROTOMY, EXPLORATORY;  Surgeon: Kaleigh Beach MD;  Location: [facility];  Service: General;  Laterality: N/A;  mesh removal    PROSTATECTOMY      UMBILICAL HERNIA REPAIR      x 3        Review of patient's allergies indicates:   Allergen Reactions    Opioids - morphine analogues      PT STATES CAN TOLERATE MORPHINE---CAUSES HEADACHE       No current facility-administered medications on file prior to encounter.     Current Outpatient Medications on File Prior to Encounter   Medication Sig    amLODIPine (NORVASC) 5 MG tablet Take 1 tablet (5 mg total) by mouth 2 (two) times daily.    gemfibroziL (LOPID) 600 MG tablet Take 1 tablet (600 mg total) by mouth once daily. With food.    lisinopriL (PRINIVIL,ZESTRIL) 20 MG tablet Take 1 tablet (20 mg total) by mouth once daily.    omeprazole (PRILOSEC) 20 MG capsule Take 1 capsule (20 mg total) by mouth once daily.    pravastatin (PRAVACHOL) 80 MG tablet TAKE ONE TABLET BY MOUTH ONCE DAILY    vitamin D (VITAMIN D3) 1000 units Tab Take 2,500 Units by mouth once daily.    [DISCONTINUED] HYDROcodone-acetaminophen (NORCO)  mg per tablet Take 1 tablet by mouth every 6 (six) hours as needed for Pain. (Patient not taking: Reported on 1/3/2024)    [DISCONTINUED] HYDROcodone-acetaminophen (NORCO) 7.5-325 mg per tablet Take 1 tablet by mouth every 6 (six) hours as needed for Pain. (Patient not taking: Reported on 1/3/2024)    [DISCONTINUED] promethazine (PHENERGAN) 25 MG tablet TAKE ONE TABLET BY  MOUTH EVERY SIX HOURS AS NEEDED FOR NAUSEA (Patient not taking: Reported on 1/3/2024)     Family History       Problem Relation (Age of Onset)    Breast cancer Mother          Tobacco Use    Smoking status: Never    Smokeless tobacco: Never   Substance and Sexual Activity    Alcohol use: Yes     Comment: on occasion    Drug use: Never    Sexual activity: Not Currently     Review of Systems   Constitutional:  Positive for activity change. Negative for appetite change, diaphoresis, fatigue and fever.   HENT:  Negative for postnasal drip, sore throat, tinnitus and trouble swallowing.    Eyes:  Negative for discharge and itching.   Respiratory:  Negative for cough, shortness of breath and wheezing.    Cardiovascular:  Negative for chest pain, palpitations and leg swelling.   Gastrointestinal:  Negative for abdominal pain, nausea and vomiting.   Genitourinary:  Negative for decreased urine volume.   Musculoskeletal:  Positive for arthralgias, joint swelling and myalgias. Negative for neck pain and neck stiffness.   Skin:  Negative for wound.   Neurological:  Negative for tremors, seizures and syncope.   Hematological:  Negative for adenopathy.   Psychiatric/Behavioral:  Negative for agitation, behavioral problems and confusion.      Objective:     Vital Signs (Most Recent):  Temp: 98.1 °F (36.7 °C) (01/08/24 1124)  Pulse: 82 (01/08/24 1445)  Resp: 16 (01/08/24 1445)  BP: 100/64 (01/08/24 1445)  SpO2: 96 % (01/08/24 1445) Vital Signs (24h Range):  Temp:  [98.1 °F (36.7 °C)-98.2 °F (36.8 °C)] 98.1 °F (36.7 °C)  Pulse:  [76-93] 82  Resp:  [12-16] 16  SpO2:  [94 %-97 %] 96 %  BP: (100-119)/(63-84) 100/64     Weight: 122.5 kg (270 lb)  Body mass index is 36.62 kg/m².     Physical Exam  Vitals and nursing note reviewed.   Constitutional:       General: He is not in acute distress.     Appearance: Normal appearance. He is not ill-appearing.   HENT:      Head: Normocephalic and atraumatic.      Right Ear: External ear normal.       Left Ear: External ear normal.      Nose: Nose normal.   Eyes:      General: No scleral icterus.        Right eye: No discharge.         Left eye: No discharge.      Extraocular Movements: Extraocular movements intact.      Conjunctiva/sclera: Conjunctivae normal.      Pupils: Pupils are equal, round, and reactive to light.   Cardiovascular:      Rate and Rhythm: Normal rate and regular rhythm.      Pulses: Normal pulses.      Heart sounds: Normal heart sounds.   Pulmonary:      Effort: Pulmonary effort is normal.      Breath sounds: Normal breath sounds. No wheezing, rhonchi or rales.   Abdominal:      General: Bowel sounds are normal.      Tenderness: There is no abdominal tenderness. There is no guarding or rebound.   Musculoskeletal:      Right lower leg: No edema.      Left lower leg: No edema.      Comments: Right knee is in cast but neurovascularly intact   Skin:     General: Skin is warm.   Neurological:      General: No focal deficit present.      Mental Status: He is alert and oriented to person, place, and time.   Psychiatric:         Mood and Affect: Mood normal.         Behavior: Behavior normal.         Thought Content: Thought content normal.         Judgment: Judgment normal.          Significant Labs: All pertinent labs within the past 24 hours have been reviewed.    Significant Imaging: I have reviewed all pertinent imaging results/findings within the past 24 hours.  I have reviewed and interpreted all pertinent imaging results/findings within the past 24 hours.

## 2024-01-08 NOTE — HPI
Patient is a 79 yo male with a medical history of hypertension, GERD, hypothyroidism, and hyperlipidemia who was seen and examined by orthopedic surgery for end-stage DJD right knee.This is associated with chronic knee pain that has failed conservative therapies. He underwent a right total knee replacement by Dr. Avila today (1/8/2024). Patient was seen after the surgery. He is afebrible and vital signs signs. Also, pain is controlled and is being managed by the primary team. Hospital medicine was consulted for medical management post surgery

## 2024-01-08 NOTE — CONSULTS
[facility]  [facility]  Consult Note    Patient Name: Diogenes Aguilar  MRN: [id number]  Admission Date: 1/8/2024  Hospital Length of Stay: 0 days  Attending Physician: patient, ER records, and primary team  Primary Care Provider: Timothy Solomon III, DO           Patient information was obtained from patient, ER records, and primary team.     Consults  Subjective:     Principal Problem: DJD (degenerative joint disease) of knee    Chief Complaint: knee pain       HPI: Patient is a 79 yo male with a medical history of hypertension, GERD, hypothyroidism, and hyperlipidemia who was seen and examined by orthopedic surgery for end-stage DJD right knee.This is associated with chronic knee pain that has failed conservative therapies. He underwent a right total knee replacement by Dr. Austin stanford (1/8/2024). Patient was seen after the surgery. He is afebrible and vital signs signs. Also, pain is controlled and is being managed by the primary team. Hospital medicine was consulted for medical management post surgery        Past Medical History:   Diagnosis Date    Carcinoma of prostate     Diverticula, colon 08/19/2021    Essential hypertension     GERD (gastroesophageal reflux disease)     History of colon polyps 08/19/2021    Hyperlipidemia     Obesity     Polyp of ascending colon 08/19/2021       Past Surgical History:   Procedure Laterality Date    ABDOMINAL SURGERY N/A     blockage of the bowels    COLONOSCOPY  08/23/2021    repeat 1 year    LAPAROTOMY, EXPLORATORY N/A 2/24/2023    Procedure: LAPAROTOMY, EXPLORATORY;  Surgeon: Kaleigh Beach MD;  Location: [facility];  Service: General;  Laterality: N/A;  mesh removal    PROSTATECTOMY      UMBILICAL HERNIA REPAIR      x 3        Review of patient's allergies indicates:   Allergen Reactions    Opioids - morphine analogues      PT STATES CAN TOLERATE MORPHINE---CAUSES HEADACHE       No current facility-administered medications on  file prior to encounter.     Current Outpatient Medications on File Prior to Encounter   Medication Sig    amLODIPine (NORVASC) 5 MG tablet Take 1 tablet (5 mg total) by mouth 2 (two) times daily.    gemfibroziL (LOPID) 600 MG tablet Take 1 tablet (600 mg total) by mouth once daily. With food.    lisinopriL (PRINIVIL,ZESTRIL) 20 MG tablet Take 1 tablet (20 mg total) by mouth once daily.    omeprazole (PRILOSEC) 20 MG capsule Take 1 capsule (20 mg total) by mouth once daily.    pravastatin (PRAVACHOL) 80 MG tablet TAKE ONE TABLET BY MOUTH ONCE DAILY    vitamin D (VITAMIN D3) 1000 units Tab Take 2,500 Units by mouth once daily.    [DISCONTINUED] HYDROcodone-acetaminophen (NORCO)  mg per tablet Take 1 tablet by mouth every 6 (six) hours as needed for Pain. (Patient not taking: Reported on 1/3/2024)    [DISCONTINUED] HYDROcodone-acetaminophen (NORCO) 7.5-325 mg per tablet Take 1 tablet by mouth every 6 (six) hours as needed for Pain. (Patient not taking: Reported on 1/3/2024)    [DISCONTINUED] promethazine (PHENERGAN) 25 MG tablet TAKE ONE TABLET BY MOUTH EVERY SIX HOURS AS NEEDED FOR NAUSEA (Patient not taking: Reported on 1/3/2024)     Family History       Problem Relation (Age of Onset)    Breast cancer Mother          Tobacco Use    Smoking status: Never    Smokeless tobacco: Never   Substance and Sexual Activity    Alcohol use: Yes     Comment: on occasion    Drug use: Never    Sexual activity: Not Currently     Review of Systems   Constitutional:  Positive for activity change. Negative for appetite change, diaphoresis, fatigue and fever.   HENT:  Negative for postnasal drip, sore throat, tinnitus and trouble swallowing.    Eyes:  Negative for discharge and itching.   Respiratory:  Negative for cough, shortness of breath and wheezing.    Cardiovascular:  Negative for chest pain, palpitations and leg swelling.   Gastrointestinal:  Negative for abdominal pain, nausea and vomiting.   Genitourinary:  Negative for  decreased urine volume.   Musculoskeletal:  Positive for arthralgias, joint swelling and myalgias. Negative for neck pain and neck stiffness.   Skin:  Negative for wound.   Neurological:  Negative for tremors, seizures and syncope.   Hematological:  Negative for adenopathy.   Psychiatric/Behavioral:  Negative for agitation, behavioral problems and confusion.      Objective:     Vital Signs (Most Recent):  Temp: 98.1 °F (36.7 °C) (01/08/24 1124)  Pulse: 82 (01/08/24 1445)  Resp: 16 (01/08/24 1445)  BP: 100/64 (01/08/24 1445)  SpO2: 96 % (01/08/24 1445) Vital Signs (24h Range):  Temp:  [98.1 °F (36.7 °C)-98.2 °F (36.8 °C)] 98.1 °F (36.7 °C)  Pulse:  [76-93] 82  Resp:  [12-16] 16  SpO2:  [94 %-97 %] 96 %  BP: (100-119)/(63-84) 100/64     Weight: 122.5 kg (270 lb)  Body mass index is 36.62 kg/m².     Physical Exam  Vitals and nursing note reviewed.   Constitutional:       General: He is not in acute distress.     Appearance: Normal appearance. He is not ill-appearing.   HENT:      Head: Normocephalic and atraumatic.      Right Ear: External ear normal.      Left Ear: External ear normal.      Nose: Nose normal.   Eyes:      General: No scleral icterus.        Right eye: No discharge.         Left eye: No discharge.      Extraocular Movements: Extraocular movements intact.      Conjunctiva/sclera: Conjunctivae normal.      Pupils: Pupils are equal, round, and reactive to light.   Cardiovascular:      Rate and Rhythm: Normal rate and regular rhythm.      Pulses: Normal pulses.      Heart sounds: Normal heart sounds.   Pulmonary:      Effort: Pulmonary effort is normal.      Breath sounds: Normal breath sounds. No wheezing, rhonchi or rales.   Abdominal:      General: Bowel sounds are normal.      Tenderness: There is no abdominal tenderness. There is no guarding or rebound.   Musculoskeletal:      Right lower leg: No edema.      Left lower leg: No edema.      Comments: Right knee is in cast but neurovascularly intact    Skin:     General: Skin is warm.   Neurological:      General: No focal deficit present.      Mental Status: He is alert and oriented to person, place, and time.   Psychiatric:         Mood and Affect: Mood normal.         Behavior: Behavior normal.         Thought Content: Thought content normal.         Judgment: Judgment normal.          Significant Labs: All pertinent labs within the past 24 hours have been reviewed.    Significant Imaging: I have reviewed all pertinent imaging results/findings within the past 24 hours.  I have reviewed and interpreted all pertinent imaging results/findings within the past 24 hours.  Assessment/Plan:     * DJD (degenerative joint disease) of knee  - S/P right knee replacement, 1/8  - Continue Cefazolin and Vancomycin as per orthopedic surgery  - Continue IV hydration  - Diludid 0.5 mg and morphine as scheduled by primary team      Essential hypertension  - Blood pressure is borderline after the surgery  - Holding home blood pressure medications  - Monitor blood pressure and resumed home blood pressure medications as indicated    Gastroesophageal reflux disease  Order Protonix 40 mg daily but resume home PPI at discharge      Hyperlipidemia  Continue home prevastatin and gemfibrozil        VTE Risk Mitigation (From admission, onward)           Ordered     IP VTE HIGH RISK PATIENT  Once         01/08/24 1105     Place TUCKER hose  Until discontinued         01/08/24 1105                        Thank you for your consult. I will follow-up with patient. Please contact us if you have any additional questions.    Stanley Baker MD  Department of Hospital Medicine   [facility] - Orthopedic Periop Services

## 2024-01-08 NOTE — PT/OT/SLP EVAL
Physical Therapy Evaluation    Patient Name:  Diogenes Aguilar   MRN:  [id number]    Recommendations:     Discharge Recommendations:     Discharge Equipment Recommendations: none   Barriers to discharge: None    Assessment:     Diogenes Aguilar is a 78 y.o. male admitted with a medical diagnosis of DJD (degenerative joint disease) of knee.  He presents with the following impairments/functional limitations: decreased ROM, gait instability, orthopedic precautions, impaired functional mobility Patient with normal post op pain and weakness. Plan is home with home health tomorrow. .    Rehab Prognosis: Good; patient would benefit from acute skilled PT services to address these deficits and reach maximum level of function.    Recent Surgery: Procedure(s) (LRB):  ARTHROPLASTY, KNEE, TOTAL, USING COMPUTER-ASSISTED NAVIGATION (Right) Day of Surgery    Plan:     During this hospitalization, patient to be seen BID to address the identified rehab impairments via gait training, therapeutic activities, therapeutic exercises and progress toward the following goals:    Plan of Care Expires:  01/08/24    Subjective     Chief Complaint: post op pain  Patient/Family Comments/goals: Plan is dc home tomorrow  Pain/Comfort:  Pain Rating 1: 4/10  Location - Side 1: Right  Location 1: knee  Pain Addressed 1: Cessation of Activity, Pre-medicate for activity    Patients cultural, spiritual, Confucianist conflicts given the current situation: no    Living Environment:  Lives with spouse, 4 steps entering home  Prior to admission, patients level of function was independent.  Equipment used at home: 3-in-1 commode, walker, rolling.  DME owned (not currently used): rolling walker.  Upon discharge, patient will have assistance from spouse.    Objective:     Communicated with nurse prior to session.  Patient found supine with pulse ox (continuous), telemetry, blood pressure cuff  upon PT entry to room.    General Precautions: Standard, fall  Orthopedic  Precautions:RLE weight bearing as tolerated   Braces: Knee immobilizer  Respiratory Status: Room air    Exams:  RLE ROM: knee immobilized  RLE Strength: 3/5  LLE ROM: WNL  LLE Strength: WNL    Functional Mobility:  Bed Mobility:     Supine to Sit: minimum assistance  Sit to Supine: minimum assistance  Transfers:     Sit to Stand:  contact guard assistance with rolling walker  Gait: ambulated 50 feet with rolling walker      AM-PAC 6 CLICK MOBILITY  Total Score:18       Treatment & Education:  Tx plan    Patient left supine with call button in reach.    GOALS:   Multidisciplinary Problems       Physical Therapy Goals       Not on file                    History:     Past Medical History:   Diagnosis Date    Carcinoma of prostate     Diverticula, colon 08/19/2021    Essential hypertension     GERD (gastroesophageal reflux disease)     History of colon polyps 08/19/2021    Hyperlipidemia     Obesity     Polyp of ascending colon 08/19/2021       Past Surgical History:   Procedure Laterality Date    ABDOMINAL SURGERY N/A     blockage of the bowels    COLONOSCOPY  08/23/2021    repeat 1 year    LAPAROTOMY, EXPLORATORY N/A 2/24/2023    Procedure: LAPAROTOMY, EXPLORATORY;  Surgeon: Kaleigh Beach MD;  Location: [facility];  Service: General;  Laterality: N/A;  mesh removal    PROSTATECTOMY      UMBILICAL HERNIA REPAIR      x 3        Time Tracking:     PT Received On: 01/08/24  PT Start Time: 1710     PT Stop Time: 1730  PT Total Time (min): 20 min     Billable Minutes: Evaluation 20      01/08/2024

## 2024-01-08 NOTE — OP NOTE
Department of Orthopedic Surgery    Operative Note  NAME: Diogenes Aguilar    : 1945    SURGERY DATE: 2024     PREOPERATIVE DIAGNOSIS: Primary osteoarthritis of right knee [M17.11]    POSTOPERATIVE DIAGNOSIS:  Primary osteoarthritis of right knee [M17.11]    PROCEDURE: right Total Knee Arthroplasty computer navigated  Mahin computer navigation  Depuy implants  Seven posterior stabilized femur  9 tibial tray  6 poly posterior stabilized  41 patella    SURGEON: Bill Avila III, MD    ASSISTANT:  Moisés    ANESTHESIA:  General adductor canal block    BLOOD LOSS:  100 cc    TOURNIQUET TIME:  79 minutes drains placed x2    DRAINS: Hemovac drain    IMPLANT:  Implant Name Type Inv. Item Serial No.  Lot No. LRB No. Used Action   CEMENT BONE SURG SMPLX P RADPQ - [id number]  CEMENT BONE SURG SMPLX P RADPQ  MAHIN Qubell ERNESTO. [id number] Right 1 Implanted   CEMENT BONE SURG SMPLX P RADPQ - [id number]  CEMENT BONE SURG SMPLX P RADPQ  MAHIN Qubell ERNESTO. [id number] Right 1 Implanted   SYS KNEE EPAK PIN ATTUNE - [id number]  SYS KNEE EPAK PIN ATTUNE  [employer] [id number] Right 1 Implanted and Explanted   KOMET GUIDE PIN SMOOTH      Right 1 Implanted and Explanted   KOMET GUIDE SCREW      Right 1 Implanted and Explanted   COMP FEM POS ATTUNE SZ7 R - [id number]  COMP FEM POS ATTUNE SZ7 R  [employer] [id number] Right 1 Implanted   DEPUY SYNTHES ATTUNE KNEE SYSTEM TIBIAL BASE FIXED BEARING SIZE 9 CEMENTED     [id number] Right 1 Implanted   PATELLA ATTUNE MEDLZD DOME 41 - [id number]  PATELLA ATTUNE MEDLZD DOME 41  [employer] [id number] Right 1 Implanted   DEPUY SYNTHES ATTUNE KNEE SYSTEM TIBIAL INSERT FIXED BEARING POSTERIOR STABILIZED SIZE 7 6MM AOX     [id number] Right 1 Implanted         INDICATIONS FOR PROCEDURE:   [unfilled] is a 78 y.o.-year-old with debilitating right-sided knee pain despite nonoperative measures and interested in knee arthroplasty.    PROCEDURE IN DETAIL:  After obtaining informed consent and starting  the patient on preoperative IV antibiotics, the patient was taken back to the Operating   Room. Anesthesia was performed by Anesthesia Team. The right lower  extremity was prepped and draped in normal sterile fashion.  An Esmarch bandage was used to exsanguinate the affected lower extremity and the pneumatic tourniquet  was inflated to 300 mmHg.  A standard longitudinal midline incision was made beginning 3 fingerbreadths above the superior pole of the patella extending down to the tibial tubercle.  Full-thickness skin flaps were raised.  A medial parapatellar arthrotomy was made. The patella was then everted.    A release of the proximal medial tibia and MCL was then undertaken.  The infrapatella fat pad was resected. Two pins were placed in the femur and two pins in the tibia for computer navigation.  The leg was placed through range of motion to get the overall alignment of the femoral head.  Landmarks on the femur and the tibia were then marked.  Using computer navigation 9 mm were resected from the femur with 5 degrees of valgus.  The cut was verified using the computer array.  The size of the femoral prosthesis was re-verified mechanically in addition to the computer verification system.  Chamfer cuts were then made with 3 degrees of external rotation for a size 7 femoral component.  The notch cuts for posterior stabilization were then made.  A trial reduction was performed using the femoral trials.  Attention was then turned to the tibial side.  Once again using computer navigation, a resection of approximately 9 mm was performed with approximately a 3-degree posterior slope.  The mechanical trials were used at this point to measure the appropriate tibial baseplate, which was now.  The outrigger scott was then used for rotational verification.  Once the rotation was confirmed with the outrigger scott, the tibial plateau was drilled then impacted for the stem.  With the trial tibial baseplate and the femoral  component in place, the poly was trialed for overall extension, flexion, and balancing.  The best fit poly was sick.  Attention was then turned to the patella which with the guide set at 9 mm, the cut was made.  It was mechanically sized to a size 41 and the three PEG holes drilled.  A repeat trial reduction once again was performed, verifying the patellar stability, obtaining full extension without hyperextension and good flexion.  The knee was then injected with 10 ccs of Marcaine and 10 ccs of Duramorph in the synovial tissue.  Hemostasis was also obtained being careful to check posterior, medial and lateral.  The knee was copiously irrigated out.  At this time, two bags of surgical Simplex P cement were mixed with 1.2 of tobramycin using third generation vacuum .  Once the cement was thick,  a cement gun was used to inject the tibia and the stem on the baseplate.  Then the tibial baseplate was impacted in place.  Excess cement was removed.  The femoral component was then impacted with extra cement removed.  Trial poly was placed. The leg was placed in full extension with pressure on the leg to maintain extension.  Any excess cement was once again removed at this point.  The patella was then cemented in place and clamped.  Once all the cement had hardened, the trial poly was removed and any excess cement was removed, being careful to check under the medial and lateral femoral condyles for any excess cement.  The knee was copiously irrigated out.  The tourniquet was then released and hemostasis was obtained.  The knee was irrigated out and a posterior stabilized polyethelene was impacted onto the tibial baseplate. The knee was once again placed through range of motion, verifying full extension, stability to varus and valgus stressing and flexion.  Hemostasis once again obtained using Bovie.  The knee was irrigated out, drains were placed x 2 using medium Hemovac.  The capsule was closed with #2  Ti-Cron.  Then 0 and 2-0 Vicryl were used to close the subcutaneous tissue.  The skin was closed with staples.  A silver dressing was applied to the incision with a bulky sterile dressing around the knee and a compressive dressing from the foot to the thigh.  A cryo-cuff and a knee immobilizer were applied over the dressing.  The patient tolerated the procedure well without complications.           Bill Avila III, MD

## 2024-01-09 VITALS
RESPIRATION RATE: 18 BRPM | HEIGHT: 72 IN | SYSTOLIC BLOOD PRESSURE: 103 MMHG | DIASTOLIC BLOOD PRESSURE: 66 MMHG | TEMPERATURE: 98 F | HEART RATE: 98 BPM | WEIGHT: 270 LBS | OXYGEN SATURATION: 95 % | BODY MASS INDEX: 36.57 KG/M2

## 2024-01-09 LAB
ALBUMIN SERPL BCP-MCNC: 3.2 G/DL (ref 3.5–5)
ALBUMIN/GLOB SERPL: 1 {RATIO}
ALP SERPL-CCNC: 76 U/L (ref 45–115)
ALT SERPL W P-5'-P-CCNC: 13 U/L (ref 16–61)
ANION GAP SERPL CALCULATED.3IONS-SCNC: 13 MMOL/L (ref 7–16)
AST SERPL W P-5'-P-CCNC: 16 U/L (ref 15–37)
BASOPHILS # BLD AUTO: 0.02 K/UL (ref 0–0.2)
BASOPHILS NFR BLD AUTO: 0.2 % (ref 0–1)
BILIRUB SERPL-MCNC: 0.6 MG/DL (ref ?–1.2)
BUN SERPL-MCNC: 13 MG/DL (ref 7–18)
BUN/CREAT SERPL: 15 (ref 6–20)
CALCIUM SERPL-MCNC: 8.4 MG/DL (ref 8.5–10.1)
CHLORIDE SERPL-SCNC: 105 MMOL/L (ref 98–107)
CO2 SERPL-SCNC: 24 MMOL/L (ref 21–32)
CREAT SERPL-MCNC: 0.88 MG/DL (ref 0.7–1.3)
DIFFERENTIAL METHOD BLD: ABNORMAL
EGFR (NO RACE VARIABLE) (RUSH/TITUS): 89 ML/MIN/1.73M2
EOSINOPHIL # BLD AUTO: 0 K/UL (ref 0–0.5)
EOSINOPHIL NFR BLD AUTO: 0 % (ref 1–4)
ERYTHROCYTE [DISTWIDTH] IN BLOOD BY AUTOMATED COUNT: 13.5 % (ref 11.5–14.5)
GLOBULIN SER-MCNC: 3.1 G/DL (ref 2–4)
GLUCOSE SERPL-MCNC: 103 MG/DL (ref 74–106)
HCT VFR BLD AUTO: 39 % (ref 40–54)
HGB BLD-MCNC: 13 G/DL (ref 13.5–18)
IMM GRANULOCYTES # BLD AUTO: 0.06 K/UL (ref 0–0.04)
IMM GRANULOCYTES NFR BLD: 0.5 % (ref 0–0.4)
LYMPHOCYTES # BLD AUTO: 1.48 K/UL (ref 1–4.8)
LYMPHOCYTES NFR BLD AUTO: 11.4 % (ref 27–41)
MCH RBC QN AUTO: 29.5 PG (ref 27–31)
MCHC RBC AUTO-ENTMCNC: 33.3 G/DL (ref 32–36)
MCV RBC AUTO: 88.6 FL (ref 80–96)
MONOCYTES # BLD AUTO: 1.33 K/UL (ref 0–0.8)
MONOCYTES NFR BLD AUTO: 10.2 % (ref 2–6)
MPC BLD CALC-MCNC: 8.9 FL (ref 9.4–12.4)
NEUTROPHILS # BLD AUTO: 10.13 K/UL (ref 1.8–7.7)
NEUTROPHILS NFR BLD AUTO: 77.7 % (ref 53–65)
NRBC # BLD AUTO: 0 X10E3/UL
NRBC, AUTO (.00): 0 %
PLATELET # BLD AUTO: 312 K/UL (ref 150–400)
POTASSIUM SERPL-SCNC: 4.3 MMOL/L (ref 3.5–5.1)
PROT SERPL-MCNC: 6.3 G/DL (ref 6.4–8.2)
RBC # BLD AUTO: 4.4 M/UL (ref 4.6–6.2)
SODIUM SERPL-SCNC: 138 MMOL/L (ref 136–145)
WBC # BLD AUTO: 13.02 K/UL (ref 4.5–11)

## 2024-01-09 PROCEDURE — 80053 COMPREHEN METABOLIC PANEL: CPT | Performed by: ORTHOPAEDIC SURGERY

## 2024-01-09 PROCEDURE — 85025 COMPLETE CBC W/AUTO DIFF WBC: CPT | Performed by: ORTHOPAEDIC SURGERY

## 2024-01-09 PROCEDURE — 97535 SELF CARE MNGMENT TRAINING: CPT

## 2024-01-09 PROCEDURE — 94761 N-INVAS EAR/PLS OXIMETRY MLT: CPT

## 2024-01-09 PROCEDURE — 97530 THERAPEUTIC ACTIVITIES: CPT

## 2024-01-09 PROCEDURE — 25000003 PHARM REV CODE 250: Performed by: ORTHOPAEDIC SURGERY

## 2024-01-09 PROCEDURE — 99214 OFFICE O/P EST MOD 30 MIN: CPT | Mod: ,,, | Performed by: INTERNAL MEDICINE

## 2024-01-09 PROCEDURE — 97116 GAIT TRAINING THERAPY: CPT

## 2024-01-09 PROCEDURE — 25000003 PHARM REV CODE 250

## 2024-01-09 PROCEDURE — 97110 THERAPEUTIC EXERCISES: CPT

## 2024-01-09 RX ORDER — MUPIROCIN 20 MG/G
OINTMENT TOPICAL 2 TIMES DAILY
Status: DISCONTINUED | OUTPATIENT
Start: 2024-01-09 | End: 2024-01-09 | Stop reason: HOSPADM

## 2024-01-09 RX ORDER — ASPIRIN 325 MG
325 TABLET, DELAYED RELEASE (ENTERIC COATED) ORAL 2 TIMES DAILY
Refills: 0
Start: 2024-01-09 | End: 2025-01-08

## 2024-01-09 RX ADMIN — GEMFIBROZIL 600 MG: 600 TABLET ORAL at 09:01

## 2024-01-09 RX ADMIN — OXYCODONE HYDROCHLORIDE AND ACETAMINOPHEN 1 TABLET: 10; 325 TABLET ORAL at 05:01

## 2024-01-09 RX ADMIN — MUPIROCIN: 20 OINTMENT TOPICAL at 09:01

## 2024-01-09 RX ADMIN — OXYCODONE HYDROCHLORIDE AND ACETAMINOPHEN 1 TABLET: 10; 325 TABLET ORAL at 12:01

## 2024-01-09 RX ADMIN — Medication 2400 UNITS: at 09:01

## 2024-01-09 RX ADMIN — ASPIRIN 81 MG CHEWABLE TABLET 81 MG: 81 TABLET CHEWABLE at 09:01

## 2024-01-09 NOTE — PT/OT/SLP PROGRESS
Physical Therapy Treatment    Patient Name:  Diogenes Aguilar   MRN:  [id number]    Recommendations:     Discharge Recommendations:    Discharge Equipment Recommendations: none  Barriers to discharge: None    Assessment:     Diogenes Aguilar is a 78 y.o. male admitted with a medical diagnosis of DJD (degenerative joint disease) of knee.  He presents with the following impairments/functional limitations: decreased ROM, gait instability, orthopedic precautions, impaired functional mobility Patient with good initial rom and mobility today. Plan is dc home with HH. .    Rehab Prognosis: Good; patient would benefit from acute skilled PT services to address these deficits and reach maximum level of function.    Recent Surgery: Procedure(s) (LRB):  ARTHROPLASTY, KNEE, TOTAL, USING COMPUTER-ASSISTED NAVIGATION (Right) 1 Day Post-Op    Plan:     During this hospitalization, patient to be seen BID to address the identified rehab impairments via gait training, therapeutic activities, therapeutic exercises and progress toward the following goals:    Plan of Care Expires:  01/08/24    Subjective     Chief Complaint: post op pain  Patient/Family Comments/goals: Plan is dc home today  Pain/Comfort:  Pain Rating 1: 5/10  Location - Side 1: Right  Location 1: knee  Pain Addressed 1: Cessation of Activity, Pre-medicate for activity      Objective:     Communicated with nurse prior to session.  Patient found HOB elevated with pulse ox (continuous), telemetry, blood pressure cuff upon PT entry to room.     General Precautions: Standard, fall  Orthopedic Precautions: RLE weight bearing as tolerated  Braces: Knee immobilizer  Respiratory Status: Room air     Functional Mobility:  Bed Mobility:     Supine to Sit: minimum assistance  Sit to Supine: minimum assistance  Transfers:     Sit to Stand:  minimum assistance with rolling walker  Gait: ambulated 60 feet with rolling walker, step to gait      AM-PAC 6 CLICK MOBILITY  Turning over in bed  (including adjusting bedclothes, sheets and blankets)?: 3  Sitting down on and standing up from a chair with arms (e.g., wheelchair, bedside commode, etc.): 3  Moving from lying on back to sitting on the side of the bed?: 3  Moving to and from a bed to a chair (including a wheelchair)?: 3  Need to walk in hospital room?: 3  Climbing 3-5 steps with a railing?: 3  Basic Mobility Total Score: 18       Treatment & Education:  Stair traiing x 2 steps  RLE: TKR protocol 3x10  10 passive flexion stretches with 10 sh  HEP and ice machine  0-90 knee flexion    Patient left supine with all lines intact..    GOALS:   Multidisciplinary Problems       Physical Therapy Goals       Not on file                    Time Tracking:     PT Received On: 01/09/24  PT Start Time: 0900     PT Stop Time: 0945  PT Total Time (min): 45 min     Billable Minutes: Gait Training 10, Therapeutic Activity 10, and Therapeutic Exercise 15    Treatment Type: Treatment  PT/PTA: PT     Number of PTA visits since last PT visit: 0     01/09/2024

## 2024-01-09 NOTE — NURSING
Discharge instructions reviewed with patient and spouse; and copy given to patient. Patient and spouse voiced understanding regarding:meds, appt., signs and symptoms to report to physician.       *Keep dressing dry and intact, do not remove dressing, if dressing becomes wet or bloody notify home health staff.  Swingbed/Home Health will remove your drain (if you have one) on post op day #2 and change your dressing on post op day #3 and day #10, give them that special dressing we sent home with you.  *Continue incentive spirometry at least every 2 hours while awake.  *Continue white stockings remove 2 times a day for 1 hour and replace. Once dressing is changed, apply other stocking to surgery leg.   *Continue cool-jet to knee. Do not apply directly to skin.   *Take laxative of choice to have a bowel movement at least by tomorrow and then every other day.  *Increase fluids by mouth.  *Staples will be removed at follow up appointment in 2 weeks from surgery.  *Elevate surgery leg on pillow at ankle. No pillow under knees.  *Notify swingbed/home health staff of any concerns.    Verified that patient has the following for discharge:2 roberta hoses, incentive spirometer,nozin, rolling walker, toilet riser at home; dressing change, icepacks, immobilizer. Nurse notified patient to receive dc meds prior to leaving

## 2024-01-09 NOTE — PT/OT/SLP EVAL
Occupational Therapy   Evaluation    Name: Diogenes Aguilar  MRN: [id number]  Admitting Diagnosis: DJD (degenerative joint disease) of knee  Recent Surgery: Procedure(s) (LRB):  ARTHROPLASTY, KNEE, TOTAL, USING COMPUTER-ASSISTED NAVIGATION (Right) Day of Surgery    Recommendations:     Discharge Recommendations: Low Intensity Therapy  Discharge Equipment Recommendations:   (to be determined)  Barriers to discharge:  None    Assessment:     Diogenes Aguilar is a 78 y.o. male with a medical diagnosis of DJD (degenerative joint disease) of knee.  He presents with complaint of (R) Knee pain. Pt agreed to OT evaluation. Performance deficits affecting function: weakness, impaired endurance, impaired self care skills, impaired functional mobility, decreased lower extremity function, pain.      Rehab Prognosis: Good; patient would benefit from acute skilled OT services to address these deficits and reach maximum level of function.       Plan:     Patient to be seen 5 x/week to address the above listed problems via self-care/home management, therapeutic activities, therapeutic exercises  Plan of Care Expires: 02/12/24  Plan of Care Reviewed with: patient, spouse    Subjective     Chief Complaint: (R) TKR  Patient/Family Comments/goals: To return home    Occupational Profile:  Living Environment: Pt lives in 1 story home with wife with 4 steps and (B) rails to enter  Previous level of function: (I) with self care prior  Roles and Routines: I with daily activities  Equipment Used at Home: shower chair, walker, rolling (elevated toilet seat frame)  Assistance upon Discharge: Wife    Pain/Comfort:  Pain Rating 1: 4/10  Location - Side 1: Right  Location 1: knee  Pain Addressed 1: Pre-medicate for activity, Reposition, Distraction  Pain Rating Post-Intervention 1: 4/10    Patients cultural, spiritual, Hindu conflicts given the current situation: no    Objective:     Communicated with: RN prior to session.  Patient found HOB elevated  with blood pressure cuff, knee immobilizer, pulse ox (continuous), telemetry upon OT entry to room.    General Precautions: Standard, fall  Orthopedic Precautions: RLE weight bearing as tolerated  Braces: Knee immobilizer  Respiratory Status: Room air    Occupational Performance:    Bed Mobility:    Patient completed Rolling/Turning to Left with  minimum assistance  Patient completed Supine to Sit with minimum assistance and with (R) LE management  Patient completed Sit to Supine with minimum assistance and with (R) LE management    Functional Mobility/Transfers:  Patient completed Sit <> Stand Transfer with minimum assistance  with  gait belt to stand top RW   Functional Mobility: Min a     Activities of Daily Living:  Upper Body Dressing: moderate assistance lory somers    Cognitive/Visual Perceptual:  Cognitive/Psychosocial Skills:     -       Oriented to: Person, Place, and Situation   -       Follows Commands/attention:Follows one-step commands  -       Communication: clear/fluent  Visual/Perceptual:      -wears glasses. Hearing WFL      Physical Exam:  Balance:    -       SBA with EOB sitting, Min a with mobility  Skin integrity: Visible skin intact  Edema:  None noted  Sensation:    -       Intact  Motor Planning:    -       WFL  Dominant hand:    -       Right  Upper Extremity Range of Motion:     -       Right Upper Extremity: WFL  -       Left Upper Extremity: WFL  Upper Extremity Strength:    -       Right Upper Extremity: WFL  -       Left Upper Extremity: WFL   Strength:    -       Right Upper Extremity: WFL  -       Left Upper Extremity: WFL    AMPAC 6 Click ADL:  AMPAC Total Score: 16    Treatment & Education:  OT evaluation completed. See eval for details. Pt presents with decline in status due to (R) TKR. Tx plan it to focus on increasing (I) with self care and mobility.  Pt educated on OT role/POC.   Importance of OOB activity.  Importance of sitting up in the chair   Safety during  functional t/f and mobility with use of RW  Importance of assisting with self-care activities   All questions/concerns answered within OT scope of practice     Patient left HOB elevated with all lines intact, call button in reach, nurse notified, and wife present    GOALS:   Multidisciplinary Problems       Occupational Therapy Goals          Problem: Occupational Therapy    Goal Priority Disciplines Outcome Interventions   Occupational Therapy Goal     OT, PT/OT Ongoing, Progressing    Description: ST.Pt will perform bathing with min a  2.Pt will perform UE dressing with (I)  3.Pt will perform LE dressing with Min a with AD as needed  4.Pt will transfer bed/chair/bsc with CGA/SBA  5.Pt will perform standing task x 2 min with CGA/SBA  6.Tolerate 15 min of tx without fatigue.      LTG:   Restore to max I with selfcare and mobility.                          History:     Past Medical History:   Diagnosis Date    Carcinoma of prostate     Diverticula, colon 2021    Essential hypertension     GERD (gastroesophageal reflux disease)     History of colon polyps 2021    Hyperlipidemia     Obesity     Polyp of ascending colon 2021         Past Surgical History:   Procedure Laterality Date    ABDOMINAL SURGERY N/A     blockage of the bowels    COLONOSCOPY  2021    repeat 1 year    LAPAROTOMY, EXPLORATORY N/A 2023    Procedure: LAPAROTOMY, EXPLORATORY;  Surgeon: Kaleigh Beach MD;  Location: [facility];  Service: General;  Laterality: N/A;  mesh removal    PROSTATECTOMY      UMBILICAL HERNIA REPAIR      x 3        Time Tracking:     OT Date of Treatment: 24  OT Start Time: 1709  OT Stop Time: 1728  OT Total Time (min): 19 min    Billable Minutes:Evaluation 2024

## 2024-01-09 NOTE — PLAN OF CARE
[facility]  Discharge Final Note    Primary Care Provider: Timothy Solomon III, DO    Expected Discharge Date: 1/9/2024    Final Discharge Note (most recent)       Final Note - 01/09/24 0929          Final Note    Assessment Type Final Discharge Note     Anticipated Discharge Disposition Home-Health Care [facility]     What phone number can be called within the next 1-3 days to see how you are doing after discharge? [phone]        Post-Acute Status    Post-Acute Authorization HME;Home Health     HME Status Set-up Complete/Auth obtained     Home Health Status Set-up Complete/Auth obtained     Patient choice form signed by patient/caregiver List with quality metrics by geographic area provided;List from CMS Compare     Discharge Delays None known at this time                     Important Message from Medicare              Follow-up providers       Bill Avila III, MD   Specialty: Orthopedic Surgery    [address] [place] [id number]   Phone: [phone]       Next Steps: Follow up in 2 week(s)    Instructions: Please follow up on January 26 at 9:20              After-discharge care                Durable Medical Equipment       [facility]   Service: Durable Medical Equipment    [address] [address]   Phone: [phone]                 Home Medical Care       [facility]   Service: Home Health Services    [address] [facility] [address]   Phone: [phone]                             Pt to [place] home today. SW faxed dc orders and summary to [facility]. No other needs.

## 2024-01-09 NOTE — PLAN OF CARE
Problem: Occupational Therapy  Goal: Occupational Therapy Goal  Description: ST.Pt will perform bathing with min a  2.Pt will perform UE dressing with (I)  3.Pt will perform LE dressing with Min a with AD as needed  4.Pt will transfer bed/chair/bsc with CGA/SBA  5.Pt will perform standing task x 2 min with CGA/SBA  6.Tolerate 15 min of tx without fatigue.      LTG:   Restore to max I with selfcare and mobility.     Outcome: Ongoing, Progressing

## 2024-01-09 NOTE — SUBJECTIVE & OBJECTIVE
Interval History: Patient was seen and examined in bed with no acute event overnight. He denies fever, chills, headache, chest pain, palpitations, shortness of breath, nausea, vomiting and abdominal pain. Vital signs are stable with /71 and pulse of 93. We will continue to hold home blood pressure medication at discharge. Patient is instructed to check his blood pressure at home and resume blood pressure medication if blood pressure is elevated. He will follow up with PCP for continued medical management post surgery.    Review of Systems   Constitutional:  Positive for activity change. Negative for appetite change, diaphoresis, fatigue and fever.   HENT:  Negative for postnasal drip, sore throat, tinnitus and trouble swallowing.    Eyes:  Negative for discharge and itching.   Respiratory:  Negative for cough, shortness of breath and wheezing.    Cardiovascular:  Negative for chest pain, palpitations and leg swelling.   Gastrointestinal:  Negative for abdominal pain, nausea and vomiting.   Genitourinary:  Negative for decreased urine volume.   Musculoskeletal:  Positive for arthralgias, joint swelling and myalgias. Negative for neck pain and neck stiffness.   Skin:  Negative for wound.   Neurological:  Negative for tremors, seizures and syncope.   Hematological:  Negative for adenopathy.   Psychiatric/Behavioral:  Negative for agitation, behavioral problems and confusion.      Objective:     Vital Signs (Most Recent):  Temp: 98.4 °F (36.9 °C) (01/09/24 0600)  Pulse: 93 (01/09/24 0600)  Resp: 18 (01/09/24 0600)  BP: 114/71 (01/09/24 0600)  SpO2: (!) 93 % (01/09/24 0600) Vital Signs (24h Range):  Temp:  [98.1 °F (36.7 °C)-98.4 °F (36.9 °C)] 98.4 °F (36.9 °C)  Pulse:  [76-94] 93  Resp:  [12-18] 18  SpO2:  [93 %-97 %] 93 %  BP: (100-123)/(56-80) 114/71     Weight: 122.5 kg (270 lb)  Body mass index is 36.62 kg/m².    Intake/Output Summary (Last 24 hours) at 1/9/2024 1020  Last data filed at 1/9/2024 0563  Gross  per 24 hour   Intake 1233.8 ml   Output 3020 ml   Net -1786.2 ml         Physical Exam  Vitals and nursing note reviewed.   Constitutional:       General: He is not in acute distress.     Appearance: Normal appearance. He is not ill-appearing.   HENT:      Head: Normocephalic and atraumatic.      Right Ear: External ear normal.      Left Ear: External ear normal.      Nose: Nose normal.   Eyes:      General: No scleral icterus.        Right eye: No discharge.         Left eye: No discharge.      Extraocular Movements: Extraocular movements intact.      Conjunctiva/sclera: Conjunctivae normal.      Pupils: Pupils are equal, round, and reactive to light.   Cardiovascular:      Rate and Rhythm: Normal rate and regular rhythm.      Pulses: Normal pulses.      Heart sounds: Normal heart sounds.   Pulmonary:      Effort: Pulmonary effort is normal.      Breath sounds: Normal breath sounds. No wheezing, rhonchi or rales.   Abdominal:      General: Bowel sounds are normal.      Tenderness: There is no abdominal tenderness. There is no guarding or rebound.   Musculoskeletal:      Right lower leg: No edema.      Left lower leg: No edema.      Comments: Right knee is neurovascularly intact   Skin:     General: Skin is warm.   Neurological:      General: No focal deficit present.      Mental Status: He is alert and oriented to person, place, and time.   Psychiatric:         Mood and Affect: Mood normal.         Behavior: Behavior normal.         Thought Content: Thought content normal.         Judgment: Judgment normal.             Significant Labs: All pertinent labs within the past 24 hours have been reviewed.    Significant Imaging: I have reviewed all pertinent imaging results/findings within the past 24 hours.  I have reviewed and interpreted all pertinent imaging results/findings within the past 24 hours.

## 2024-01-09 NOTE — ASSESSMENT & PLAN NOTE
- Blood pressure is borderline after the surgery  - Holding home blood pressure medications  - Monitor blood pressure and resumed home blood pressure medications as indicated    Hold home blood pressure medication at discharge. Patient instructed to check blood pressure at home and resume blood medication if blood pressure is elevated SBP > 130 or DBP > 90. Also, patient will follow up with PCP for continued medical management

## 2024-01-09 NOTE — ASSESSMENT & PLAN NOTE
- Post op day 1, S/P right knee replacement on 1/8/2024  - Continue Cefazolin and Vancomycin as per orthopedic surgery  - Continue IV hydration  - Diludid 0.5 mg and morphine as scheduled by primary team

## 2024-01-09 NOTE — DISCHARGE INSTRUCTIONS
*Keep dressing dry and intact, do not remove dressing, if dressing becomes wet or bloody notify home health staff.  Swingbed/Home Health will remove your drain (if you have one) on post op day #2 and change your dressing on post op day #3 and day #10, give them that special dressing we sent home with you.  *Continue incentive spirometry at least every 2 hours while awake.  *Continue white stockings remove 2 times a day for 1 hour and replace. Once dressing is changed, apply other stocking to surgery leg.   *Continue cool-jet to knee. Do not apply directly to skin.   *Take laxative of choice to have a bowel movement at least by tomorrow and then every other day.  *Increase fluids by mouth.  *Staples will be removed at follow up appointment in 2 weeks from surgery.  *Elevate surgery leg on pillow at ankle. No pillow under knees.  *Notify swingbed/home health staff of any concerns.

## 2024-01-09 NOTE — CONSULTS
[facility]  [facility]  Consult Note    Patient Name: Diogenes Aguilar  MRN: [id number]  Admission Date: 1/8/2024  Hospital Length of Stay: 0 days  Attending Physician: Bill Avila III, MD   Primary Care Provider: Timothy Solomon III, DO           Patient information was obtained from patient, past medical records, ER records, and primary team.     Consults  Subjective:     Principal Problem: DJD (degenerative joint disease) of knee    Chief Complaint: Knee pain       HPI: Patient is a 79 yo male with a medical history of hypertension, GERD, hypothyroidism, and hyperlipidemia who was seen and examined by orthopedic surgery for end-stage DJD right knee.This is associated with chronic knee pain that has failed conservative therapies. He underwent a right total knee replacement by Dr. Avila today (1/8/2024). Patient was seen after the surgery. He is afebrible and vital signs signs. Also, pain is controlled and is being managed by the primary team. Hospital medicine was consulted for medical management post surgery        Interval History: Patient was seen and examined in bed with no acute event overnight. He denies fever, chills, headache, chest pain, palpitations, shortness of breath, nausea, vomiting and abdominal pain. Vital signs are stable with /71 and pulse of 93. We will continue to hold home blood pressure medication at discharge. Patient is instructed to check his blood pressure at home and resume blood pressure medication if blood pressure is elevated. He will follow up with PCP for continued medical management post surgery.    Review of Systems   Constitutional:  Positive for activity change. Negative for appetite change, diaphoresis, fatigue and fever.   HENT:  Negative for postnasal drip, sore throat, tinnitus and trouble swallowing.    Eyes:  Negative for discharge and itching.   Respiratory:  Negative for cough, shortness of breath and wheezing.    Cardiovascular:   Negative for chest pain, palpitations and leg swelling.   Gastrointestinal:  Negative for abdominal pain, nausea and vomiting.   Genitourinary:  Negative for decreased urine volume.   Musculoskeletal:  Positive for arthralgias, joint swelling and myalgias. Negative for neck pain and neck stiffness.   Skin:  Negative for wound.   Neurological:  Negative for tremors, seizures and syncope.   Hematological:  Negative for adenopathy.   Psychiatric/Behavioral:  Negative for agitation, behavioral problems and confusion.      Objective:     Vital Signs (Most Recent):  Temp: 98.4 °F (36.9 °C) (01/09/24 0600)  Pulse: 93 (01/09/24 0600)  Resp: 18 (01/09/24 0600)  BP: 114/71 (01/09/24 0600)  SpO2: (!) 93 % (01/09/24 0600) Vital Signs (24h Range):  Temp:  [98.1 °F (36.7 °C)-98.4 °F (36.9 °C)] 98.4 °F (36.9 °C)  Pulse:  [76-94] 93  Resp:  [12-18] 18  SpO2:  [93 %-97 %] 93 %  BP: (100-123)/(56-80) 114/71     Weight: 122.5 kg (270 lb)  Body mass index is 36.62 kg/m².    Intake/Output Summary (Last 24 hours) at 1/9/2024 1020  Last data filed at 1/9/2024 0557  Gross per 24 hour   Intake 1233.8 ml   Output 3020 ml   Net -1786.2 ml         Physical Exam  Vitals and nursing note reviewed.   Constitutional:       General: He is not in acute distress.     Appearance: Normal appearance. He is not ill-appearing.   HENT:      Head: Normocephalic and atraumatic.      Right Ear: External ear normal.      Left Ear: External ear normal.      Nose: Nose normal.   Eyes:      General: No scleral icterus.        Right eye: No discharge.         Left eye: No discharge.      Extraocular Movements: Extraocular movements intact.      Conjunctiva/sclera: Conjunctivae normal.      Pupils: Pupils are equal, round, and reactive to light.   Cardiovascular:      Rate and Rhythm: Normal rate and regular rhythm.      Pulses: Normal pulses.      Heart sounds: Normal heart sounds.   Pulmonary:      Effort: Pulmonary effort is normal.      Breath sounds: Normal  breath sounds. No wheezing, rhonchi or rales.   Abdominal:      General: Bowel sounds are normal.      Tenderness: There is no abdominal tenderness. There is no guarding or rebound.   Musculoskeletal:      Right lower leg: No edema.      Left lower leg: No edema.      Comments: Right knee is neurovascularly intact   Skin:     General: Skin is warm.   Neurological:      General: No focal deficit present.      Mental Status: He is alert and oriented to person, place, and time.   Psychiatric:         Mood and Affect: Mood normal.         Behavior: Behavior normal.         Thought Content: Thought content normal.         Judgment: Judgment normal.             Significant Labs: All pertinent labs within the past 24 hours have been reviewed.    Significant Imaging: I have reviewed all pertinent imaging results/findings within the past 24 hours.  I have reviewed and interpreted all pertinent imaging results/findings within the past 24 hours.  Assessment/Plan:     * DJD (degenerative joint disease) of knee  - Post op day 1, S/P right knee replacement on 1/8/2024  - Continue Cefazolin and Vancomycin as per orthopedic surgery  - Continue IV hydration  - Diludid 0.5 mg and morphine as scheduled by primary team      Essential hypertension  - Blood pressure is borderline after the surgery  - Holding home blood pressure medications  - Monitor blood pressure and resumed home blood pressure medications as indicated    Hold home blood pressure medication at discharge. Patient instructed to check blood pressure at home and resume blood medication if blood pressure is elevated SBP > 130 or DBP > 90. Also, patient will follow up with PCP for continued medical management    Gastroesophageal reflux disease  Order Protonix 40 mg daily but resume home PPI at discharge      Hyperlipidemia  Continue home prevastatin and gemfibrozil        VTE Risk Mitigation (From admission, onward)           Ordered     IP VTE HIGH RISK PATIENT  Once          01/08/24 1105     Place TUCKER hose  Until discontinued         01/08/24 1105                        Thank you for your consult. I will sign off. Please contact us if you have any additional questions.    Stanley Baker MD  Department of Hospital Medicine   [facility]

## 2024-01-09 NOTE — DISCHARGE SUMMARY
Department of Orthopedic Surgery                 DISCHARGE SUMMARY           Patient Name: Diogenes Aguilar  : 1945  Age: 78 y.o.  Sex: male  LOS: 0    ADMISSION INFORMATION:  Admit Date: 2024    DISCHARGE INFORMATION:  Discharge Date/Time:  2024  Discharge Physician: Austin  Discharged Condition: Stable      Admitting Diagnoses:  Primary osteoarthritis of right knee [M17.11]    PROCEDURE:  Procedure(s) (LRB):  ARTHROPLASTY, KNEE, TOTAL, USING COMPUTER-ASSISTED NAVIGATION (Right)        Discharge Diagnoses:  Status post right total knee secondary to DJD    HOSPITAL COURSE:   Diogenes Aguilar  is a 78 y.o. patient underwent right total knee replacement computer navigated please see op report  SURGERY DATE: 2024      PREOPERATIVE DIAGNOSIS: Primary osteoarthritis of right knee [M17.11]     POSTOPERATIVE DIAGNOSIS:  Primary osteoarthritis of right knee [M17.11]     PROCEDURE: right Total Knee Arthroplasty computer navigated  [employer] computer navigation  Depuy implants  Seven posterior stabilized femur  9 tibial tray  6 poly posterior stabilized  41 patella    Postoperative he is done well Drain is pulled thigh and calf were soft he is neurovascularly intact dressings dry  Patient will be seen by Physical therapy today if stable and a walk will be discharged home      Physical Exam for Today :  /71 (BP Location: Right arm, Patient Position: Lying)   Pulse 93   Temp 98.4 °F (36.9 °C)   Resp 18   Ht 6' (1.829 m)   Wt 122.5 kg (270 lb)   SpO2 (!) 93%   BMI 36.62 kg/m²     Alert oriented neurovascularly intact dressing dry we pull the drain neurovascularly intact thigh and calf soft dressing dry      IMAGING: X-Ray Knee 1 or 2 View Right  Narrative: EXAMINATION:  XR KNEE 1 OR 2 VIEW RIGHT    CLINICAL HISTORY:  Postop total knee; Postop total knee;    COMPARISON:  2023    TECHNIQUE:  XR KNEE 2 VIEW RIGHT    FINDINGS:  There has beeninterval right knee  arthroplasty. Arthroplasty components appear normal alignment. No periprosthetic fractures seen. Expected postsurgical changes are present in the adjacent soft tissues.  Impression: Expected postsurgical appearance of right knee post-arthroplasty.    Electronically signed by: Deepak Curiel  Date:    01/08/2024  Time:    12:04        .      DISCHARGE MEDS:     Medication List        START taking these medications      aspirin 325 MG EC tablet  Commonly known as: ECOTRIN  Take 1 tablet (325 mg total) by mouth 2 (two) times a day.     meloxicam 7.5 MG tablet  Commonly known as: MOBIC  Take 1 tablet (7.5 mg total) by mouth once daily.     metaxalone 800 MG tablet  Commonly known as: SKELAXIN  Take 1 tablet (800 mg total) by mouth 3 (three) times daily as needed for Pain.     oxyCODONE-acetaminophen 7.5-325 mg per tablet  Commonly known as: PERCOCET  Take 1 tablet by mouth every 4 (four) hours as needed for Pain.            CONTINUE taking these medications      amLODIPine 5 MG tablet  Commonly known as: NORVASC  Take 1 tablet (5 mg total) by mouth 2 (two) times daily.     gemfibroziL 600 MG tablet  Commonly known as: LOPID  Take 1 tablet (600 mg total) by mouth once daily. With food.     lisinopriL 20 MG tablet  Commonly known as: PRINIVIL,ZESTRIL  Take 1 tablet (20 mg total) by mouth once daily.     omeprazole 20 MG capsule  Commonly known as: PRILOSEC  Take 1 capsule (20 mg total) by mouth once daily.     pravastatin 80 MG tablet  Commonly known as: PRAVACHOL  TAKE ONE TABLET BY MOUTH ONCE DAILY     vitamin D 1000 units Tab  Commonly known as: VITAMIN D3            STOP taking these medications      HYDROcodone-acetaminophen  mg per tablet  Commonly known as: NORCO     HYDROcodone-acetaminophen 7.5-325 mg per tablet  Commonly known as: NORCO     predniSONE 20 MG tablet  Commonly known as: DELTASONE     promethazine 25 MG tablet  Commonly known as: PHENERGAN               Where to Get Your Medications         These medications were sent to [facility] [place] - [address]  [address] [facility] [place] [address]      Phone: [phone]   meloxicam 7.5 MG tablet  metaxalone 800 MG tablet  oxyCODONE-acetaminophen 7.5-325 mg per tablet       Information about where to get these medications is not yet available    Ask your nurse or doctor about these medications  aspirin 325 MG EC tablet         CONSULTS: IP CONSULT TO HOSPITAL MEDICINE  IP CONSULT TO SOCIAL WORK/CASE MANAGEMENT    Follow up:  [unfilled]    DISPOSITION: [place] [facility] [place]    CONDITION: Stable              Bill Avila III  1/9/2024, 8:03 AM        (Subject to voice recognition error, transcription service not allowed)

## 2024-01-09 NOTE — PT/OT/SLP PROGRESS
Occupational Therapy   Treatment    Name: Diogenes Aguilar  MRN: [id number]  Admitting Diagnosis:  DJD (degenerative joint disease) of knee  1 Day Post-Op    Recommendations:     Discharge Recommendations: Low Intensity Therapy  Discharge Equipment Recommendations:   (recommended a reacher)  Barriers to discharge:  None    Assessment:     Diogenes Aguilar is a 78 y.o. male with a medical diagnosis of DJD (degenerative joint disease) of knee.  He presents with complaint of (R) knee pain. Pt agreed to OT treatment. Performance deficits affecting function are impaired self care skills, impaired functional mobility, pain, orthopedic precautions.     Rehab Prognosis:  Good; patient would benefit from acute skilled OT services to address these deficits and reach maximum level of function.       Plan:     Patient to be seen 5 x/week to address the above listed problems via self-care/home management, therapeutic activities, therapeutic exercises  Plan of Care Expires: 02/12/24  Plan of Care Reviewed with: patient, spouse    Subjective     Chief Complaint: (R) TKR  Patient/Family Comments/goals: To return home  Pain/Comfort:  Pain Rating 1: 4/10  Location - Side 1: Right  Location 1: knee  Pain Addressed 1: Reposition, Distraction  Pain Rating Post-Intervention 1: 4/10    Objective:     Communicated with: RICKY Hernadez prior to session.  Patient found up in chair with cryotherapy, peripheral IV upon OT entry to room.    General Precautions: Standard, fall    Orthopedic Precautions:RLE weight bearing as tolerated  Braces: N/A  Respiratory Status: Room air     Occupational Performance:     Bed Mobility:    Pt up in chair     Functional Mobility/Transfers:  Patient completed Sit <> Stand Transfer with minimum assistance  with  gait belt to stand to RW   Functional Mobility: NT    Activities of Daily Living:  Grooming: independence brushing teeth and combing hair  Upper Body Dressing: independence donning pullover shirt  Lower Body  Dressing: pt donned short pants over feet with mod I with use of reacher. Pt educated to edward affected (R) LE first. Pt stood with CGA to edward over hips. Pt donned sock on (L) with sockaid with min a. Pt declined to edward over (R). Pt stated he will have wife to assist him.        [facility] 6 Click ADL:      Treatment & Education:  Pt participated well with tx. Pt was educated regarding adaptive devices for LE dressing. Pt/wife stated she will be able to assist him at home and feel the equipment will cause pt to be frustrated.    Patient left up in chair with all lines intact, call button in reach, and nurse notified    GOALS:   Multidisciplinary Problems       Occupational Therapy Goals          Problem: Occupational Therapy    Goal Priority Disciplines Outcome Interventions   Occupational Therapy Goal     OT, PT/OT Ongoing, Progressing    Description: ST.Pt will perform bathing with min a  2.Pt will perform UE dressing with (I)  3.Pt will perform LE dressing with Min a with AD as needed  4.Pt will transfer bed/chair/bsc with CGA/SBA  5.Pt will perform standing task x 2 min with CGA/SBA  6.Tolerate 15 min of tx without fatigue.      LTG:   Restore to max I with selfcare and mobility.                          Time Tracking:     OT Date of Treatment: 24  OT Start Time: 1040  OT Stop Time: 1103  OT Total Time (min): 23 min    Billable Minutes:Self Care/Home Management 20    OT/SMITH: OT          2024

## 2024-01-10 PROCEDURE — G0180 MD CERTIFICATION HHA PATIENT: HCPCS | Mod: ,,, | Performed by: ORTHOPAEDIC SURGERY

## 2024-01-10 NOTE — ANESTHESIA POSTPROCEDURE EVALUATION
Anesthesia Post Evaluation    Patient: Diogenes Aguilar    Procedure(s) Performed: Procedure(s) (LRB):  ARTHROPLASTY, KNEE, TOTAL, USING COMPUTER-ASSISTED NAVIGATION (Right)    Final Anesthesia Type: general      Patient location during evaluation: PACU  Patient participation: Yes- Able to Participate  Level of consciousness: awake and alert  Post-procedure vital signs: reviewed and stable  Pain management: adequate  Airway patency: patent  MANUELA mitigation strategies: Multimodal analgesia  PONV status at discharge: No PONV  Anesthetic complications: no      Cardiovascular status: blood pressure returned to baseline  Respiratory status: unassisted  Hydration status: euvolemic  Follow-up not needed.              Vitals Value Taken Time   /73 01/08/24 1910   Temp 36.9 °C (98.4 °F) 01/08/24 1910   Pulse 94 01/08/24 1910   Resp 18 01/08/24 1910   SpO2 95 % 01/08/24 1910         Event Time   Out of Recovery 11:55:00         Pain/Paramjit Score: Pain Rating Prior to Med Admin: 6 (1/9/2024 12:55 PM)  Pain Rating Post Med Admin: 3 (1/9/2024 12:37 AM)

## 2024-01-24 ENCOUNTER — DOCUMENT SCAN (OUTPATIENT)
Dept: HOME HEALTH SERVICES | Facility: HOSPITAL | Age: 79
End: 2024-01-24
Payer: MEDICARE

## 2024-01-25 ENCOUNTER — EXTERNAL HOME HEALTH (OUTPATIENT)
Dept: HOME HEALTH SERVICES | Facility: HOSPITAL | Age: 79
End: 2024-01-25
Payer: MEDICARE

## 2024-01-26 ENCOUNTER — OFFICE VISIT (OUTPATIENT)
Dept: ORTHOPEDICS | Facility: CLINIC | Age: 79
End: 2024-01-26
Payer: MEDICARE

## 2024-01-26 DIAGNOSIS — Z09 FOLLOW-UP EXAMINATION, FOLLOWING OTHER SURGERY: Primary | ICD-10-CM

## 2024-01-26 PROCEDURE — 99024 POSTOP FOLLOW-UP VISIT: CPT | Mod: ,,, | Performed by: ORTHOPAEDIC SURGERY

## 2024-01-26 PROCEDURE — 99212 OFFICE O/P EST SF 10 MIN: CPT | Mod: PBBFAC | Performed by: ORTHOPAEDIC SURGERY

## 2024-01-26 NOTE — PROGRESS NOTES
CC:    Chief Complaint   Patient presents with    Follow-up     RT TKR 1/8 - 2 weeks 4 DAYS           Previos History :        History:  1/26/2024   Diogenes Aguilar is a 78 y.o.  status post 2 weeks out right total knee 01/08/2024  Overall he is doing well he has been doing home health he is radius switch to outpatient        PE:   Thigh and calf were soft incision looks good his knee swollen still he is a big man he has getting a 90 he is got basically full extension lacks maybe lacks 1 or 2° he is neurovascularly intact      Radiology:  X-rays none        Ass/Plan:  At this point we will switch to outpatient physical therapy continue the TUCKER stocking he does not need any pain medicine he states check on about 3 or 4 weeks he is on go up at [place]        Bill Avila III, MD    Subject to voice recognition errors,  transcription services are not allowed

## 2024-01-31 ENCOUNTER — EXTERNAL CHRONIC CARE MANAGEMENT (OUTPATIENT)
Dept: PRIMARY CARE CLINIC | Facility: CLINIC | Age: 79
End: 2024-01-31
Payer: MEDICARE

## 2024-01-31 PROCEDURE — G0511 CCM/BHI BY RHC/FQHC 20MIN MO: HCPCS | Mod: ,,, | Performed by: FAMILY MEDICINE

## 2024-02-07 RX ORDER — AZITHROMYCIN 250 MG/1
TABLET, FILM COATED ORAL
Qty: 6 TABLET | Refills: 1 | Status: SHIPPED | OUTPATIENT
Start: 2024-02-07 | End: 2024-02-12

## 2024-02-07 RX ORDER — PREDNISONE 20 MG/1
60 TABLET ORAL DAILY
Qty: 15 TABLET | Refills: 0 | Status: SHIPPED | OUTPATIENT
Start: 2024-02-07

## 2024-02-20 DIAGNOSIS — Z09 FOLLOW-UP EXAMINATION, FOLLOWING OTHER SURGERY: Primary | ICD-10-CM

## 2024-02-22 RX ORDER — OMEPRAZOLE 20 MG/1
20 CAPSULE, DELAYED RELEASE ORAL
Qty: 90 CAPSULE | Refills: 3 | Status: SHIPPED | OUTPATIENT
Start: 2024-02-22

## 2024-02-27 ENCOUNTER — HOSPITAL ENCOUNTER (OUTPATIENT)
Dept: RADIOLOGY | Facility: HOSPITAL | Age: 79
Discharge: HOME | End: 2024-02-27
Attending: ORTHOPAEDIC SURGERY
Payer: MEDICARE

## 2024-02-27 ENCOUNTER — OFFICE VISIT (OUTPATIENT)
Dept: ORTHOPEDICS | Facility: CLINIC | Age: 79
End: 2024-02-27
Payer: MEDICARE

## 2024-02-27 DIAGNOSIS — Z96.651 STATUS POST TOTAL RIGHT KNEE REPLACEMENT: Primary | ICD-10-CM

## 2024-02-27 DIAGNOSIS — Z09 FOLLOW-UP EXAMINATION, FOLLOWING OTHER SURGERY: ICD-10-CM

## 2024-02-27 PROCEDURE — 73560 X-RAY EXAM OF KNEE 1 OR 2: CPT | Mod: 26,RT,, | Performed by: ORTHOPAEDIC SURGERY

## 2024-02-27 PROCEDURE — 99212 OFFICE O/P EST SF 10 MIN: CPT | Mod: PBBFAC,25 | Performed by: ORTHOPAEDIC SURGERY

## 2024-02-27 PROCEDURE — 73560 X-RAY EXAM OF KNEE 1 OR 2: CPT | Mod: TC,RT

## 2024-02-27 PROCEDURE — 99024 POSTOP FOLLOW-UP VISIT: CPT | Mod: ,,, | Performed by: ORTHOPAEDIC SURGERY

## 2024-02-27 NOTE — PROGRESS NOTES
CC:   Chief Complaint   Patient presents with    Right Knee - Post-op Evaluation     RT TKR 1/8 (7WKS)        PREVIOUS INFO:   History:  1/26/2024   Diogenes Aguilar is a 78 y.o.  status post 2 weeks out right total knee 01/08/2024  Overall he is doing well he has been doing home health he is radius switch to outpatient          Right total knee 01/08/2024    HISTORY:   2/27/2024    Diogenes Aguilar  is a 79 y.o. 7 weeks out right total knee 01/08/2024      PAST MEDICAL HISTORY:   Past Medical History:   Diagnosis Date    Carcinoma of prostate     Diverticula, colon 08/19/2021    Essential hypertension     GERD (gastroesophageal reflux disease)     History of colon polyps 08/19/2021    Hyperlipidemia     Obesity     Polyp of ascending colon 08/19/2021          PAST SURGICAL HISTORY:   Past Surgical History:   Procedure Laterality Date    ABDOMINAL SURGERY N/A     blockage of the bowels    ARTHROPLASTY, KNEE, TOTAL, USING COMPUTER-ASSISTED NAVIGATION Right 1/8/2024    Procedure: ARTHROPLASTY, KNEE, TOTAL, USING COMPUTER-ASSISTED NAVIGATION;  Surgeon: Bill Avila III, MD;  Location: [facility] OR;  Service: Orthopedics;  Laterality: Right;    COLONOSCOPY  08/23/2021    repeat 1 year    LAPAROTOMY, EXPLORATORY N/A 2/24/2023    Procedure: LAPAROTOMY, EXPLORATORY;  Surgeon: Kaleigh Beach MD;  Location: [facility] OR;  Service: General;  Laterality: N/A;  mesh removal    PROSTATECTOMY      UMBILICAL HERNIA REPAIR      x 3           ALLERGIES:   Review of patient's allergies indicates:   Allergen Reactions    Opioids - morphine analogues      PT STATES CAN TOLERATE MORPHINE---CAUSES HEADACHE        MEDICATIONS :    Current Outpatient Medications:     amLODIPine (NORVASC) 5 MG tablet, Take 1 tablet (5 mg total) by mouth 2 (two) times daily., Disp: 180 tablet, Rfl: 3    aspirin (ECOTRIN) 325 MG EC tablet, Take 1 tablet (325 mg total) by mouth 2 (two) times a day., Disp: , Rfl: 0    gemfibroziL (LOPID) 600 MG  tablet, Take 1 tablet (600 mg total) by mouth once daily. With food., Disp: 90 tablet, Rfl: 3    lisinopriL (PRINIVIL,ZESTRIL) 20 MG tablet, Take 1 tablet (20 mg total) by mouth once daily., Disp: 90 tablet, Rfl: 3    meloxicam (MOBIC) 7.5 MG tablet, Take 1 tablet (7.5 mg total) by mouth once daily., Disp: 30 tablet, Rfl: 1    omeprazole (PRILOSEC) 20 MG capsule, TAKE ONE CAPSULE BY MOUTH ONCE DAILY, Disp: 90 capsule, Rfl: 3    oxyCODONE-acetaminophen (PERCOCET) 7.5-325 mg per tablet, Take 1 tablet by mouth every 4 (four) hours as needed for Pain., Disp: 20 tablet, Rfl: 0    pravastatin (PRAVACHOL) 80 MG tablet, TAKE ONE TABLET BY MOUTH ONCE DAILY, Disp: 90 tablet, Rfl: 3    predniSONE (DELTASONE) 20 MG tablet, Take 3 tablets (60 mg total) by mouth once daily., Disp: 15 tablet, Rfl: 0    vitamin D (VITAMIN D3) 1000 units Tab, Take 2,500 Units by mouth once daily., Disp: , Rfl:      SOCIAL HISTORY:   Social History     Socioeconomic History    Marital status:    Tobacco Use    Smoking status: Never    Smokeless tobacco: Never   Substance and Sexual Activity    Alcohol use: Yes     Comment: on occasion    Drug use: Never    Sexual activity: Not Currently     Social Determinants of Health     Financial Resource Strain: Low Risk  (1/8/2024)    Overall Financial Resource Strain (CARDIA)     Difficulty of Paying Living Expenses: Not hard at all   Food Insecurity: No Food Insecurity (1/8/2024)    Hunger Vital Sign     Worried About Running Out of Food in the Last Year: Never true     Ran Out of Food in the Last Year: Never true   Transportation Needs: No Transportation Needs (1/8/2024)    PRAPARE - Transportation     Lack of Transportation (Medical): No     Lack of Transportation (Non-Medical): No   Physical Activity: Inactive (1/8/2024)    Exercise Vital Sign     Days of Exercise per Week: 0 days     Minutes of Exercise per Session: 0 min   Stress: No Stress Concern Present (1/8/2024)    [facility] of  Occupational Health - Occupational Stress Questionnaire     Feeling of Stress : Not at all   Social Connections: Moderately Isolated (2024)    Social Connection and Isolation Panel [NHANES]     Frequency of Communication with Friends and Family: Three times a week     Frequency of Social Gatherings with Friends and Family: Three times a week     Attends Alevism Services: Never     Active Member of Clubs or Organizations: No     Attends Club or Organization Meetings: Never     Marital Status:    Housing Stability: Low Risk  (2024)    Housing Stability Vital Sign     Unable to Pay for Housing in the Last Year: No     Number of Places Lived in the Last Year: 1     Unstable Housing in the Last Year: No        ROS    FAMILY HISTORY:   Family History   Problem Relation Age of Onset    Breast cancer Mother          at age 49 from breast CA with mets          PHYSICAL EXAM: There were no vitals filed for this visit.            There is no height or weight on file to calculate BMI.     In general, this is a well-developed, well-nourished male . The patient is alert, oriented and cooperative.      HEENT:  Normocephalic, atraumatic.  Extraocular movements are intact bilaterally.  The oropharynx is benign.       NECK:  Nontender with good range of motion.      PULMONARY: Respirations are even and non-labored.       CARDIOVASCULAR: Pulses regular by peripheral palpation.       ABDOMEN:  Soft, non-tender, non-distended.        EXTREMITIES:  Knee looks good he has got a big leg but it is incision looks fine is not hot or red he has been in it to about 110 maybe 120 he is got full extension stable to varus and valgus thigh and calf were soft    Ortho Exam      RADIOGRAPHIC FINDINGS:  Right knee two views AP and lateral view cemented total knee prosthesis good alignment no evidence of loosening no fracture dislocation appreciated      .      IMPRESSION: Status post total right knee replacement         PLAN:   Doing well total knee replacement he is on continue therapy for a few weeks will check on about 3 months  There are no Patient Instructions on file for this visit.      No follow-ups on file.         Bill Avila III      (Subject to voice recognition error, transcription service not allowed)

## 2024-02-29 ENCOUNTER — EXTERNAL CHRONIC CARE MANAGEMENT (OUTPATIENT)
Dept: PRIMARY CARE CLINIC | Facility: CLINIC | Age: 79
End: 2024-02-29
Payer: MEDICARE

## 2024-02-29 PROCEDURE — G0511 CCM/BHI BY RHC/FQHC 20MIN MO: HCPCS | Mod: ,,, | Performed by: FAMILY MEDICINE

## 2024-03-26 RX ORDER — GEMFIBROZIL 600 MG/1
TABLET, FILM COATED ORAL
Qty: 90 TABLET | Refills: 3 | Status: SHIPPED | OUTPATIENT
Start: 2024-03-26

## 2024-03-31 ENCOUNTER — EXTERNAL CHRONIC CARE MANAGEMENT (OUTPATIENT)
Dept: PRIMARY CARE CLINIC | Facility: CLINIC | Age: 79
End: 2024-03-31
Payer: MEDICARE

## 2024-03-31 PROCEDURE — G0511 CCM/BHI BY RHC/FQHC 20MIN MO: HCPCS | Mod: ,,, | Performed by: FAMILY MEDICINE

## 2024-04-01 RX ORDER — AMLODIPINE BESYLATE 5 MG/1
5 TABLET ORAL 2 TIMES DAILY
Qty: 180 TABLET | Refills: 3 | Status: SHIPPED | OUTPATIENT
Start: 2024-04-01

## 2024-04-01 RX ORDER — LISINOPRIL 20 MG/1
20 TABLET ORAL DAILY
Qty: 90 TABLET | Refills: 3 | Status: SHIPPED | OUTPATIENT
Start: 2024-04-01

## 2024-04-30 ENCOUNTER — EXTERNAL CHRONIC CARE MANAGEMENT (OUTPATIENT)
Dept: PRIMARY CARE CLINIC | Facility: CLINIC | Age: 79
End: 2024-04-30
Payer: MEDICARE

## 2024-04-30 PROCEDURE — G0511 CCM/BHI BY RHC/FQHC 20MIN MO: HCPCS | Mod: ,,, | Performed by: FAMILY MEDICINE

## 2024-05-28 ENCOUNTER — OFFICE VISIT (OUTPATIENT)
Dept: ORTHOPEDICS | Facility: CLINIC | Age: 79
End: 2024-05-28
Payer: MEDICARE

## 2024-05-28 DIAGNOSIS — Z09 FOLLOW-UP EXAMINATION, FOLLOWING OTHER SURGERY: Primary | ICD-10-CM

## 2024-05-28 PROCEDURE — 99213 OFFICE O/P EST LOW 20 MIN: CPT | Mod: PBBFAC | Performed by: ORTHOPAEDIC SURGERY

## 2024-05-28 PROCEDURE — 99212 OFFICE O/P EST SF 10 MIN: CPT | Mod: S$PBB,,, | Performed by: ORTHOPAEDIC SURGERY

## 2024-05-28 NOTE — PROGRESS NOTES
CC:   Chief Complaint   Patient presents with    Right Knee - Post-op Evaluation     RT TKR 1/8- 5 MONTHS        PREVIOUS INFO:        HISTORY:   5/28/2024    Diogenes Aguilar  is a 79 y.o. status post right total knee 01/08/2024 5 months out he is  pleased using a cane because of his left ankle he has bad a valgus deformity of the      PAST MEDICAL HISTORY:   Past Medical History:   Diagnosis Date    Carcinoma of prostate     Diverticula, colon 08/19/2021    Essential hypertension     GERD (gastroesophageal reflux disease)     History of colon polyps 08/19/2021    Hyperlipidemia     Obesity     Polyp of ascending colon 08/19/2021          PAST SURGICAL HISTORY:   Past Surgical History:   Procedure Laterality Date    ABDOMINAL SURGERY N/A     blockage of the bowels    ARTHROPLASTY, KNEE, TOTAL, USING COMPUTER-ASSISTED NAVIGATION Right 1/8/2024    Procedure: ARTHROPLASTY, KNEE, TOTAL, USING COMPUTER-ASSISTED NAVIGATION;  Surgeon: Bill Avila III, MD;  Location: [facility] OR;  Service: Orthopedics;  Laterality: Right;    COLONOSCOPY  08/23/2021    repeat 1 year    LAPAROTOMY, EXPLORATORY N/A 2/24/2023    Procedure: LAPAROTOMY, EXPLORATORY;  Surgeon: Kaleigh Beach MD;  [facility]: [facility] OR;  Service: General;  Laterality: N/A;  mesh removal    PROSTATECTOMY      UMBILICAL HERNIA REPAIR      x 3           ALLERGIES:   Review of patient's allergies indicates:   Allergen Reactions    Opioids - morphine analogues      PT STATES CAN TOLERATE MORPHINE---CAUSES HEADACHE        MEDICATIONS :    Current Outpatient Medications:     amLODIPine (NORVASC) 5 MG tablet, Take 1 tablet (5 mg total) by mouth 2 (two) times daily., Disp: 180 tablet, Rfl: 3    aspirin (ECOTRIN) 325 MG EC tablet, Take 1 tablet (325 mg total) by mouth 2 (two) times a day., Disp: , Rfl: 0    gemfibroziL (LOPID) 600 MG tablet, TAKE ONE TABLET BY MOUTH ONCE DAILY WITH FOOD, Disp: 90 tablet, Rfl: 3    lisinopriL (PRINIVIL,ZESTRIL) 20 MG  tablet, Take 1 tablet (20 mg total) by mouth once daily., Disp: 90 tablet, Rfl: 3    meloxicam (MOBIC) 7.5 MG tablet, Take 1 tablet (7.5 mg total) by mouth once daily., Disp: 30 tablet, Rfl: 1    omeprazole (PRILOSEC) 20 MG capsule, TAKE ONE CAPSULE BY MOUTH ONCE DAILY, Disp: 90 capsule, Rfl: 3    oxyCODONE-acetaminophen (PERCOCET) 7.5-325 mg per tablet, Take 1 tablet by mouth every 4 (four) hours as needed for Pain., Disp: 20 tablet, Rfl: 0    pravastatin (PRAVACHOL) 80 MG tablet, TAKE ONE TABLET BY MOUTH ONCE DAILY, Disp: 90 tablet, Rfl: 3    predniSONE (DELTASONE) 20 MG tablet, Take 3 tablets (60 mg total) by mouth once daily., Disp: 15 tablet, Rfl: 0    vitamin D (VITAMIN D3) 1000 units Tab, Take 2,500 Units by mouth once daily., Disp: , Rfl:      SOCIAL HISTORY:   Social History     Socioeconomic History    Marital status:    Tobacco Use    Smoking status: Never    Smokeless tobacco: Never   Substance and Sexual Activity    Alcohol use: Yes     Comment: on occasion    Drug use: Never    Sexual activity: Not Currently     Social Determinants of Health     Financial Resource Strain: Low Risk  (1/8/2024)    Overall Financial Resource Strain (CARDIA)     Difficulty of Paying Living Expenses: Not hard at all   Food Insecurity: No Food Insecurity (1/8/2024)    Hunger Vital Sign     Worried About Running Out of Food in the Last Year: Never true     Ran Out of Food in the Last Year: Never true   Transportation Needs: No Transportation Needs (1/8/2024)    PRAPARE - Transportation     Lack of Transportation (Medical): No     Lack of Transportation (Non-Medical): No   Physical Activity: Inactive (1/8/2024)    Exercise Vital Sign     Days of Exercise per Week: 0 days     Minutes of Exercise per Session: 0 min   Stress: No Stress Concern Present (1/8/2024)    Citizen of [place] of Occupational Health - Occupational Stress Questionnaire     Feeling of Stress : Not at all   Housing Stability: Low Risk  (1/8/2024)     Housing Stability Vital Sign     Unable to Pay for Housing in the Last Year: No     Number of Places Lived in the Last Year: 1     Unstable Housing in the Last Year: No        ROS    FAMILY HISTORY:   Family History   Problem Relation Name Age of Onset    Breast cancer Mother           at age 49 from breast CA with mets          PHYSICAL EXAM: There were no vitals filed for this visit.            There is no height or weight on file to calculate BMI.     In general, this is a well-developed, well-nourished male . The patient is alert, oriented and cooperative.      HEENT:  Normocephalic, atraumatic.  Extraocular movements are intact bilaterally.  The oropharynx is benign.       NECK:  Nontender with good range of motion.      PULMONARY: Respirations are even and non-labored.       CARDIOVASCULAR: Pulses regular by peripheral palpation.       ABDOMEN:  Soft, non-tender, non-distended.        EXTREMITIES:  Knees got full extension flexion about 110 there is some thickening around the kneecap but no pain    Ortho Exam      RADIOGRAPHIC FINDINGS:  None today      .      IMPRESSION:  He is pleased right total knee    PLAN:  Follow him up in a year from his surgery with x-rays right knee        No follow-ups on file.         Bill Avila III      (Subject to voice recognition error, transcription service not allowed)

## 2024-05-31 ENCOUNTER — EXTERNAL CHRONIC CARE MANAGEMENT (OUTPATIENT)
Dept: PRIMARY CARE CLINIC | Facility: CLINIC | Age: 79
End: 2024-05-31
Payer: MEDICARE

## 2024-05-31 PROCEDURE — G0511 CCM/BHI BY RHC/FQHC 20MIN MO: HCPCS | Mod: ,,, | Performed by: FAMILY MEDICINE

## 2024-06-03 ENCOUNTER — CLINICAL SUPPORT (OUTPATIENT)
Dept: CARDIOLOGY | Facility: CLINIC | Age: 79
End: 2024-06-03
Attending: SURGERY
Payer: MEDICARE

## 2024-06-03 ENCOUNTER — OFFICE VISIT (OUTPATIENT)
Dept: VASCULAR SURGERY | Facility: CLINIC | Age: 79
End: 2024-06-03
Payer: MEDICARE

## 2024-06-03 ENCOUNTER — OFFICE VISIT (OUTPATIENT)
Dept: SURGERY | Facility: CLINIC | Age: 79
End: 2024-06-03
Attending: SURGERY
Payer: MEDICARE

## 2024-06-03 VITALS — HEIGHT: 72 IN | WEIGHT: 270.06 LBS | BODY MASS INDEX: 36.58 KG/M2

## 2024-06-03 DIAGNOSIS — K43.9 VENTRAL HERNIA WITHOUT OBSTRUCTION OR GANGRENE: Primary | ICD-10-CM

## 2024-06-03 DIAGNOSIS — Z01.818 PRE-PROCEDURAL EXAMINATION: ICD-10-CM

## 2024-06-03 PROCEDURE — 99213 OFFICE O/P EST LOW 20 MIN: CPT | Mod: PBBFAC | Performed by: NURSE PRACTITIONER

## 2024-06-03 PROCEDURE — 99212 OFFICE O/P EST SF 10 MIN: CPT | Mod: PBBFAC,25

## 2024-06-03 PROCEDURE — 93010 ELECTROCARDIOGRAM REPORT: CPT | Mod: S$PBB,,, | Performed by: STUDENT IN AN ORGANIZED HEALTH CARE EDUCATION/TRAINING PROGRAM

## 2024-06-03 PROCEDURE — 99214 OFFICE O/P EST MOD 30 MIN: CPT | Mod: PBBFAC | Performed by: SURGERY

## 2024-06-03 PROCEDURE — 99214 OFFICE O/P EST MOD 30 MIN: CPT | Mod: S$PBB,,, | Performed by: NURSE PRACTITIONER

## 2024-06-03 PROCEDURE — 93005 ELECTROCARDIOGRAM TRACING: CPT | Mod: PBBFAC | Performed by: STUDENT IN AN ORGANIZED HEALTH CARE EDUCATION/TRAINING PROGRAM

## 2024-06-03 RX ORDER — CEFAZOLIN SODIUM 2 G/50ML
2 SOLUTION INTRAVENOUS
Status: CANCELLED | OUTPATIENT
Start: 2024-06-03

## 2024-06-03 RX ORDER — SODIUM CHLORIDE 9 MG/ML
INJECTION, SOLUTION INTRAVENOUS CONTINUOUS
Status: CANCELLED | OUTPATIENT
Start: 2024-06-03

## 2024-06-03 RX ORDER — ONDANSETRON 4 MG/1
8 TABLET, ORALLY DISINTEGRATING ORAL EVERY 8 HOURS PRN
Status: CANCELLED | OUTPATIENT
Start: 2024-06-03

## 2024-06-03 NOTE — PROGRESS NOTES
Subjective:       Patient ID: Diogenes Aguilar is a 79 y.o. male.    Chief Complaint: Abdominal Pain    wo week follow-up ex lap to relieve small-bowel obstruction excision of abdominal mesh, having daily bowel movements tolerating diet no complaints    06/03/2024  abdominal pain discomfort reducible small ventral hernia repair previous ex lap with excision of mesh February 2023 by Dr. Beach reports some constipation takes milk of magnesia twice a week, no nausea vomiting appetite is good past medical history includes hypertension total knee replacement by Dr. Avila January of 2024  Past Medical History:   Diagnosis Date    Carcinoma of prostate     Diverticula, colon 08/19/2021    Essential hypertension     GERD (gastroesophageal reflux disease)     History of colon polyps 08/19/2021    Hyperlipidemia     Obesity     Polyp of ascending colon 08/19/2021      Past Surgical History:   Procedure Laterality Date    ABDOMINAL SURGERY N/A     blockage of the bowels    ARTHROPLASTY, KNEE, TOTAL, USING COMPUTER-ASSISTED NAVIGATION Right 1/8/2024    Procedure: ARTHROPLASTY, KNEE, TOTAL, USING COMPUTER-ASSISTED NAVIGATION;  Surgeon: Bill Avila III, MD;  Location: [facility];  Service: Orthopedics;  Laterality: Right;    COLONOSCOPY  08/23/2021    repeat 1 year    LAPAROTOMY, EXPLORATORY N/A 2/24/2023    Procedure: LAPAROTOMY, EXPLORATORY;  Surgeon: Kaleigh Beach MD;  Location: [facility];  Service: General;  Laterality: N/A;  mesh removal    PROSTATECTOMY      UMBILICAL HERNIA REPAIR      x 3        reports that he has never smoked. He has never used smokeless tobacco. He reports current alcohol use. He reports that he does not use drugs.   Abdominal Pain  Associated symptoms include constipation. Pertinent negatives include no diarrhea or nausea.     Review of Systems   Gastrointestinal:  Positive for abdominal pain, change in bowel habit and constipation. Negative for diarrhea and nausea.        Bloating      Integumentary:  Negative for wound.         Objective:      Ht 6' (1.829 m)   Wt 122.5 kg (270 lb 1 oz)   BMI 36.63 kg/m²    Physical Exam  Vitals and nursing note reviewed.   Constitutional:       Appearance: Normal appearance.   HENT:      Head: Normocephalic.      Mouth/Throat:      Mouth: Mucous membranes are moist.   Eyes:      Conjunctiva/sclera: Conjunctivae normal.   Cardiovascular:      Rate and Rhythm: Normal rate and regular rhythm.   Pulmonary:      Effort: Pulmonary effort is normal.   Abdominal:      Palpations: Abdomen is soft.      Hernia: A hernia is present.      Comments: Reducible small ventral hernia recurrent  Midline abdominal incsision   Skin:     General: Skin is warm and dry.   Neurological:      Mental Status: He is alert and oriented to person, place, and time.   Psychiatric:         Mood and Affect: Mood normal.           Assessment:       1. Ventral hernia without obstruction or gangrene        Plan:       Refer to dr Mcmahon for consideration robotic assisted ventral hernia repair

## 2024-06-03 NOTE — PATIENT INSTRUCTIONS
[facility]      Your surgery is scheduled for Monday Margaret 10 th 2024 at [facility] on the ground floor of the Ambulatory building. You should arrive at 8 am at the Ambulatory Care Center located at [address].                                                                                                                                                                                                                                                                                                                                                           Preoperative Instructions        Your pre-op lab work will be on today on the 1st floor of the [facility] building.  EKG is located on 2nd floor of [facility].                                                                                                                                             Day of Surgery Instructions      Bring a list of all your medications with you the day of your surgery. You can also give the list to your doctor or nurse during your final clinic appointment before surgery.      Do not eat any solid foods or drink any liquids after 12:00 AM (midnight). This includes gum, hard candy, mints, and chewing tobacco.  Medications: Take any medications specified with a small sip of water the morning of your surgery.  Brush your teeth: You may brush your teeth and rinse your mouth. Do not swallow any water or toothpaste.  Clothing: A button front shirt and loose-fitting clothes are the most comfortable before and after surgery. We also recommend low-heeled shoes.  Hair: Avoid buns, ponytails, or hairpieces at the back of the head. Remove or avoid any clips, pins or bands that bind hair. Do not use hairspray. Before going to surgery, you will need to remove any wigs or hairpieces.  We will cover your hair during surgery. Your privacy regarding personal appearance will be respected.  Fingernails:  Please be sure to remove all nail polish before you arrive for surgery. We understand that tips, wraps, gels, etc., are expensive; however, we ask these products to be removed from at least one finger on each hand. Your fingertips are used to accurately monitor your oxygen level during surgery by a device called an oximeter.  Glasses and Contact Lenses: Wear glasses when possible. If contact lenses must be worn, bring a lens case and solution. If glasses are worn, bring a case for them.  Hearing Aids: If you rely on a hearing aid, wear it to the hospital on the day of surgery. This will ensure you can hear and understand everything we need to communicate with you.  Valuables: Jewelry, including body piercings, Dentures, money, and credit cards should be left at home. [facility] is not responsible for valuables that are not secured in our surgery center.  Makeup, Perfume, Creams, Lotions and Deodorants: Do not use any of these products on the day of surgery. Remove false eyelashes prior to surgery.  Implanted Medical Devices: If you have an implanted device, such as a pacemaker or AICD, bring the device information card (if you have it) with you.  Medical Equipment: If you have been fitted for a brace to wear after surgery or you have been given crutches, bring those with you to the surgery center.  Shower: Take a shower with Hibiclens® (chlorhexidine) (available over the counter). This reduces the chance of infection. PLEASE USE CHLORHEXIDINE WASH THE NIGHT BEFORE SURGERY AND THE MORNING OF SURGERY.      Medication instructions:  You may take blood pressure medication with a small drink of water the morning of surgery.      IF YOU ARE ON ANY OF THESE BLOOD THINNERS, MAKE SURE YOUR PHYSICIAN IS AWARE.  Eliquis/Apixaban            Wafarin/Coumadin,Jantoven  Xarelto/Rivaroxaban      Pletal/Cilostazol  Plavix/Clopidogrel          Pradaxa/Dibigatran      If you are diabetic      Follow the diabetic medicine instructions you  received during your pre-operative visit.  DO NOT take your insulin or diabetic medications the morning of surgery.  When you arrive at the surgical center, be sure to tell the nurse you are diabetic.    The following blood sugar medications have to be stopped prior to surgery:    Hold 24 hours prior to surgery:    Libraglutide - Saxenda, Victoza  Lixisenatide --Adlxyin  Exenatide  --  Byetta  Empaglifozin--Jardiance  Sitaglitin--Januvia    Hold 1 week prior to surgery:    Semaglutide - Ozempic, Wegouy, Rybelsus  Dulaglutide - Trulicity  Tirzepatide - Mounjaro  Exenatide (extended release inj)-- Bydureon BCise      Hold 48 hours prior to surgery:    Metformin, Glucovance, Metaglip, Fortamet, Glucophage, Riomet, Avandamet, Glimepiride            Other Items to bring with you and know      Insurance card  Identification card such as 's license, passport, or other picture ID  Copy of your advance directives  List of medications and allergies, if not already provided  Name and phone number of person to contact if your condition changes significantly. YOU CANNOT DRIVE YOURSELF HOME FROM THE HOSPITAL THE DAY OF SURGERY.  PLEASE UNDERSTAND THAT OUR OFFICE DOES NOT GIVE PATHOLOGY RESULTS OR TEST RESULTS OVER THE PHONE. THIS WILL BE DISCUSSED WITH YOU ON YOUR FOLLOW UP APPOINTMENT.          Alcohol and Surgery  We want to help you prepare for and recover from surgery as quickly and safely as possible. Be open and honest with your provider about how many drinks you have per day. Excessive alcohol use is defined as drinking more than three drinks per day. It can affect the outcome of your surgery. Binge drinking (consuming large amounts of alcohol infrequently, such as on weekends) can also affect the outcome of your surgery.  Alcohol withdrawal  If you drink more than three drinks a day, you could have a complication, called alcohol withdrawal, after surgery.  Alcohol withdrawal is a set of symptoms that people have  when they suddenly stop drinking after using alcohol  for a long time. During withdrawal, a person's central nervous system overreacts. This can cause mild symptoms such as shakiness, sweating or hallucinating. It can also cause other more serious side effects. If not treated properly, alcohol withdrawal can cause potentially life-threatening complications after surgery. This can include tremors, seizures, hallucinations, delirium tremors, and even death. Untreated alcohol withdrawal often leads to a longer stay in the hospital, potentially in the Intensive Care Unit.  Chronic heavy drinking also can interfere with several organ systems and biochemical processes in the body.  This interference can cause serious, even life-threatening complications.  Your care team can offer alcohol withdrawal treatment to help:  Decrease the risk of seizures and delirium tremors after surgery  Decrease the risk we will need to restrain you for your own safety or the safety of others  Decrease your risk of falling after surgery  Reduce the use of potent sedative medications  Reduce the time you stay in the hospital after surgery  Reduce the time you might spend on a mechanical ventilator to help you breathe  Lower incidence of organ failure and biochemical complications  Talk to a member of your care team or your primary care physician about your alcohol use if you feel you may be at risk of any of these complications.        Smoking and Surgery  Quitting smoking is extremely important for a successful surgery and recovery. Cigarette smoking compromises your immune system. This increases your risk of an infection after surgery. Quitting the habit before surgery will decrease the surgical risks associated with smoking.      FMLA FORMS NEED TO BE SENT TO MAZIN SAUL (JOY), OUR . SHE CAN BE REACHED [phone] .FAX # [phone].  HER EMAIL IS, [email or web address]  LAKSHMI,THE NURSE, CAN BE REACHED BY  calling,[phone] OR THROUGH THE [facility] APP.

## 2024-06-03 NOTE — H&P (VIEW-ONLY)
General Surgery History and Physical      Patient ID: Diogenes Aguilar is a 79 y.o. male.    Chief Complaint: No chief complaint on file.      HPI:  79-year-old male with a long abdominal history.  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  At the time he had a closure with mesh and subsequently had a recurrence of the hernia required a new mesh placement.  Afterwards he had adhesions at the mesh and required an additional surgery.  He believes all the mesh was removed and subsequently the hernia has come back at the superior portion of his midline incision.  He gets occasional bloating nausea and discomfort sometimes with eating.  He feels the bulge has always reducible but it is causing him this discomfort would like to get it fixed.    Review of Systems   Constitutional:  Negative for activity change, appetite change, fatigue and fever.   HENT:  Negative for trouble swallowing.    Respiratory:  Negative for cough and shortness of breath.    Cardiovascular:  Negative for chest pain and palpitations.   Gastrointestinal:  Positive for abdominal pain and nausea. Negative for abdominal distention, blood in stool, constipation and diarrhea.   Genitourinary:  Negative for flank pain.   Musculoskeletal:  Negative for neck pain and neck stiffness.   Neurological:  Negative for weakness.       Current Outpatient Medications   Medication Sig Dispense Refill    amLODIPine (NORVASC) 5 MG tablet Take 1 tablet (5 mg total) by mouth 2 (two) times daily. 180 tablet 3    aspirin (ECOTRIN) 325 MG EC tablet Take 1 tablet (325 mg total) by mouth 2 (two) times a day.  0    gemfibroziL (LOPID) 600 MG tablet TAKE ONE TABLET BY MOUTH ONCE DAILY WITH FOOD 90 tablet 3    lisinopriL (PRINIVIL,ZESTRIL) 20 MG tablet Take 1 tablet (20 mg total) by mouth once  daily. 90 tablet 3    meloxicam (MOBIC) 7.5 MG tablet Take 1 tablet (7.5 mg total) by mouth once daily. 30 tablet 1    omeprazole (PRILOSEC) 20 MG capsule TAKE ONE CAPSULE BY MOUTH ONCE DAILY 90 capsule 3    oxyCODONE-acetaminophen (PERCOCET) 7.5-325 mg per tablet Take 1 tablet by mouth every 4 (four) hours as needed for Pain. 20 tablet 0    pravastatin (PRAVACHOL) 80 MG tablet TAKE ONE TABLET BY MOUTH ONCE DAILY 90 tablet 3    predniSONE (DELTASONE) 20 MG tablet Take 3 tablets (60 mg total) by mouth once daily. 15 tablet 0    vitamin D (VITAMIN D3) 1000 units Tab Take 2,500 Units by mouth once daily.       No current facility-administered medications for this visit.       Review of patient's allergies indicates:   Allergen Reactions    Opioids - morphine analogues      PT STATES CAN TOLERATE MORPHINE---CAUSES HEADACHE       Past Medical History:   Diagnosis Date    Carcinoma of prostate     Diverticula, colon 2021    Essential hypertension     GERD (gastroesophageal reflux disease)     History of colon polyps 2021    Hyperlipidemia     Obesity     Polyp of ascending colon 2021       Past Surgical History:   Procedure Laterality Date    ABDOMINAL SURGERY N/A     blockage of the bowels    ARTHROPLASTY, KNEE, TOTAL, USING COMPUTER-ASSISTED NAVIGATION Right 2024    Procedure: ARTHROPLASTY, KNEE, TOTAL, USING COMPUTER-ASSISTED NAVIGATION;  Surgeon: Bill Avila III, MD;  Location: [facility] OR;  Service: Orthopedics;  Laterality: Right;    COLONOSCOPY  2021    repeat 1 year    LAPAROTOMY, EXPLORATORY N/A 2023    Procedure: LAPAROTOMY, EXPLORATORY;  Surgeon: Kaleigh Beach MD;  Location: [facility] OR;  Service: General;  Laterality: N/A;  mesh removal    PROSTATECTOMY      UMBILICAL HERNIA REPAIR      x 3        Family History   Problem Relation Name Age of Onset    Breast cancer Mother           at age 49 from breast CA with mets       Social History     Socioeconomic  History    Marital status:    Tobacco Use    Smoking status: Never    Smokeless tobacco: Never   Substance and Sexual Activity    Alcohol use: Yes     Comment: on occasion    Drug use: Never    Sexual activity: Not Currently     Social Determinants of Health     Financial Resource Strain: Low Risk  (1/8/2024)    Overall Financial Resource Strain (CARDIA)     Difficulty of Paying Living Expenses: Not hard at all   Food Insecurity: No Food Insecurity (1/8/2024)    Hunger Vital Sign     Worried About Running Out of Food in the Last Year: Never true     Ran Out of Food in the Last Year: Never true   Transportation Needs: No Transportation Needs (1/8/2024)    PRAPARE - Transportation     Lack of Transportation (Medical): No     Lack of Transportation (Non-Medical): No   Physical Activity: Inactive (1/8/2024)    Exercise Vital Sign     Days of Exercise per Week: 0 days     Minutes of Exercise per Session: 0 min   Stress: No Stress Concern Present (1/8/2024)    [employer] [place] [employer] - Occupational Stress Questionnaire     Feeling of Stress : Not at all   Housing Stability: Low Risk  (1/8/2024)    Housing Stability Vital Sign     Unable to Pay for Housing in the Last Year: No     Number of Places Lived in the Last Year: 1     Unstable Housing in the Last Year: No       There were no vitals filed for this visit.    Physical Exam  Constitutional:       General: He is not in acute distress.  HENT:      Head: Normocephalic.   Cardiovascular:      Rate and Rhythm: Normal rate and regular rhythm.      Pulses: Normal pulses.   Pulmonary:      Effort: Pulmonary effort is normal. No respiratory distress.      Breath sounds: Normal breath sounds.   Abdominal:      General: Abdomen is flat. There is no distension.      Palpations: Abdomen is soft.      Tenderness: There is no abdominal tenderness.      Hernia: A hernia (4 cm defect at the upper part of the incision reducible) is present.   Musculoskeletal:          General: Normal range of motion.   Skin:     General: Skin is warm.   Neurological:      General: No focal deficit present.      Mental Status: He is oriented to person, place, and time.         Assessment & Plan:    Ventral hernia without obstruction or gangrene    Pre-procedural examination  -     EKG 12-lead; Future  -     CBC Auto Differential; Future; Expected date: 06/03/2024  -     Basic Metabolic Panel; Future; Expected date: 06/03/2024        Patient to go to the OR in one-week time for a robotic versus open ventral hernia repair.  Risks and benefits explained to the patient clear risk of bleeding, infection, open operation, injury to surrounding organs, recurrence of the hernia, use of the mesh in his associated issues including but not limited to bleeding, erosion, migration.  All questions were answered.

## 2024-06-03 NOTE — PROGRESS NOTES
General Surgery History and Physical      Patient ID: Diogenes Aguilar is a 79 y.o. male.    Chief Complaint: No chief complaint on file.      HPI:  79-year-old male with a long abdominal history.  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  At the time he had a closure with mesh and subsequently had a recurrence of the hernia required a new mesh placement.  Afterwards he had adhesions at the mesh and required an additional surgery.  He believes all the mesh was removed and subsequently the hernia has come back at the superior portion of his midline incision.  He gets occasional bloating nausea and discomfort sometimes with eating.  He feels the bulge has always reducible but it is causing him this discomfort would like to get it fixed.    Review of Systems   Constitutional:  Negative for activity change, appetite change, fatigue and fever.   HENT:  Negative for trouble swallowing.    Respiratory:  Negative for cough and shortness of breath.    Cardiovascular:  Negative for chest pain and palpitations.   Gastrointestinal:  Positive for abdominal pain and nausea. Negative for abdominal distention, blood in stool, constipation and diarrhea.   Genitourinary:  Negative for flank pain.   Musculoskeletal:  Negative for neck pain and neck stiffness.   Neurological:  Negative for weakness.       Current Outpatient Medications   Medication Sig Dispense Refill    amLODIPine (NORVASC) 5 MG tablet Take 1 tablet (5 mg total) by mouth 2 (two) times daily. 180 tablet 3    aspirin (ECOTRIN) 325 MG EC tablet Take 1 tablet (325 mg total) by mouth 2 (two) times a day.  0    gemfibroziL (LOPID) 600 MG tablet TAKE ONE TABLET BY MOUTH ONCE DAILY WITH FOOD 90 tablet 3    lisinopriL (PRINIVIL,ZESTRIL) 20 MG tablet Take 1 tablet (20 mg total) by mouth once  daily. 90 tablet 3    meloxicam (MOBIC) 7.5 MG tablet Take 1 tablet (7.5 mg total) by mouth once daily. 30 tablet 1    omeprazole (PRILOSEC) 20 MG capsule TAKE ONE CAPSULE BY MOUTH ONCE DAILY 90 capsule 3    oxyCODONE-acetaminophen (PERCOCET) 7.5-325 mg per tablet Take 1 tablet by mouth every 4 (four) hours as needed for Pain. 20 tablet 0    pravastatin (PRAVACHOL) 80 MG tablet TAKE ONE TABLET BY MOUTH ONCE DAILY 90 tablet 3    predniSONE (DELTASONE) 20 MG tablet Take 3 tablets (60 mg total) by mouth once daily. 15 tablet 0    vitamin D (VITAMIN D3) 1000 units Tab Take 2,500 Units by mouth once daily.       No current facility-administered medications for this visit.       Review of patient's allergies indicates:   Allergen Reactions    Opioids - morphine analogues      PT STATES CAN TOLERATE MORPHINE---CAUSES HEADACHE       Past Medical History:   Diagnosis Date    Carcinoma of prostate     Diverticula, colon 2021    Essential hypertension     GERD (gastroesophageal reflux disease)     History of colon polyps 2021    Hyperlipidemia     Obesity     Polyp of ascending colon 2021       Past Surgical History:   Procedure Laterality Date    ABDOMINAL SURGERY N/A     blockage of the bowels    ARTHROPLASTY, KNEE, TOTAL, USING COMPUTER-ASSISTED NAVIGATION Right 2024    Procedure: ARTHROPLASTY, KNEE, TOTAL, USING COMPUTER-ASSISTED NAVIGATION;  Surgeon: Bill Avila III, MD;  Location: [facility] OR;  Service: Orthopedics;  Laterality: Right;    COLONOSCOPY  2021    repeat 1 year    LAPAROTOMY, EXPLORATORY N/A 2023    Procedure: LAPAROTOMY, EXPLORATORY;  Surgeon: Kaleigh Beach MD;  Location: [facility] OR;  Service: General;  Laterality: N/A;  mesh removal    PROSTATECTOMY      UMBILICAL HERNIA REPAIR      x 3        Family History   Problem Relation Name Age of Onset    Breast cancer Mother           at age 49 from breast CA with mets       Social History     Socioeconomic  History    Marital status:    Tobacco Use    Smoking status: Never    Smokeless tobacco: Never   Substance and Sexual Activity    Alcohol use: Yes     Comment: on occasion    Drug use: Never    Sexual activity: Not Currently     Social Determinants of Health     Financial Resource Strain: Low Risk  (1/8/2024)    Overall Financial Resource Strain (CARDIA)     Difficulty of Paying Living Expenses: Not hard at all   Food Insecurity: No Food Insecurity (1/8/2024)    Hunger Vital Sign     Worried About Running Out of Food in the Last Year: Never true     Ran Out of Food in the Last Year: Never true   Transportation Needs: No Transportation Needs (1/8/2024)    PRAPARE - Transportation     Lack of Transportation (Medical): No     Lack of Transportation (Non-Medical): No   Physical Activity: Inactive (1/8/2024)    Exercise Vital Sign     Days of Exercise per Week: 0 days     Minutes of Exercise per Session: 0 min   Stress: No Stress Concern Present (1/8/2024)    [employer] [place] [employer] - Occupational Stress Questionnaire     Feeling of Stress : Not at all   Housing Stability: Low Risk  (1/8/2024)    Housing Stability Vital Sign     Unable to Pay for Housing in the Last Year: No     Number of Places Lived in the Last Year: 1     Unstable Housing in the Last Year: No       There were no vitals filed for this visit.    Physical Exam  Constitutional:       General: He is not in acute distress.  HENT:      Head: Normocephalic.   Cardiovascular:      Rate and Rhythm: Normal rate and regular rhythm.      Pulses: Normal pulses.   Pulmonary:      Effort: Pulmonary effort is normal. No respiratory distress.      Breath sounds: Normal breath sounds.   Abdominal:      General: Abdomen is flat. There is no distension.      Palpations: Abdomen is soft.      Tenderness: There is no abdominal tenderness.      Hernia: A hernia (4 cm defect at the upper part of the incision reducible) is present.   Musculoskeletal:          General: Normal range of motion.   Skin:     General: Skin is warm.   Neurological:      General: No focal deficit present.      Mental Status: He is oriented to person, place, and time.         Assessment & Plan:    Ventral hernia without obstruction or gangrene    Pre-procedural examination  -     EKG 12-lead; Future  -     CBC Auto Differential; Future; Expected date: 06/03/2024  -     Basic Metabolic Panel; Future; Expected date: 06/03/2024        Patient to go to the OR in one-week time for a robotic versus open ventral hernia repair.  Risks and benefits explained to the patient clear risk of bleeding, infection, open operation, injury to surrounding organs, recurrence of the hernia, use of the mesh in his associated issues including but not limited to bleeding, erosion, migration.  All questions were answered.

## 2024-06-05 LAB
OHS QRS DURATION: 142 MS
OHS QTC CALCULATION: 467 MS

## 2024-06-10 ENCOUNTER — ANESTHESIA EVENT (OUTPATIENT)
Dept: SURGERY | Facility: HOSPITAL | Age: 79
End: 2024-06-10
Payer: MEDICARE

## 2024-06-10 ENCOUNTER — HOSPITAL ENCOUNTER (OUTPATIENT)
Facility: HOSPITAL | Age: 79
Discharge: HOME | End: 2024-06-10
Attending: SURGERY | Admitting: SURGERY
Payer: MEDICARE

## 2024-06-10 ENCOUNTER — ANESTHESIA (OUTPATIENT)
Dept: SURGERY | Facility: HOSPITAL | Age: 79
End: 2024-06-10
Payer: MEDICARE

## 2024-06-10 VITALS
HEART RATE: 83 BPM | DIASTOLIC BLOOD PRESSURE: 68 MMHG | TEMPERATURE: 98 F | SYSTOLIC BLOOD PRESSURE: 122 MMHG | BODY MASS INDEX: 36.57 KG/M2 | RESPIRATION RATE: 14 BRPM | OXYGEN SATURATION: 94 % | WEIGHT: 270 LBS | HEIGHT: 72 IN

## 2024-06-10 DIAGNOSIS — K43.9 VENTRAL HERNIA WITHOUT OBSTRUCTION OR GANGRENE: Primary | ICD-10-CM

## 2024-06-10 PROCEDURE — 71000016 HC POSTOP RECOV ADDL HR: Performed by: SURGERY

## 2024-06-10 PROCEDURE — 36000710: Performed by: SURGERY

## 2024-06-10 PROCEDURE — 63600175 PHARM REV CODE 636 W HCPCS: Performed by: NURSE ANESTHETIST, CERTIFIED REGISTERED

## 2024-06-10 PROCEDURE — 27000689 HC BLADE LARYNGOSCOPE ANY SIZE: Performed by: ANESTHESIOLOGY

## 2024-06-10 PROCEDURE — 71000015 HC POSTOP RECOV 1ST HR: Performed by: SURGERY

## 2024-06-10 PROCEDURE — 71000033 HC RECOVERY, INTIAL HOUR: Performed by: SURGERY

## 2024-06-10 PROCEDURE — 25000003 PHARM REV CODE 250: Performed by: NURSE ANESTHETIST, CERTIFIED REGISTERED

## 2024-06-10 PROCEDURE — 27000655: Performed by: ANESTHESIOLOGY

## 2024-06-10 PROCEDURE — 27201423 OPTIME MED/SURG SUP & DEVICES STERILE SUPPLY: Performed by: SURGERY

## 2024-06-10 PROCEDURE — 37000008 HC ANESTHESIA 1ST 15 MINUTES: Performed by: SURGERY

## 2024-06-10 PROCEDURE — 27000716 HC OXISENSOR PROBE, ANY SIZE: Performed by: ANESTHESIOLOGY

## 2024-06-10 PROCEDURE — 37000009 HC ANESTHESIA EA ADD 15 MINS: Performed by: SURGERY

## 2024-06-10 PROCEDURE — 25000003 PHARM REV CODE 250: Performed by: SURGERY

## 2024-06-10 PROCEDURE — 63600175 PHARM REV CODE 636 W HCPCS: Performed by: ANESTHESIOLOGY

## 2024-06-10 PROCEDURE — 63600175 PHARM REV CODE 636 W HCPCS: Performed by: SURGERY

## 2024-06-10 PROCEDURE — C1781 MESH (IMPLANTABLE): HCPCS | Performed by: SURGERY

## 2024-06-10 PROCEDURE — 49593 RPR AA HRN 1ST 3-10 RDC: CPT | Mod: 22,,, | Performed by: SURGERY

## 2024-06-10 PROCEDURE — 63600175 PHARM REV CODE 636 W HCPCS: Mod: JZ,JG | Performed by: SURGERY

## 2024-06-10 PROCEDURE — 27000510 HC BLANKET BAIR HUGGER ANY SIZE: Performed by: ANESTHESIOLOGY

## 2024-06-10 PROCEDURE — 36000711: Performed by: SURGERY

## 2024-06-10 PROCEDURE — 27000165 HC TUBE, ETT CUFFED: Performed by: ANESTHESIOLOGY

## 2024-06-10 DEVICE — VENTRALIGHT ST MESH WITH ECHO PS POSITONING SYSTEM
Type: IMPLANTABLE DEVICE | Site: ABDOMEN | Status: FUNCTIONAL
Brand: VENTRALIGHT ST MESH WITH ECHO PS POSITONING SYSTEM

## 2024-06-10 RX ORDER — ONDANSETRON 4 MG/1
8 TABLET, ORALLY DISINTEGRATING ORAL EVERY 8 HOURS PRN
Status: DISCONTINUED | OUTPATIENT
Start: 2024-06-10 | End: 2024-06-10 | Stop reason: HOSPADM

## 2024-06-10 RX ORDER — DEXAMETHASONE SODIUM PHOSPHATE 4 MG/ML
INJECTION, SOLUTION INTRA-ARTICULAR; INTRALESIONAL; INTRAMUSCULAR; INTRAVENOUS; SOFT TISSUE
Status: DISCONTINUED | OUTPATIENT
Start: 2024-06-10 | End: 2024-06-10

## 2024-06-10 RX ORDER — MEPERIDINE HYDROCHLORIDE 25 MG/ML
25 INJECTION INTRAMUSCULAR; INTRAVENOUS; SUBCUTANEOUS EVERY 10 MIN PRN
Status: DISCONTINUED | OUTPATIENT
Start: 2024-06-10 | End: 2024-06-10 | Stop reason: HOSPADM

## 2024-06-10 RX ORDER — PROPOFOL 10 MG/ML
VIAL (ML) INTRAVENOUS
Status: DISCONTINUED | OUTPATIENT
Start: 2024-06-10 | End: 2024-06-10

## 2024-06-10 RX ORDER — HYDROMORPHONE HYDROCHLORIDE 2 MG/ML
INJECTION, SOLUTION INTRAMUSCULAR; INTRAVENOUS; SUBCUTANEOUS
Status: DISCONTINUED | OUTPATIENT
Start: 2024-06-10 | End: 2024-06-10

## 2024-06-10 RX ORDER — LIDOCAINE HYDROCHLORIDE 20 MG/ML
INJECTION, SOLUTION EPIDURAL; INFILTRATION; INTRACAUDAL; PERINEURAL
Status: DISCONTINUED | OUTPATIENT
Start: 2024-06-10 | End: 2024-06-10

## 2024-06-10 RX ORDER — HYDROCODONE BITARTRATE AND ACETAMINOPHEN 7.5; 325 MG/1; MG/1
1 TABLET ORAL EVERY 6 HOURS PRN
Qty: 15 TABLET | Refills: 0 | Status: SHIPPED | OUTPATIENT
Start: 2024-06-10

## 2024-06-10 RX ORDER — BUPIVACAINE HYDROCHLORIDE 2.5 MG/ML
INJECTION, SOLUTION EPIDURAL; INFILTRATION; INTRACAUDAL
Status: DISCONTINUED | OUTPATIENT
Start: 2024-06-10 | End: 2024-06-10 | Stop reason: HOSPADM

## 2024-06-10 RX ORDER — ONDANSETRON HYDROCHLORIDE 2 MG/ML
INJECTION, SOLUTION INTRAVENOUS
Status: DISCONTINUED | OUTPATIENT
Start: 2024-06-10 | End: 2024-06-10

## 2024-06-10 RX ORDER — HYDROMORPHONE HYDROCHLORIDE 2 MG/ML
0.5 INJECTION, SOLUTION INTRAMUSCULAR; INTRAVENOUS; SUBCUTANEOUS EVERY 5 MIN PRN
Status: DISCONTINUED | OUTPATIENT
Start: 2024-06-10 | End: 2024-06-10 | Stop reason: HOSPADM

## 2024-06-10 RX ORDER — PHENYLEPHRINE HYDROCHLORIDE 10 MG/ML
INJECTION INTRAVENOUS
Status: DISCONTINUED | OUTPATIENT
Start: 2024-06-10 | End: 2024-06-10

## 2024-06-10 RX ORDER — ROCURONIUM BROMIDE 10 MG/ML
INJECTION, SOLUTION INTRAVENOUS
Status: DISCONTINUED | OUTPATIENT
Start: 2024-06-10 | End: 2024-06-10

## 2024-06-10 RX ORDER — DIPHENHYDRAMINE HYDROCHLORIDE 50 MG/ML
25 INJECTION INTRAMUSCULAR; INTRAVENOUS EVERY 6 HOURS PRN
Status: DISCONTINUED | OUTPATIENT
Start: 2024-06-10 | End: 2024-06-10 | Stop reason: HOSPADM

## 2024-06-10 RX ORDER — ONDANSETRON HYDROCHLORIDE 2 MG/ML
4 INJECTION, SOLUTION INTRAVENOUS DAILY PRN
Status: DISCONTINUED | OUTPATIENT
Start: 2024-06-10 | End: 2024-06-10 | Stop reason: HOSPADM

## 2024-06-10 RX ORDER — SODIUM CHLORIDE 9 MG/ML
INJECTION, SOLUTION INTRAVENOUS CONTINUOUS
Status: DISCONTINUED | OUTPATIENT
Start: 2024-06-10 | End: 2024-06-10 | Stop reason: HOSPADM

## 2024-06-10 RX ORDER — FENTANYL CITRATE 50 UG/ML
INJECTION, SOLUTION INTRAMUSCULAR; INTRAVENOUS
Status: DISCONTINUED | OUTPATIENT
Start: 2024-06-10 | End: 2024-06-10

## 2024-06-10 RX ADMIN — PROPOFOL 160 MG: 10 INJECTION, EMULSION INTRAVENOUS at 12:06

## 2024-06-10 RX ADMIN — DEXAMETHASONE SODIUM PHOSPHATE 8 MG: 4 INJECTION, SOLUTION INTRA-ARTICULAR; INTRALESIONAL; INTRAMUSCULAR; INTRAVENOUS; SOFT TISSUE at 12:06

## 2024-06-10 RX ADMIN — HYDROMORPHONE HYDROCHLORIDE 0.4 MG: 2 INJECTION, SOLUTION INTRAMUSCULAR; INTRAVENOUS; SUBCUTANEOUS at 12:06

## 2024-06-10 RX ADMIN — ROCURONIUM BROMIDE 50 MG: 10 INJECTION, SOLUTION INTRAVENOUS at 12:06

## 2024-06-10 RX ADMIN — HYDROMORPHONE HYDROCHLORIDE 0.5 MG: 2 INJECTION INTRAMUSCULAR; INTRAVENOUS; SUBCUTANEOUS at 02:06

## 2024-06-10 RX ADMIN — PHENYLEPHRINE HYDROCHLORIDE 100 MCG: 10 INJECTION INTRAVENOUS at 12:06

## 2024-06-10 RX ADMIN — SODIUM CHLORIDE: 9 INJECTION, SOLUTION INTRAVENOUS at 10:06

## 2024-06-10 RX ADMIN — FENTANYL CITRATE 100 MCG: 50 INJECTION INTRAMUSCULAR; INTRAVENOUS at 12:06

## 2024-06-10 RX ADMIN — CEFAZOLIN 2 G: 2 INJECTION, POWDER, FOR SOLUTION INTRAMUSCULAR; INTRAVENOUS at 12:06

## 2024-06-10 RX ADMIN — ONDANSETRON 4 MG: 2 INJECTION INTRAMUSCULAR; INTRAVENOUS at 12:06

## 2024-06-10 RX ADMIN — LIDOCAINE HYDROCHLORIDE 100 MG: 20 INJECTION, SOLUTION INTRAVENOUS at 12:06

## 2024-06-10 RX ADMIN — SUGAMMADEX 200 MG: 100 INJECTION, SOLUTION INTRAVENOUS at 01:06

## 2024-06-10 RX ADMIN — HYDROMORPHONE HYDROCHLORIDE 1.6 MG: 2 INJECTION, SOLUTION INTRAMUSCULAR; INTRAVENOUS; SUBCUTANEOUS at 01:06

## 2024-06-10 NOTE — ANESTHESIA PREPROCEDURE EVALUATION
06/10/2024  Diogenes Aguilar is a 79 y.o., male.      Pre-op Assessment    I have reviewed the Patient Summary Reports.    I have reviewed the NPO Status.   I have reviewed the Medications.     Review of Systems         Anesthesia Plan  Type of Anesthesia, risks & benefits discussed:    Anesthesia Type: Gen ETT  Intra-op Monitoring Plan: Standard ASA Monitors  Post Op Pain Control Plan: IV/PO Opioids PRN  Induction:  IV  Informed Consent: Informed consent signed with the Patient and all parties understand the risks and agree with anesthesia plan.  All questions answered.   ASA Score: 1    Ready For Surgery From Anesthesia Perspective.     .  NPO greater than 8 hours  Had some bradycardia during a colooscopy  NKDA    Hct 49  Cr 1.4  6/3/24 EKG: Sinus rhythm with 1st degree A-V block 83 bpm  Right bundle branch block   Inferior infarct ,age undetermined     GERD    MP II; adequate ROM at neck     Fall Risk

## 2024-06-10 NOTE — DISCHARGE SUMMARY
[facility] - Periop Services  Discharge Note  Short Stay    Procedure(s) (LRB):  XI ROBOTIC REPAIR, HERNIA, VENTRAL (N/A)  XI ROBOTIC LYSIS, ADHESIONS      OUTCOME: Patient tolerated treatment/procedure well without complication and is now ready for discharge.    DISPOSITION: Home or Self Care    FINAL DIAGNOSIS:  Ventral hernia    FOLLOWUP: In clinic    DISCHARGE INSTRUCTIONS:    Discharge Procedure Orders   Diet Adult Regular     Remove dressing in 24 hours     Notify your health care provider if you experience any of the following:  temperature >100.4     Notify your health care provider if you experience any of the following:  persistent nausea and vomiting or diarrhea     Notify your health care provider if you experience any of the following:  severe uncontrolled pain     Notify your health care provider if you experience any of the following:  redness, tenderness, or signs of infection (pain, swelling, redness, odor or green/yellow discharge around incision site)     Notify your health care provider if you experience any of the following:  difficulty breathing or increased cough     Notify your health care provider if you experience any of the following:  severe persistent headache     Notify your health care provider if you experience any of the following:  worsening rash     Notify your health care provider if you experience any of the following:  persistent dizziness, light-headedness, or visual disturbances     Notify your health care provider if you experience any of the following:  increased confusion or weakness     Notify your health care provider if you experience any of the following:     Shower on day dressing removed (No bath)        TIME SPENT ON DISCHARGE: 5 minutes

## 2024-06-10 NOTE — OP NOTE
[facility] - Periop Services  Surgery Department  Operative Note    SUMMARY     Date of Procedure: 6/10/2024     Procedure: Procedure(s) (LRB):  XI ROBOTIC REPAIR, HERNIA, VENTRAL (N/A)  XI ROBOTIC LYSIS, ADHESIONS     Surgeons and Role:     * Roby Mcmahon MD - Primary    Assisting Surgeon: None    Pre-Operative Diagnosis: Ventral hernia without obstruction or gangrene [K43.9]    Post-Operative Diagnosis: Post-Op Diagnosis Codes:     * Ventral hernia without obstruction or gangrene [K43.9]    Anesthesia: General    Procedures Performed: Robotic ventral hernia repair with extensive lysis of adhesions    Significant Findings of the Procedure: .Large amount of adhesions in the anterior abdominal wall.  Numerous Swiss cheese like defects in the midline and proximally 11 cm area small ones.  Most in the epigastric and periumbilical region.    Procedure in Detail: After informed consent was obtained patient was brought to the OR prepped and draped in the usual sterile fashion. We started off by optically inserting a 5 mm trocar in the left upper quadrant subcostal area. Pneumoperitoneum was obtained to 15 mmHg of pressure. We then under direct visualization placed 2 additional 8 mm robotic trochars in the left lower quadrant and left abdominal area. We then replaced our 5 mm trochar with another 8 mm robotic trochar.  We had to lyse some adhesions before we could fully docked the robot.  Small bowel stuck to the left anterior abdominal wall we carefully took down.  At that point we felt we were able to dock the robot.  We then brought in and docked the robot. The camera was placed in port #2, the fenestrated bipolar in port #1, and the scissors with heat in port #3. We began by taking down the omental adhesions the anterior abdominal wall.  There was a lot small bowel very stuck to the anterior abdominal wall and we spent a good amount of time lysing these adhesions.  We could appreciate numerous small  hernias all about a cm in size at the umbilical and upper abdominal region.  There were least 5 small defects spread along an 11 cm length. We then using a #1 Strattafix suture closed these defects primarily.  We had to place a 12 mm trocar in the right upper quadrant under optical insertion methods to allow for our mesh to be placed in.  We then placed a 15 cm round Ventralight with echo positioning system intra-abdominally through our port site.  We made a small stab incision at the middle portion and pulled it up into the anterior abdominal wall.  We made sure the coated surface faced the bowels.  We then used a 2-0 Ethibond suture and circumferentially sutured this to the anterior abdominal wall including the fascia. We then found that it laid flat and in good location. We then removed all our needles. We ensured hemostasis and discontinued pneumoperitoneum and closed the skin with a 4 Monocryl in a  subcuticular manner.    Twenty-two modifier to this case due to the extra time lysing adhesions and extra time required than a normal ventral hernia takes.  Incarcerated bowel within these defects as well.    Complications: No    Estimated Blood Loss (EBL): 10 cc           Implants:   Implant Name Type Inv. Item Serial No.  Lot No. LRB No. Used Action   MESH VENTRALIGHT ST 6IN [facility] - [id number]  MESH VENTRALIGHT ST 6IN [facility]  [employer] [place]   1 Implanted       Specimens:   Specimen (24h ago, onward)      None                    Condition: Good    Disposition: PACU - hemodynamically stable.    Attestation: I was present and scrubbed for the entire procedure.

## 2024-06-10 NOTE — TRANSFER OF CARE
Anesthesia Transfer of Care Note    Patient: Diogenes Aguilar    Procedure(s) Performed: Procedure(s) (LRB):  XI ROBOTIC REPAIR, HERNIA, VENTRAL (N/A)  XI ROBOTIC LYSIS, ADHESIONS    Patient location: PACU    Anesthesia Type: general    Transport from OR: Transported from OR on 6-10 L/min O2 by face mask with adequate spontaneous ventilation    Post pain: adequate analgesia    Post assessment: no apparent anesthetic complications    Post vital signs: stable    Level of consciousness: responds to stimulation, awake and sedated    Nausea/Vomiting: no nausea/vomiting    Complications: none    Transfer of care protocol was followed      Last vitals: Visit Vitals  /71   Pulse 71   Temp 36.5 °C (97.7 °F)   Resp 11   Ht 6' (1.829 m)   Wt 122.5 kg (270 lb)   SpO2 96%   BMI 36.62 kg/m²

## 2024-06-10 NOTE — ANESTHESIA PROCEDURE NOTES
Intubation    Date/Time: 6/10/2024 12:20 PM    Performed by: Tabitha Kamara CRNA  Authorized by: Robert Mcdonald MD    Intubation:     Induction:  Intravenous    Intubated:  Postinduction    Mask Ventilation:  Easy mask    Attempts:  1    Attempted By:  CRNA    Method of Intubation:  Direct    Blade:  Zhen 4    Laryngeal View Grade: Grade IIA - cords partially seen      Difficult Airway Encountered?: No      Complications:  None    Airway Device:  Oral endotracheal tube    Airway Device Size:  7.5    Style/Cuff Inflation:  Cuffed (inflated to minimal occlusive pressure)    Inflation Amount (mL):  10    Tube secured:  22    Placement Verified By:  Capnometry    Complicating Factors:  None    Findings Post-Intubation:  BS equal bilateral and atraumatic/condition of teeth unchanged

## 2024-06-10 NOTE — PROGRESS NOTES
Report given and care released to ELIESER Shaw. VSS- 94% RA, HR 79, 115/73, resp 13. Natalee c/d/ARGENIS VELÁSQUEZ. Wife at bedside- all questions answered.

## 2024-06-13 NOTE — ANESTHESIA POSTPROCEDURE EVALUATION
Anesthesia Post Evaluation    Patient: Diogenes Aguilar    Procedure(s) Performed: Procedure(s) (LRB):  XI ROBOTIC REPAIR, HERNIA, VENTRAL (N/A)  XI ROBOTIC LYSIS, ADHESIONS    Final Anesthesia Type: general      Patient location during evaluation: PACU  Patient participation: Yes- Able to Participate  Level of consciousness: awake and alert  Post-procedure vital signs: reviewed and stable  Pain management: adequate  Airway patency: patent  MANUELA mitigation strategies: Multimodal analgesia  PONV status at discharge: No PONV  Anesthetic complications: no      Cardiovascular status: blood pressure returned to baseline  Respiratory status: unassisted  Hydration status: euvolemic  Follow-up not needed.              Vitals Value Taken Time   /68 06/10/24 1701   Temp 36.5 °C (97.7 °F) 06/10/24 1405   Pulse 83 06/10/24 1712   Resp 14 06/10/24 1545   SpO2 92 % 06/10/24 1712   Vitals shown include unfiled device data.      Event Time   Out of Recovery 15:00:00         Pain/Paramjit Score: No data recorded

## 2024-06-24 ENCOUNTER — OFFICE VISIT (OUTPATIENT)
Dept: SURGERY | Facility: CLINIC | Age: 79
End: 2024-06-24
Attending: SURGERY
Payer: MEDICARE

## 2024-06-24 DIAGNOSIS — K59.00 CONSTIPATION, UNSPECIFIED CONSTIPATION TYPE: Primary | ICD-10-CM

## 2024-06-24 DIAGNOSIS — K43.9 VENTRAL HERNIA WITHOUT OBSTRUCTION OR GANGRENE: ICD-10-CM

## 2024-06-24 PROCEDURE — 99213 OFFICE O/P EST LOW 20 MIN: CPT | Mod: PBBFAC | Performed by: SURGERY

## 2024-06-24 PROCEDURE — 99999 PR PBB SHADOW E&M-EST. PATIENT-LVL III: CPT | Mod: PBBFAC,,, | Performed by: SURGERY

## 2024-06-24 PROCEDURE — 99212 OFFICE O/P EST SF 10 MIN: CPT | Mod: S$PBB,,, | Performed by: SURGERY

## 2024-06-24 NOTE — PROGRESS NOTES
General Surgery Progress Note    Subjective:      Patient ID: Diogenes Aguilar is a 79 y.o. male.    Chief Complaint: Post-op Evaluation      HPI:  79-year-old male status post robotic ventral hernia pair for recurrent upper abdominal Gabonese-cheese hernias after an ex lap by another doctor.  Doing well overall.  Has some mild soreness but all lot of his pain and bulging is resolved now.  Not doing any heavy lifting still.  Getting around with no issues.  His pain was minimal after surgery and he is feeling much better now.    Past Medical History:   Diagnosis Date    Carcinoma of prostate     Diverticula, colon 08/19/2021    Essential hypertension     GERD (gastroesophageal reflux disease)     History of colon polyps 08/19/2021    Hyperlipidemia     Obesity     Polyp of ascending colon 08/19/2021     Past Surgical History:   Procedure Laterality Date    ABDOMINAL SURGERY N/A     blockage of the bowels    ARTHROPLASTY, KNEE, TOTAL, USING COMPUTER-ASSISTED NAVIGATION Right 1/8/2024    Procedure: ARTHROPLASTY, KNEE, TOTAL, USING COMPUTER-ASSISTED NAVIGATION;  Surgeon: Bill Avila III, MD;  Location: [facility] OR;  Service: Orthopedics;  Laterality: Right;    COLONOSCOPY  08/23/2021    repeat 1 year    LAPAROTOMY, EXPLORATORY N/A 2/24/2023    Procedure: LAPAROTOMY, EXPLORATORY;  Surgeon: Kaleigh Beach MD;  Location: [facility] OR;  Service: General;  Laterality: N/A;  mesh removal    PROSTATECTOMY      ROBOT-ASSISTED LAPAROSCOPIC LYSIS OF ADHESIONS USING DA VICKY XI  6/10/2024    Procedure: XI ROBOTIC LYSIS, ADHESIONS;  Surgeon: Roby Mcmahon MD;  Location: [facility] OR;  Service: General;;    ROBOT-ASSISTED REPAIR OF VENTRAL HERNIA USING DA VICKY XI N/A 6/10/2024    Procedure: XI ROBOTIC REPAIR, HERNIA, VENTRAL;  Surgeon: Roby Mcmahon MD;  Location: [facility] OR;  Service: General;  Laterality: N/A;    UMBILICAL HERNIA REPAIR      x 3      Social  History     Socioeconomic History    Marital status:    Tobacco Use    Smoking status: Never    Smokeless tobacco: Never   Substance and Sexual Activity    Alcohol use: Yes     Comment: on occasion    Drug use: Never    Sexual activity: Not Currently     Social Determinants of Health     Financial Resource Strain: Low Risk  (1/8/2024)    Overall Financial Resource Strain (CARDIA)     Difficulty of Paying Living Expenses: Not hard at all   Food Insecurity: No Food Insecurity (1/8/2024)    Hunger Vital Sign     Worried About Running Out of Food in the Last Year: Never true     Ran Out of Food in the Last Year: Never true   Transportation Needs: No Transportation Needs (1/8/2024)    PRAPARE - Transportation     Lack of Transportation (Medical): No     Lack of Transportation (Non-Medical): No   Physical Activity: Inactive (1/8/2024)    Exercise Vital Sign     Days of Exercise per Week: 0 days     Minutes of Exercise per Session: 0 min   Stress: No Stress Concern Present (1/8/2024)    [employer] [place] - Occupational Stress Questionnaire     Feeling of Stress : Not at all   Housing Stability: Low Risk  (1/8/2024)    Housing Stability Vital Sign     Unable to Pay for Housing in the Last Year: No     Number of Places Lived in the Last Year: 1     Unstable Housing in the Last Year: No         Current Outpatient Medications:     amLODIPine (NORVASC) 5 MG tablet, Take 1 tablet (5 mg total) by mouth 2 (two) times daily., Disp: 180 tablet, Rfl: 3    aspirin (ECOTRIN) 325 MG EC tablet, Take 1 tablet (325 mg total) by mouth 2 (two) times a day., Disp: , Rfl: 0    gemfibroziL (LOPID) 600 MG tablet, TAKE ONE TABLET BY MOUTH ONCE DAILY WITH FOOD, Disp: 90 tablet, Rfl: 3    lisinopriL (PRINIVIL,ZESTRIL) 20 MG tablet, Take 1 tablet (20 mg total) by mouth once daily., Disp: 90 tablet, Rfl: 3    meloxicam (MOBIC) 7.5 MG tablet, Take 1 tablet (7.5 mg total) by mouth once daily., Disp: 30 tablet, Rfl: 1     omeprazole (PRILOSEC) 20 MG capsule, TAKE ONE CAPSULE BY MOUTH ONCE DAILY, Disp: 90 capsule, Rfl: 3    pravastatin (PRAVACHOL) 80 MG tablet, TAKE ONE TABLET BY MOUTH ONCE DAILY, Disp: 90 tablet, Rfl: 3    predniSONE (DELTASONE) 20 MG tablet, Take 3 tablets (60 mg total) by mouth once daily., Disp: 15 tablet, Rfl: 0    vitamin D (VITAMIN D3) 1000 units Tab, Take 2,500 Units by mouth once daily., Disp: , Rfl:     HYDROcodone-acetaminophen (NORCO) 7.5-325 mg per tablet, Take 1 tablet by mouth every 6 (six) hours as needed for Pain. (Patient not taking: Reported on 6/24/2024), Disp: 15 tablet, Rfl: 0    oxyCODONE-acetaminophen (PERCOCET) 7.5-325 mg per tablet, Take 1 tablet by mouth every 4 (four) hours as needed for Pain. (Patient not taking: Reported on 6/24/2024), Disp: 20 tablet, Rfl: 0  Review of patient's allergies indicates:  No Known Allergies    There were no vitals taken for this visit.    ROS      Objective:     Constitutional: Well, No apparent distress  Mental Status: Alert and oriented  x 3  Eyes: Normal sclera, normal eyelid  Neck: Trachea midline, no masses on neck exam  Respiratory: Clear to auscultation bilaterally with no wheezes/crackles/cough  Cardiac: Regular rate and rhythm, no murmur, rub, or gallops  Abdominal: Soft, non-tender, non-distented  Hernia: No appreciated hernias  Musculoskeletal: 5/5 strength no weakess no decreased range of montion  Neurologic: Cranial nerves II - XII intact    Labs/ Imaging: CBC:   Lab Results   Component Value Date/Time    WBC 9.10 06/03/2024 03:19 PM    RBC 5.45 06/03/2024 03:19 PM    HGB 15.5 06/03/2024 03:19 PM    HCT 48.8 06/03/2024 03:19 PM     06/03/2024 03:19 PM    MCV 89.5 06/03/2024 03:19 PM    MCH 28.4 06/03/2024 03:19 PM    MCHC 31.8 (L) 06/03/2024 03:19 PM     BMP:   Lab Results   Component Value Date/Time    GLU 95 06/03/2024 03:19 PM    CO2 25 06/03/2024 03:19 PM    BUN 28 (H) 06/03/2024 03:19 PM    CREATININE 1.43 (H) 06/03/2024 03:19 PM     CALCIUM 10.0 06/03/2024 03:19 PM    MG 2.1 02/22/2023 10:06 PM           Assessment:             1. Constipation, unspecified constipation type    2. Ventral hernia without obstruction or gangrene          Plan:       Orders Placed This Encounter    Colonoscopy     No follow-ups on file.      Patient doing great.  Counseled on avoiding heavy lifting for least 2 more weeks.  He will ease into the lifting gradually and not do any sudden jerking.  He will come back and see me for any worsening pain, nausea, vomiting, or any other issues.

## 2024-06-30 ENCOUNTER — EXTERNAL CHRONIC CARE MANAGEMENT (OUTPATIENT)
Dept: PRIMARY CARE CLINIC | Facility: CLINIC | Age: 79
End: 2024-06-30
Payer: MEDICARE

## 2024-06-30 PROCEDURE — G0511 CCM/BHI BY RHC/FQHC 20MIN MO: HCPCS | Mod: ,,, | Performed by: FAMILY MEDICINE

## 2024-07-01 DIAGNOSIS — K59.00 CONSTIPATION, UNSPECIFIED CONSTIPATION TYPE: Primary | ICD-10-CM

## 2024-07-31 ENCOUNTER — EXTERNAL CHRONIC CARE MANAGEMENT (OUTPATIENT)
Dept: PRIMARY CARE CLINIC | Facility: CLINIC | Age: 79
End: 2024-07-31
Payer: MEDICARE

## 2024-07-31 PROCEDURE — G0511 CCM/BHI BY RHC/FQHC 20MIN MO: HCPCS | Mod: ,,, | Performed by: FAMILY MEDICINE

## 2024-08-31 ENCOUNTER — EXTERNAL CHRONIC CARE MANAGEMENT (OUTPATIENT)
Dept: PRIMARY CARE CLINIC | Facility: CLINIC | Age: 79
End: 2024-08-31
Payer: MEDICARE

## 2024-08-31 PROCEDURE — G0511 CCM/BHI BY RHC/FQHC 20MIN MO: HCPCS | Mod: ,,, | Performed by: FAMILY MEDICINE

## 2024-09-04 ENCOUNTER — OFFICE VISIT (OUTPATIENT)
Dept: PRIMARY CARE CLINIC | Facility: CLINIC | Age: 79
End: 2024-09-04
Payer: MEDICARE

## 2024-09-04 VITALS
SYSTOLIC BLOOD PRESSURE: 114 MMHG | RESPIRATION RATE: 18 BRPM | WEIGHT: 278 LBS | BODY MASS INDEX: 37.65 KG/M2 | HEART RATE: 90 BPM | HEIGHT: 72 IN | DIASTOLIC BLOOD PRESSURE: 70 MMHG | OXYGEN SATURATION: 97 %

## 2024-09-04 DIAGNOSIS — E78.5 HYPERLIPIDEMIA, UNSPECIFIED HYPERLIPIDEMIA TYPE: ICD-10-CM

## 2024-09-04 DIAGNOSIS — Z86.010 HX OF COLONIC POLYPS: ICD-10-CM

## 2024-09-04 DIAGNOSIS — C61 CARCINOMA OF PROSTATE: ICD-10-CM

## 2024-09-04 DIAGNOSIS — E74.819 DISORDERS OF GLUCOSE TRANSPORT, UNSPECIFIED: ICD-10-CM

## 2024-09-04 DIAGNOSIS — E78.5 DYSLIPIDEMIA: Primary | ICD-10-CM

## 2024-09-04 DIAGNOSIS — I10 HYPERTENSION, UNSPECIFIED TYPE: ICD-10-CM

## 2024-09-04 DIAGNOSIS — E11.9 TYPE 2 DIABETES MELLITUS WITHOUT COMPLICATION, WITHOUT LONG-TERM CURRENT USE OF INSULIN: ICD-10-CM

## 2024-09-04 DIAGNOSIS — M17.9 OSTEOARTHRITIS OF KNEE, UNSPECIFIED LATERALITY, UNSPECIFIED OSTEOARTHRITIS TYPE: ICD-10-CM

## 2024-09-04 DIAGNOSIS — Z12.5 ENCOUNTER FOR SCREENING FOR MALIGNANT NEOPLASM OF PROSTATE: ICD-10-CM

## 2024-09-04 DIAGNOSIS — K57.30 DIVERTICULOSIS OF COLON: ICD-10-CM

## 2024-09-04 PROCEDURE — 99214 OFFICE O/P EST MOD 30 MIN: CPT | Mod: ,,, | Performed by: FAMILY MEDICINE

## 2024-09-04 RX ORDER — PRAVASTATIN SODIUM 80 MG/1
80 TABLET ORAL DAILY
Qty: 90 TABLET | Refills: 3 | Status: SHIPPED | OUTPATIENT
Start: 2024-09-04

## 2024-09-04 NOTE — PROGRESS NOTES
Subjective:      Patient ID: Diogenes Aguilar is a 79 y.o. male.    Chief Complaint: Follow-up and Hypertension    Diogenes Aguilar a 79 y.o. male presents for follow up on all regular problems which are reviewed and discussed.   Doing well  Problem List Items Addressed This Visit          Cardiac/Vascular    Hypertension    Hyperlipidemia       Oncology    Carcinoma of prostate     Other Visit Diagnoses       Dyslipidemia    -  Primary    Relevant Orders    CBC Auto Differential    Comprehensive Metabolic Panel    Lipid Panel    PSA, Screening    TSH    Hemoglobin A1C    Encounter for screening for malignant neoplasm of prostate        Relevant Orders    PSA, Screening    Disorders of glucose transport, unspecified        Relevant Orders    Hemoglobin A1C            Past Medical History:  Past Medical History:   Diagnosis Date    Carcinoma of prostate     Diverticula, colon 08/19/2021    Essential hypertension     GERD (gastroesophageal reflux disease)     History of colon polyps 08/19/2021    Hyperlipidemia     Obesity     Polyp of ascending colon 08/19/2021     Past Surgical History:   Procedure Laterality Date    ABDOMINAL SURGERY N/A     blockage of the bowels    ARTHROPLASTY, KNEE, TOTAL, USING COMPUTER-ASSISTED NAVIGATION Right 1/8/2024    Procedure: ARTHROPLASTY, KNEE, TOTAL, USING COMPUTER-ASSISTED NAVIGATION;  Surgeon: Bill Avila III, MD;  Location: [facility] OR;  Service: Orthopedics;  Laterality: Right;    COLONOSCOPY  08/23/2021    repeat 1 year    LAPAROTOMY, EXPLORATORY N/A 2/24/2023    Procedure: LAPAROTOMY, EXPLORATORY;  Surgeon: Kaleigh Beach MD;  Location: [facility] OR;  Service: General;  Laterality: N/A;  mesh removal    PROSTATECTOMY      ROBOT-ASSISTED LAPAROSCOPIC LYSIS OF ADHESIONS USING DA VICKY XI  6/10/2024    Procedure: XI ROBOTIC LYSIS, ADHESIONS;  Surgeon: Roby Mcmahon MD;  Location: [facility] OR;  Service: General;;    ROBOT-ASSISTED REPAIR OF VENTRAL HERNIA USING DA VICKY XI  N/A 6/10/2024    Procedure: XI ROBOTIC REPAIR, HERNIA, VENTRAL;  Surgeon: Roby Mcmahon MD;  Location: [facility];  Service: General;  Laterality: N/A;    UMBILICAL HERNIA REPAIR      x 3      Review of patient's allergies indicates:  No Known Allergies  Current Outpatient Medications on File Prior to Visit   Medication Sig Dispense Refill    amLODIPine (NORVASC) 5 MG tablet Take 1 tablet (5 mg total) by mouth 2 (two) times daily. 180 tablet 3    aspirin (ECOTRIN) 325 MG EC tablet Take 1 tablet (325 mg total) by mouth 2 (two) times a day.  0    gemfibroziL (LOPID) 600 MG tablet TAKE ONE TABLET BY MOUTH ONCE DAILY WITH FOOD 90 tablet 3    lisinopriL (PRINIVIL,ZESTRIL) 20 MG tablet Take 1 tablet (20 mg total) by mouth once daily. 90 tablet 3    meloxicam (MOBIC) 7.5 MG tablet Take 1 tablet (7.5 mg total) by mouth once daily. 30 tablet 1    omeprazole (PRILOSEC) 20 MG capsule TAKE ONE CAPSULE BY MOUTH ONCE DAILY 90 capsule 3    vitamin D (VITAMIN D3) 1000 units Tab Take 2,500 Units by mouth once daily.      [DISCONTINUED] pravastatin (PRAVACHOL) 80 MG tablet TAKE ONE TABLET BY MOUTH ONCE DAILY 90 tablet 3    [DISCONTINUED] HYDROcodone-acetaminophen (NORCO) 7.5-325 mg per tablet Take 1 tablet by mouth every 6 (six) hours as needed for Pain. (Patient not taking: Reported on 6/24/2024) 15 tablet 0    [DISCONTINUED] oxyCODONE-acetaminophen (PERCOCET) 7.5-325 mg per tablet Take 1 tablet by mouth every 4 (four) hours as needed for Pain. (Patient not taking: Reported on 6/24/2024) 20 tablet 0    [DISCONTINUED] predniSONE (DELTASONE) 20 MG tablet Take 3 tablets (60 mg total) by mouth once daily. 15 tablet 0     No current facility-administered medications on file prior to visit.     Social History     Socioeconomic History    Marital status:    Tobacco Use    Smoking status: Never    Smokeless tobacco: Never   Substance and Sexual Activity    Alcohol use: Yes     Comment: on occasion    Drug use: Never    Sexual  activity: Not Currently     Social Determinants of Health     Financial Resource Strain: Low Risk  (2024)    Overall Financial Resource Strain (CARDIA)     Difficulty of Paying Living Expenses: Not hard at all   Food Insecurity: No Food Insecurity (2024)    Hunger Vital Sign     Worried About Running Out of Food in the Last Year: Never true     Ran Out of Food in the Last Year: Never true   Transportation Needs: No Transportation Needs (2024)    PRAPARE - Transportation     Lack of Transportation (Medical): No     Lack of Transportation (Non-Medical): No   Physical Activity: Inactive (2024)    Exercise Vital Sign     Days of Exercise per Week: 0 days     Minutes of Exercise per Session: 0 min   Stress: No Stress Concern Present (2024)    [employer] - Occupational Stress Questionnaire     Feeling of Stress : Not at all   Housing Stability: Low Risk  (2024)    Housing Stability Vital Sign     Unable to Pay for Housing in the Last Year: No     Number of Places Lived in the Last Year: 1     Unstable Housing in the Last Year: No     Family History   Problem Relation Name Age of Onset    Breast cancer Mother           at age 49 from breast CA with mets       Review of Systems   Constitutional: Negative.    HENT:  Negative for congestion, ear pain, nosebleeds and trouble swallowing.    Eyes:  Negative for pain and itching.   Respiratory:  Negative for chest tightness.    Cardiovascular:  Negative for chest pain.   Gastrointestinal:  Negative for abdominal distention.   Endocrine: Negative for cold intolerance and heat intolerance.   Genitourinary:  Negative for difficulty urinating.   Musculoskeletal:  Negative for arthralgias.   Neurological:  Negative for dizziness.     Objective:     /70 (BP Location: Right arm, Patient Position: Sitting, BP Method: Large (Manual))   Pulse 90   Resp 18   Ht 6' (1.829 m)   Wt 126.1 kg (278 lb)   SpO2 97%   BMI 37.70  kg/m²     Physical Exam  Constitutional:       Appearance: Normal appearance. He is obese.   HENT:      Head: Normocephalic and atraumatic.      Right Ear: External ear normal.      Left Ear: External ear normal.      Nose: Nose normal.      Mouth/Throat:      Mouth: Mucous membranes are moist.      Pharynx: Oropharynx is clear.   Eyes:      Pupils: Pupils are equal, round, and reactive to light.   Neck:      Vascular: No carotid bruit.   Cardiovascular:      Rate and Rhythm: Normal rate and regular rhythm.      Heart sounds: Normal heart sounds. No murmur heard.     No gallop.   Pulmonary:      Effort: Pulmonary effort is normal. No respiratory distress.      Breath sounds: Normal breath sounds. No wheezing or rales.   Abdominal:      Palpations: Abdomen is soft.   Musculoskeletal:         General: Normal range of motion.      Cervical back: Normal range of motion and neck supple.   Skin:     General: Skin is warm and dry.   Neurological:      General: No focal deficit present.      Mental Status: He is alert.   Psychiatric:         Mood and Affect: Mood normal.         Behavior: Behavior normal.         Thought Content: Thought content normal.         Judgment: Judgment normal.   Assessment:     1. Dyslipidemia    2. Encounter for screening for malignant neoplasm of prostate    3. Disorders of glucose transport, unspecified    4. Hypertension, unspecified type    5. Hyperlipidemia, unspecified hyperlipidemia type    6. Carcinoma of prostate        Plan:     Problem List Items Addressed This Visit          Cardiac/Vascular    Hypertension    Hyperlipidemia       Oncology    Carcinoma of prostate     Other Visit Diagnoses       Dyslipidemia    -  Primary    Relevant Orders    CBC Auto Differential    Comprehensive Metabolic Panel    Lipid Panel    PSA, Screening    TSH    Hemoglobin A1C    Encounter for screening for malignant neoplasm of prostate        Relevant Orders    PSA, Screening    Disorders of glucose  transport, unspecified        Relevant Orders    Hemoglobin A1C          No follow-ups on file.  Lab owo  6m fu    I have changed DIOGENES Aguilar's pravastatin. I am also having him maintain his vitamin D, meloxicam, aspirin, omeprazole, gemfibroziL, lisinopriL, and amLODIPine.    Diogenes was seen today for follow-up and hypertension.    Diagnoses and all orders for this visit:    Dyslipidemia  -     CBC Auto Differential; Future  -     Comprehensive Metabolic Panel; Standing  -     Lipid Panel; Standing  -     PSA, Screening; Future  -     TSH; Standing  -     Hemoglobin A1C; Future    Encounter for screening for malignant neoplasm of prostate  -     PSA, Screening; Future    Disorders of glucose transport, unspecified  -     Hemoglobin A1C; Future    Hypertension, unspecified type    Hyperlipidemia, unspecified hyperlipidemia type    Carcinoma of prostate    Other orders  -     pravastatin (PRAVACHOL) 80 MG tablet; Take 1 tablet (80 mg total) by mouth once daily.      Medications Ordered This Encounter   Medications    pravastatin (PRAVACHOL) 80 MG tablet     Sig: Take 1 tablet (80 mg total) by mouth once daily.     Dispense:  90 tablet     Refill:  3     This prescription was filled on 5/9/2023. Any refills authorized will be placed on file.     [unfilled]  Orders Placed This Encounter   Procedures    CBC Auto Differential     Standing Status:   Future     Standing Expiration Date:   12/3/2025    Comprehensive Metabolic Panel     Standing Status:   Standing     Number of Occurrences:   4     Standing Expiration Date:   9/4/2025    Lipid Panel     Standing Status:   Standing     Number of Occurrences:   2     Standing Expiration Date:   11/3/2025    PSA, Screening     Standing Status:   Future     Standing Expiration Date:   12/3/2025    TSH     Standing Status:   Standing     Number of Occurrences:   2     Standing Expiration Date:   11/3/2025    Hemoglobin A1C     Standing Status:   Future     Standing Expiration Date:    12/3/2025

## 2024-09-23 RX ORDER — PRAVASTATIN SODIUM 80 MG/1
TABLET ORAL
Qty: 90 TABLET | Refills: 3 | Status: SHIPPED | OUTPATIENT
Start: 2024-09-23

## 2024-09-30 ENCOUNTER — EXTERNAL CHRONIC CARE MANAGEMENT (OUTPATIENT)
Dept: PRIMARY CARE CLINIC | Facility: CLINIC | Age: 79
End: 2024-09-30
Payer: MEDICARE

## 2024-09-30 PROCEDURE — G0511 CCM/BHI BY RHC/FQHC 20MIN MO: HCPCS | Mod: ,,, | Performed by: FAMILY MEDICINE

## 2024-10-08 ENCOUNTER — HOSPITAL ENCOUNTER (OUTPATIENT)
Dept: RADIOLOGY | Facility: HOSPITAL | Age: 79
Discharge: HOME | End: 2024-10-08
Attending: NURSE PRACTITIONER
Payer: MEDICARE

## 2024-10-08 ENCOUNTER — OFFICE VISIT (OUTPATIENT)
Dept: GASTROENTEROLOGY | Facility: CLINIC | Age: 79
End: 2024-10-08
Attending: SURGERY
Payer: MEDICARE

## 2024-10-08 VITALS
HEART RATE: 113 BPM | SYSTOLIC BLOOD PRESSURE: 111 MMHG | BODY MASS INDEX: 38.33 KG/M2 | WEIGHT: 283 LBS | OXYGEN SATURATION: 95 % | HEIGHT: 72 IN | DIASTOLIC BLOOD PRESSURE: 75 MMHG

## 2024-10-08 DIAGNOSIS — K21.9 GASTROESOPHAGEAL REFLUX DISEASE, UNSPECIFIED WHETHER ESOPHAGITIS PRESENT: ICD-10-CM

## 2024-10-08 DIAGNOSIS — K59.00 CONSTIPATION, UNSPECIFIED CONSTIPATION TYPE: ICD-10-CM

## 2024-10-08 DIAGNOSIS — R10.9 ABDOMINAL PAIN, UNSPECIFIED ABDOMINAL LOCATION: Primary | ICD-10-CM

## 2024-10-08 PROCEDURE — 99999 PR PBB SHADOW E&M-EST. PATIENT-LVL IV: CPT | Mod: PBBFAC,,, | Performed by: NURSE PRACTITIONER

## 2024-10-08 PROCEDURE — 99214 OFFICE O/P EST MOD 30 MIN: CPT | Mod: PBBFAC,25 | Performed by: NURSE PRACTITIONER

## 2024-10-08 PROCEDURE — 99214 OFFICE O/P EST MOD 30 MIN: CPT | Mod: S$PBB,,, | Performed by: NURSE PRACTITIONER

## 2024-10-08 PROCEDURE — 74018 RADEX ABDOMEN 1 VIEW: CPT | Mod: TC

## 2024-10-08 PROCEDURE — 74018 RADEX ABDOMEN 1 VIEW: CPT | Mod: 26,,, | Performed by: RADIOLOGY

## 2024-10-08 NOTE — PROGRESS NOTES
Diogenes Aguilar is a 79 y.o. male here for Constipation        PCP: Timothy Solomon III  Referring Provider: Roby Mcmahon Md  [address] [place] [id number]     HPI:  Presents due to abdominal pain.  Patient reports ongoing abdominal pain that is generalized and associated with constipation that has been ongoing for over a year.  Patient had ventral hernia repair per Dr. Mcmahon on 1624.  Reports that this did not improve his symptoms of abdominal pain.  Reports a previous small bowel obstruction that did require surgery with lysis of adhesions on 02/24/2023 per Dr. Beach.  Op note reviewed, extensive small bowel adhesions.  At that time abdominal pain was also associated with constipation.  No nausea or vomiting.  No fever.  Reports that he is taking a laxative every 3 days. He is using Milk of Magnesia. Has failed Miralax. Last colonoscopy was 11/09/2022, hyperplastic polyp.  Last EGD was 02/21/2023, report reviewed, no ulcers, mild erythema.  Reports that reflux is controlled on PPI.    Constipation  Associated symptoms include abdominal pain. Pertinent negatives include no diarrhea, fever, nausea or vomiting.         ROS:  Review of Systems   Constitutional:  Negative for activity change, appetite change, fatigue, fever and unexpected weight change.   HENT:  Negative for trouble swallowing.    Gastrointestinal:  Positive for abdominal distention, abdominal pain and constipation. Negative for anal bleeding, blood in stool, change in bowel habit, diarrhea, nausea, vomiting and reflux.   Musculoskeletal:  Negative for gait problem.   Integumentary:  Negative for color change.   Psychiatric/Behavioral:  Negative for sleep disturbance. The patient is not nervous/anxious.           PMHX:  has a past medical history of Carcinoma of prostate, Diverticula, colon (08/19/2021), Essential hypertension, GERD (gastroesophageal reflux disease), History of colon polyps (08/19/2021), Hyperlipidemia, Obesity, and Polyp  of ascending colon (08/19/2021).    PSHX:  has a past surgical history that includes Colonoscopy (08/23/2021); Prostatectomy; Umbilical hernia repair; Abdominal surgery (N/A); laparotomy, exploratory (N/A, 2/24/2023); arthroplasty, knee, total, using computer-assisted navigation (Right, 1/8/2024); Robot-assisted repair of ventral hernia using da Magdy Xi (N/A, 6/10/2024); and Robot-assisted laparoscopic lysis of adhesions using da Magdy Xi (6/10/2024).    PFHX: family history includes Breast cancer in his mother.    PSlHX:  reports that he has never smoked. He has never used smokeless tobacco. He reports current alcohol use. He reports that he does not use drugs.        Review of patient's allergies indicates:  No Known Allergies    Medication List with Changes/Refills   Current Medications    AMLODIPINE (NORVASC) 5 MG TABLET    Take 1 tablet (5 mg total) by mouth 2 (two) times daily.    ASPIRIN (ECOTRIN) 325 MG EC TABLET    Take 1 tablet (325 mg total) by mouth 2 (two) times a day.    GEMFIBROZIL (LOPID) 600 MG TABLET    TAKE ONE TABLET BY MOUTH ONCE DAILY WITH FOOD    LISINOPRIL (PRINIVIL,ZESTRIL) 20 MG TABLET    Take 1 tablet (20 mg total) by mouth once daily.    MELOXICAM (MOBIC) 7.5 MG TABLET    Take 1 tablet (7.5 mg total) by mouth once daily.    OMEPRAZOLE (PRILOSEC) 20 MG CAPSULE    TAKE ONE CAPSULE BY MOUTH ONCE DAILY    PRAVASTATIN (PRAVACHOL) 80 MG TABLET    TAKE ONE TABLET BY MOUTH ONCE DAILY WITH FOOD    VITAMIN D (VITAMIN D3) 1000 UNITS TAB    Take 2,500 Units by mouth once daily.        Objective Findings:  Vital Signs:  /75   Pulse (!) 113   Ht 6' (1.829 m)   Wt 128.4 kg (283 lb)   SpO2 95%   BMI 38.38 kg/m²  Body mass index is 38.38 kg/m².    Physical Exam:  Physical Exam  Vitals and nursing note reviewed.   Constitutional:       General: He is not in acute distress.     Appearance: Normal appearance.   HENT:      Mouth/Throat:      Mouth: Mucous membranes are moist.   Cardiovascular:       Rate and Rhythm: Normal rate.   Pulmonary:      Effort: Pulmonary effort is normal.   Abdominal:      General: Bowel sounds are normal. There is no distension.      Palpations: Abdomen is soft. There is no mass.      Tenderness: There is no abdominal tenderness.   Skin:     General: Skin is warm and dry.      Coloration: Skin is not jaundiced or pale.   Neurological:      Mental Status: He is alert and oriented to person, place, and time.   Psychiatric:         Mood and Affect: Mood normal.          Labs:  Lab Results   Component Value Date    WBC 7.12 09/04/2024    HGB 16.0 09/04/2024    HCT 49.9 09/04/2024    MCV 92.1 09/04/2024    RDW 14.0 09/04/2024     09/04/2024    LYMPH 45.9 (H) 09/04/2024    LYMPH 3.27 09/04/2024    MONO 11.0 (H) 09/04/2024    EOS 0.13 09/04/2024    BASO 0.06 09/04/2024     Lab Results   Component Value Date     (L) 09/04/2024    K 4.6 09/04/2024     09/04/2024    CO2 26 09/04/2024    GLU 92 09/04/2024    BUN 15 09/04/2024    CREATININE 1.25 10/08/2024    CALCIUM 9.4 09/04/2024    PROT 8.3 (H) 09/04/2024    ALBUMIN 3.9 09/04/2024    BILITOT 0.5 09/04/2024    ALKPHOS 107 09/04/2024    AST 21 09/04/2024    ALT 19 09/04/2024         Imaging: No results found.      Assessment:  Diogenes Aguilar is a 79 y.o. male here with:  1. Abdominal pain, unspecified abdominal location    2. Constipation, unspecified constipation type    3. Gastroesophageal reflux disease, unspecified whether esophagitis present          Recommendations:  1. KUB  2. CT abdomen and pelvis  3. May continue MOM    No follow-ups on file.      Order summary:  Orders Placed This Encounter    X-Ray KUB    CT Abdomen Pelvis With IV Contrast Routine Oral Contrast    Creatinine, Serum       Thank you for allowing me to participate in the care of Diogenes Aguilar.      KEISHA Wilson

## 2024-10-10 ENCOUNTER — TELEPHONE (OUTPATIENT)
Dept: GASTROENTEROLOGY | Facility: CLINIC | Age: 79
End: 2024-10-10
Payer: MEDICARE

## 2024-10-10 NOTE — TELEPHONE ENCOUNTER
Results called to patient. Verbalized understanding.            ----- Message from KONG Gorman sent at 10/9/2024 12:53 PM CDT -----  Abdominal x-ray is okay no obstruction.  Bowel gas pattern is within normal limits.

## 2024-10-24 ENCOUNTER — HOSPITAL ENCOUNTER (OUTPATIENT)
Dept: RADIOLOGY | Facility: HOSPITAL | Age: 79
Discharge: HOME | End: 2024-10-24
Attending: NURSE PRACTITIONER
Payer: MEDICARE

## 2024-10-24 ENCOUNTER — TELEPHONE (OUTPATIENT)
Dept: GASTROENTEROLOGY | Facility: CLINIC | Age: 79
End: 2024-10-24
Payer: MEDICARE

## 2024-10-24 DIAGNOSIS — R10.9 ABDOMINAL PAIN, UNSPECIFIED ABDOMINAL LOCATION: ICD-10-CM

## 2024-10-24 PROCEDURE — 74177 CT ABD & PELVIS W/CONTRAST: CPT | Mod: 26,,, | Performed by: RADIOLOGY

## 2024-10-24 PROCEDURE — 74177 CT ABD & PELVIS W/CONTRAST: CPT | Mod: TC

## 2024-10-24 PROCEDURE — 25500020 PHARM REV CODE 255: Performed by: NURSE PRACTITIONER

## 2024-10-24 PROCEDURE — A9698 NON-RAD CONTRAST MATERIALNOC: HCPCS | Performed by: NURSE PRACTITIONER

## 2024-10-24 RX ORDER — IOPAMIDOL 755 MG/ML
100 INJECTION, SOLUTION INTRAVASCULAR
Status: COMPLETED | OUTPATIENT
Start: 2024-10-24 | End: 2024-10-24

## 2024-10-24 RX ADMIN — IOPAMIDOL 100 ML: 755 INJECTION, SOLUTION INTRAVENOUS at 09:10

## 2024-10-24 RX ADMIN — BARIUM SULFATE 1150 ML: 20 SUSPENSION ORAL at 10:10

## 2024-10-24 NOTE — TELEPHONE ENCOUNTER
Results and recommendations called to patient. Verbalized understanding.                ----- Message from KONG Gorman sent at 10/24/2024 11:11 AM CDT -----  There is a short section of diet section in the right common iliac artery this is new since the last exam.  I did have Dr. Yong Pires's nurse practitioner review the CT.  He has an appointment with Dr. Beach on October 31st at 2:00 a.m..  Is very important that he attend this appointment.  For any increased abdominal pain or changes he should go to the ER.

## 2024-10-31 ENCOUNTER — OFFICE VISIT (OUTPATIENT)
Dept: VASCULAR SURGERY | Facility: CLINIC | Age: 79
End: 2024-10-31
Payer: MEDICARE

## 2024-10-31 VITALS — HEIGHT: 72 IN | BODY MASS INDEX: 38.34 KG/M2 | WEIGHT: 283.06 LBS

## 2024-10-31 DIAGNOSIS — K59.00 CONSTIPATION, UNSPECIFIED CONSTIPATION TYPE: ICD-10-CM

## 2024-10-31 DIAGNOSIS — I72.3 ILIAC ANEURYSM: Primary | ICD-10-CM

## 2024-10-31 PROCEDURE — 99999 PR PBB SHADOW E&M-EST. PATIENT-LVL III: CPT | Mod: PBBFAC,,, | Performed by: SURGERY

## 2024-10-31 PROCEDURE — 99213 OFFICE O/P EST LOW 20 MIN: CPT | Mod: PBBFAC | Performed by: SURGERY

## 2024-10-31 PROCEDURE — 99213 OFFICE O/P EST LOW 20 MIN: CPT | Mod: S$PBB,,, | Performed by: SURGERY

## 2024-11-08 ENCOUNTER — OFFICE VISIT (OUTPATIENT)
Dept: GASTROENTEROLOGY | Facility: CLINIC | Age: 79
End: 2024-11-08
Payer: MEDICARE

## 2024-11-08 VITALS
HEIGHT: 72 IN | WEIGHT: 280.81 LBS | OXYGEN SATURATION: 96 % | DIASTOLIC BLOOD PRESSURE: 55 MMHG | HEART RATE: 120 BPM | SYSTOLIC BLOOD PRESSURE: 99 MMHG | BODY MASS INDEX: 38.03 KG/M2

## 2024-11-08 DIAGNOSIS — K59.00 CONSTIPATION, UNSPECIFIED CONSTIPATION TYPE: Primary | ICD-10-CM

## 2024-11-08 DIAGNOSIS — R10.84 GENERALIZED ABDOMINAL PAIN: ICD-10-CM

## 2024-11-08 PROCEDURE — 99214 OFFICE O/P EST MOD 30 MIN: CPT | Mod: S$PBB,,, | Performed by: NURSE PRACTITIONER

## 2024-11-08 PROCEDURE — 99214 OFFICE O/P EST MOD 30 MIN: CPT | Mod: PBBFAC | Performed by: NURSE PRACTITIONER

## 2024-11-08 PROCEDURE — 99999 PR PBB SHADOW E&M-EST. PATIENT-LVL IV: CPT | Mod: PBBFAC,,, | Performed by: NURSE PRACTITIONER

## 2024-11-08 NOTE — PROGRESS NOTES
Diogenes Aguilar is a 79 y.o. male here for Follow-up        PCP: Timothy Solomon III  Referring Provider: Shayna Santizo, Fnp  [address] [place] [address] [place] [id number]     HPI:  Presents for follow-up after CT abdomen and pelvis.  This patient was discussed with Dr. Raman. CT scan was reviewed. 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  The patient was referred to Dr. Beach.  Dr. Beach did review the CT films, Dr. 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  No explanation for the patient's abdominal pain.  He does have a history of multiple abdominal surgeries and lysis of adhesions.  History of small-bowel obstructions. Chronic constipation, he has been taking MOM every 3 days. Recommend Miralax powder 17 grams twice daily, MOM every 3rd day.  Last colonoscopy was 11/09/2022, hyperplastic polyp removed.  He does have a history of tubular adenoma in 2021.  No hematochezia or melena.  Last EGD was 02/21/2023, report reviewed, gastric erythema. H pylori was equivocal. Stool was turned in in September of 2023 that was negative for H pylori.    Follow-up  Associated symptoms include abdominal pain. Pertinent negatives include no change in bowel habit, chest pain, fatigue, fever, nausea or vomiting.         ROS:  Review of Systems   Constitutional:  Negative for activity change, fatigue, fever and unexpected weight change.   HENT:  Negative for trouble swallowing.    Cardiovascular:  Negative for chest pain.    Gastrointestinal:  Positive for abdominal pain and constipation. Negative for abdominal distention, blood in stool, change in bowel habit, diarrhea, nausea, vomiting and reflux.   Musculoskeletal:  Negative for gait problem.   Integumentary:  Negative for color change.   Psychiatric/Behavioral:  Negative for sleep disturbance. The patient is not nervous/anxious.           PMHX:  has a past medical history of Carcinoma of prostate, Diverticula, colon (08/19/2021), Essential hypertension, GERD (gastroesophageal reflux disease), History of colon polyps (08/19/2021), Hyperlipidemia, Obesity, and Polyp of ascending colon (08/19/2021).    PSHX:  has a past surgical history that includes Colonoscopy (08/23/2021); Prostatectomy; Umbilical hernia repair; Abdominal surgery (N/A); laparotomy, exploratory (N/A, 2/24/2023); arthroplasty, knee, total, using computer-assisted navigation (Right, 1/8/2024); Robot-assisted repair of ventral hernia using da Magdy Xi (N/A, 6/10/2024); and Robot-assisted laparoscopic lysis of adhesions using da Magdy Xi (6/10/2024).    PFHX: family history includes Breast cancer in his mother.    PSlHX:  reports that he has never smoked. He has never used smokeless tobacco. He reports current alcohol use. He reports that he does not use drugs.        Review of patient's allergies indicates:  No Known Allergies    Medication List with Changes/Refills   Current Medications    AMLODIPINE (NORVASC) 5 MG TABLET    Take 1 tablet (5 mg total) by mouth 2 (two) times daily.    ASPIRIN (ECOTRIN) 325 MG EC TABLET    Take 1 tablet (325 mg total) by mouth 2 (two) times a day.    ASPIRIN/SALICYLAMIDE/CAFFEINE (BC HEADACHE POWDER ORAL)    Take by mouth.    GEMFIBROZIL (LOPID) 600 MG TABLET    TAKE ONE TABLET BY MOUTH ONCE DAILY WITH FOOD    LISINOPRIL (PRINIVIL,ZESTRIL) 20 MG TABLET    Take 1 tablet (20 mg total) by mouth once daily.    MELOXICAM (MOBIC) 7.5 MG TABLET    Take 1 tablet (7.5 mg total) by mouth once  daily.    OMEPRAZOLE (PRILOSEC) 20 MG CAPSULE    TAKE ONE CAPSULE BY MOUTH ONCE DAILY    PRAVASTATIN (PRAVACHOL) 80 MG TABLET    TAKE ONE TABLET BY MOUTH ONCE DAILY WITH FOOD    VITAMIN D (VITAMIN D3) 1000 UNITS TAB    Take 2,500 Units by mouth once daily.        Objective Findings:  Vital Signs:  BP (!) 99/55   Pulse (!) 120   Ht 6' (1.829 m)   Wt 127.4 kg (280 lb 12.8 oz)   SpO2 96%   BMI 38.08 kg/m²  Body mass index is 38.08 kg/m².    Physical Exam:  Physical Exam  Vitals and nursing note reviewed.   Constitutional:       General: He is not in acute distress.     Appearance: Normal appearance. He is obese.   HENT:      Mouth/Throat:      Mouth: Mucous membranes are moist.   Cardiovascular:      Rate and Rhythm: Normal rate.   Pulmonary:      Effort: Pulmonary effort is normal.   Abdominal:      General: Bowel sounds are normal. There is no distension.      Palpations: Abdomen is soft. There is no mass.      Tenderness: There is no abdominal tenderness.   Skin:     General: Skin is warm and dry.      Coloration: Skin is not jaundiced or pale.   Neurological:      Mental Status: He is alert and oriented to person, place, and time.   Psychiatric:         Mood and Affect: Mood normal.          Labs:  Lab Results   Component Value Date    WBC 7.12 09/04/2024    HGB 16.0 09/04/2024    HCT 49.9 09/04/2024    MCV 92.1 09/04/2024    RDW 14.0 09/04/2024     09/04/2024    LYMPH 45.9 (H) 09/04/2024    LYMPH 3.27 09/04/2024    MONO 11.0 (H) 09/04/2024    EOS 0.13 09/04/2024    BASO 0.06 09/04/2024     Lab Results   Component Value Date     (L) 09/04/2024    K 4.6 09/04/2024     09/04/2024    CO2 26 09/04/2024    GLU 92 09/04/2024    BUN 15 09/04/2024    CREATININE 1.25 10/08/2024    CALCIUM 9.4 09/04/2024    PROT 8.3 (H) 09/04/2024    ALBUMIN 3.9 09/04/2024    BILITOT 0.5 09/04/2024    ALKPHOS 107 09/04/2024    AST 21 09/04/2024    ALT 19 09/04/2024         Imaging: CT Abdomen Pelvis With IV Contrast  Routine Oral Contrast    Result Date: 10/24/2024  EXAMINATION: CT ABDOMEN PELVIS WITH IV CONTRAST CLINICAL HISTORY: Abdominal pain, acute, nonlocalized; Unspecified abdominal pain TECHNIQUE: Low dose axial images, sagittal and coronal reformations were obtained from the lung bases to the pubic symphysis following the IV administration of 100 mL of Isovue 370 and the oral administration of 1150 cc of dilute Readi-Cat. COMPARISON: 02/22/2023 FINDINGS: Images of the lower thorax are remarkable for bilateral dependent atelectasis.  There is bilateral posterior pleural thickening.  The heart is not enlarged. The liver is hypoattenuating suggesting steatosis, correlation with LFTs recommended.  The spleen,, adrenal glands and gallbladder are grossly unremarkable.  There is atrophic change of the pancreas without pancreatic ductal dilation.  There is residual Oral contrast within the gastric lumen without gastric wall thickening.  The portal vein, splenic vein, SMV, celiac axis and SMA all are patent.  No significant abdominal lymphadenopathy. The kidneys enhance symmetrically without hydronephrosis or nephrolithiasis.  Low attenuating lesions arise from the kidneys, largest within the interpolar region of the left kidney measures 2.5 cm with attenuation suggesting cyst.  Others are too small for characterization.  The bilateral ureters are unremarkable without calculi seen.  The urinary bladder is unremarkable.  There is surgical change of prostatectomy.  There is penile implant. There are a few scattered colonic diverticula without surrounding inflammation to suggest diverticulitis.  Oral contrast reaches the transverse colon.  The terminal ileum is unremarkable.  The appendix is not confidently identified, no pericecal inflammation.  The small bowel is grossly unremarkable.  There are a few scattered shotty periaortic, pericaval, and mesenteric lymph nodes.  There is atherosclerotic calcification of the aorta and its  branches.  There is aneurysmal dilation of the right common iliac artery noting short segment dissection, new since the previous exam.  No occlusion.  The right external and internal iliac arteries are patent.  No focal organized pelvic fluid collection. There are degenerative changes of the bilateral sacroiliac joints and spine.  There is osteopenia.  No significant inguinal lymphadenopathy.     This report was flagged in Epic as abnormal. 1. Findings suggest hepatic steatosis, correlation with LFTs recommended. 2. 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  Correlation and follow-up is advised. 3. Colonic diverticulosis without diverticulitis. 4. Please see above for several additional findings. Electronically signed by: Yordan Becker MD Date:    10/24/2024 Time:    10:21        Assessment:  Diogenes Aguilar is a 79 y.o. male here with:  1. Constipation, unspecified constipation type    2. Generalized abdominal pain          Recommendations:  1. MiraLax powder 17 g in 8 oz of liquid twice daily, MOM every 3 rd day  2. If patient continues to have difficulty with constipation, abdominal pain.  May consider repeat colonoscopy due to previous history of tubular adenoma.    No follow-ups on file.      Order summary:       Thank you for allowing me to participate in the care of Diogenes Aguilar.      KEISHA Wilson

## 2024-11-19 ENCOUNTER — OFFICE VISIT (OUTPATIENT)
Dept: FAMILY MEDICINE | Facility: CLINIC | Age: 79
End: 2024-11-19
Payer: MEDICARE

## 2024-11-19 VITALS
HEIGHT: 72 IN | DIASTOLIC BLOOD PRESSURE: 73 MMHG | RESPIRATION RATE: 18 BRPM | TEMPERATURE: 98 F | BODY MASS INDEX: 38.17 KG/M2 | HEART RATE: 94 BPM | WEIGHT: 281.81 LBS | OXYGEN SATURATION: 96 % | SYSTOLIC BLOOD PRESSURE: 108 MMHG

## 2024-11-19 DIAGNOSIS — Z23 FLU VACCINE NEED: ICD-10-CM

## 2024-11-19 DIAGNOSIS — Z23 NEED FOR PNEUMOCOCCAL VACCINE: ICD-10-CM

## 2024-11-19 DIAGNOSIS — E78.2 MIXED HYPERLIPIDEMIA: Chronic | ICD-10-CM

## 2024-11-19 DIAGNOSIS — N18.31 CHRONIC KIDNEY DISEASE, STAGE 3A: Chronic | ICD-10-CM

## 2024-11-19 DIAGNOSIS — I10 ESSENTIAL HYPERTENSION: Primary | Chronic | ICD-10-CM

## 2024-11-19 PROBLEM — R11.0 NAUSEA: Status: RESOLVED | Noted: 2023-02-23 | Resolved: 2024-11-19

## 2024-11-19 PROBLEM — Z12.11 SCREENING FOR COLON CANCER: Status: RESOLVED | Noted: 2021-07-20 | Resolved: 2024-11-19

## 2024-11-19 PROBLEM — K21.9 GASTROESOPHAGEAL REFLUX DISEASE WITHOUT ESOPHAGITIS: Chronic | Status: ACTIVE | Noted: 2023-02-21

## 2024-11-19 PROBLEM — R10.9 ABDOMINAL PAIN: Status: RESOLVED | Noted: 2024-10-08 | Resolved: 2024-11-19

## 2024-11-19 PROBLEM — K56.600 PARTIAL SMALL BOWEL OBSTRUCTION: Status: RESOLVED | Noted: 2023-02-23 | Resolved: 2024-11-19

## 2024-11-19 LAB
CREAT UR-MCNC: 93 MG/DL (ref 23–375)
MICROALBUMIN UR-MCNC: <5 MG/DL (ref 0–2.8)
MICROALBUMIN/CREAT RATIO PNL UR: <53.8 MG/G (ref 0–30)

## 2024-11-19 PROCEDURE — 99214 OFFICE O/P EST MOD 30 MIN: CPT | Mod: ,,, | Performed by: NURSE PRACTITIONER

## 2024-11-19 PROCEDURE — 82043 UR ALBUMIN QUANTITATIVE: CPT | Mod: ,,, | Performed by: CLINICAL MEDICAL LABORATORY

## 2024-11-19 PROCEDURE — G0008 ADMIN INFLUENZA VIRUS VAC: HCPCS | Mod: ,,, | Performed by: NURSE PRACTITIONER

## 2024-11-19 PROCEDURE — 82570 ASSAY OF URINE CREATININE: CPT | Mod: ,,, | Performed by: CLINICAL MEDICAL LABORATORY

## 2024-11-19 PROCEDURE — G0009 ADMIN PNEUMOCOCCAL VACCINE: HCPCS | Mod: ,,, | Performed by: NURSE PRACTITIONER

## 2024-11-19 PROCEDURE — 90732 PPSV23 VACC 2 YRS+ SUBQ/IM: CPT | Mod: ,,, | Performed by: NURSE PRACTITIONER

## 2024-11-19 PROCEDURE — 90653 IIV ADJUVANT VACCINE IM: CPT | Mod: ,,, | Performed by: NURSE PRACTITIONER

## 2024-11-19 NOTE — PROGRESS NOTES
[facility]  [address] [place] [address]  Phone: [phone]  Fax: [phone]       PATIENT NAME: Diogenes Aguilar   : 1945    AGE: 79 y.o. DATE OF ENCOUNTER: 24    MRN: [id number]      PCP: Stella Esparza FNP    Subjective:     Reason for Visit / Chief Complaint:     274}  Chief Complaint   Patient presents with    Establish Care     Patient reports to the clinic to establish care.    Health Maintenance     Care gaps addressed, patient would like his Flu vaccine, has an appointment for his eye exam soon.    Marilu Gao CMA     Presents to establish new PCP since Dr. Timothy Solomon III left primary care.   Hx of prostate cancer age 52 with PSA undetectable since treatment.    Takes a BC 1-2x/wk for arthralgias.    Review of Systems:     Review of Systems   Constitutional: Negative.    HENT: Negative.     Eyes: Negative.    Respiratory: Negative.     Cardiovascular: Negative.    Gastrointestinal: Negative.    Endocrine: Negative.    Genitourinary: Negative.    Musculoskeletal:  Positive for arthralgias.   Skin: Negative.    Allergic/Immunologic: Negative.    Neurological: Negative.    Hematological: Negative.    Psychiatric/Behavioral: Negative.         Allergies and Meds: 274}     Review of patient's allergies indicates:  No Known Allergies     Current Outpatient Medications   Medication Sig Dispense Refill    amLODIPine (NORVASC) 5 MG tablet Take 1 tablet (5 mg total) by mouth 2 (two) times daily. 180 tablet 3    aspirin/salicylamide/caffeine (BC HEADACHE POWDER ORAL) Take by mouth.      gemfibroziL (LOPID) 600 MG tablet TAKE ONE TABLET BY MOUTH ONCE DAILY WITH FOOD 90 tablet 3    lisinopriL (PRINIVIL,ZESTRIL) 20 MG tablet Take 1 tablet (20 mg total) by mouth once daily. 90 tablet 3    omeprazole (PRILOSEC) 20 MG capsule TAKE ONE CAPSULE BY MOUTH ONCE DAILY 90 capsule 3    pravastatin (PRAVACHOL) 80 MG tablet TAKE ONE TABLET BY MOUTH ONCE DAILY WITH  FOOD 90 tablet 3    vitamin D (VITAMIN D3) 1000 units Tab Take 2,500 Units by mouth once daily.       No current facility-administered medications for this visit.       Labs:274}   I have reviewed labs below:    Lab Results   Component Value Date    WBC 7.12 09/04/2024    RBC 5.42 09/04/2024    HGB 16.0 09/04/2024    HCT 49.9 09/04/2024     09/04/2024     (L) 09/04/2024    K 4.6 09/04/2024     09/04/2024    CALCIUM 9.4 09/04/2024    GLU 92 09/04/2024    BUN 15 09/04/2024    CREATININE 1.25 10/08/2024    EGFRNONAA 84 07/11/2022    ALT 19 09/04/2024    AST 21 09/04/2024    INR 1.07 01/02/2024    INR 1.07 01/02/2024    CHOL 157 09/04/2024    TRIG 97 09/04/2024    HDL 45 09/04/2024    LDLCALC 93 09/04/2024    TSH 2.570 09/04/2024    PSA <0.010 09/04/2024    HGBA1C 5.3 09/04/2024       Medical History: 274}     Past Medical History:   Diagnosis Date    Carcinoma of prostate     Diverticula, colon 08/19/2021    Essential hypertension     GERD (gastroesophageal reflux disease)     History of colon polyps 08/19/2021    Hyperlipidemia     Obesity     Partial small bowel obstruction 02/23/2023    Polyp of ascending colon 08/19/2021      Social History     Tobacco Use   Smoking Status Never   Smokeless Tobacco Never      Past Surgical History:   Procedure Laterality Date    ABDOMINAL SURGERY N/A     blockage of the bowels    ARTHROPLASTY, KNEE, TOTAL, USING COMPUTER-ASSISTED NAVIGATION Right 1/8/2024    Procedure: ARTHROPLASTY, KNEE, TOTAL, USING COMPUTER-ASSISTED NAVIGATION;  Surgeon: Bill Avila III, MD;  Location: [facility] OR;  Service: Orthopedics;  Laterality: Right;    COLONOSCOPY  08/23/2021    repeat 1 year    LAPAROTOMY, EXPLORATORY N/A 2/24/2023    Procedure: LAPAROTOMY, EXPLORATORY;  Surgeon: Kaleigh Beach MD;  Location: [facility] OR;  Service: General;  Laterality: N/A;  mesh removal    PROSTATECTOMY      ROBOT-ASSISTED LAPAROSCOPIC LYSIS OF ADHESIONS USING DA VICKY XI  6/10/2024     Procedure: XI ROBOTIC LYSIS, ADHESIONS;  Surgeon: Roby Mcmahon MD;  Location: [facility] OR;  Service: General;;    ROBOT-ASSISTED REPAIR OF VENTRAL HERNIA USING DA VICKY XI N/A 6/10/2024    Procedure: XI ROBOTIC REPAIR, HERNIA, VENTRAL;  Surgeon: Roby Mcmahon MD;  Location: [facility] OR;  Service: General;  Laterality: N/A;    UMBILICAL HERNIA REPAIR      x 3         Health Maintenance:      Health Maintenance Topics with due status: Not Due       Topic Last Completion Date    Colonoscopy 11/09/2022    Lipid Panel 09/04/2024    Hemoglobin A1c 09/04/2024       Objective:  274}   Vital Signs  Temp: 98.1 °F (36.7 °C)  Temp Source: Oral  Pulse: 94  Resp: 18  SpO2: 96 %  BP: 108/73  BP Location: Left arm  Patient Position: Sitting  Pain Score: 0-No pain  Height and Weight  Height: 6' (182.9 cm)  Weight: 127.8 kg (281 lb 12.8 oz)  BSA (Calculated - sq m): 2.55 sq meters  BMI (Calculated): 38.2  Weight in (lb) to have BMI = 25: 183.9    Over the last two weeks how often have you been bothered by little interest or pleasure in doing things: 0  Over the last two weeks how often have you been bothered by feeling down, depressed or hopeless: 0  PHQ-2 Total Score: 0    Wt Readings from Last 3 Encounters:   11/19/24 127.8 kg (281 lb 12.8 oz)   11/08/24 127.4 kg (280 lb 12.8 oz)   10/31/24 128.4 kg (283 lb 1.1 oz)     Physical Exam  Vitals and nursing note reviewed.   Constitutional:       General: He is not in acute distress.     Appearance: Normal appearance. He is obese. He is not ill-appearing.   HENT:      Head: Normocephalic.      Right Ear: Tympanic membrane, ear canal and external ear normal.      Left Ear: Tympanic membrane, ear canal and external ear normal.      Nose: Nose normal.      Mouth/Throat:      Mouth: Mucous membranes are moist.      Pharynx: Oropharynx is clear. Uvula midline. No posterior oropharyngeal erythema or uvula swelling.   Eyes:      Conjunctiva/sclera: Conjunctivae normal.      Pupils:  Pupils are equal, round, and reactive to light.   Neck:      Thyroid: No thyromegaly.      Vascular: Normal carotid pulses. No carotid bruit.   Cardiovascular:      Rate and Rhythm: Normal rate and regular rhythm.      Pulses: Normal pulses.      Heart sounds: Normal heart sounds.   Pulmonary:      Effort: Pulmonary effort is normal. No respiratory distress.      Breath sounds: Normal breath sounds. No wheezing, rhonchi or rales.   Abdominal:      Palpations: Abdomen is soft.      Tenderness: There is no abdominal tenderness.   Musculoskeletal:      Cervical back: Neck supple.      Right lower leg: No edema.      Left lower leg: Edema (trace) present.   Lymphadenopathy:      Cervical: No cervical adenopathy.   Skin:     General: Skin is warm and dry.      Coloration: Skin is not jaundiced or pale.   Neurological:      General: No focal deficit present.      Mental Status: He is alert and oriented to person, place, and time.      Gait: Gait normal.   Psychiatric:         Mood and Affect: Mood normal.         Behavior: Behavior normal.          Assessment and Plan: 274}     1. Essential hypertension  Assessment & Plan:  Controlled  Continue amlodipine 5 mg daily and lisinopril 20 mg daily.      2. Flu vaccine need  -     influenza (adjuvanted) (Fluad) 45 mcg/0.5 mL IM vaccine (> or = 66 yo) 0.5 mL    3. Chronic kidney disease, stage 3a  Assessment & Plan:   Latest Reference Range & Units 01/08/24 11:39 01/09/24 05:14 06/03/24 15:19 09/04/24 08:25 10/08/24 11:24   BUN 7 - 18 mg/dL 20 (H) 13 28 (H) 15    Creatinine 0.70 - 1.30 mg/dL 1.05 0.88 1.43 (H) 1.26 1.25   BUN/CREAT RATIO 6 - 20  19 15 20 12    eGFR >=60 mL/min/1.73m2 74 89 50 (L) 59 (L) 59 (L)     eGFR < 60 on past 2 checks > 3-mth apart consistent with stage 3A CKD  Check urine for microalbumin creatinine ratio today.  Has care gaps open for T2DM but patient does not have a history of T2DM.    Advised to stay well hydrated and avoid NSAIDs.  Meloxicam was on  his med list but reports he is not taking this and only takes APC about 1-2 times per week.  Encouraged avoiding NSAIDs and other nephrotoxins.  Discussed previous results from 2 months ago and we will recheck renal function again next visit.    Orders:  -     Microalbumin/Creatinine Ratio, Urine; Future; Expected date: 11/19/2024    4. Need for pneumococcal vaccine  -     pneumococcal vaccine (PNEUMOVAX-23) injection 0.5 mL    5. Mixed hyperlipidemia  Assessment & Plan:  Well controlled   Continue pravastatin 80 mg and gemfibrozil.        Diagnosis, risks, benefits, and side effects of any meds and treatment plan were discussed with the patient.  Patient to call or follow-up with any new or worsening symptoms or problems prior to next appointment.  All questions were answered to the satisfaction of the patient, and pt verbalized understanding and agreement to treatment plan.      Follow up in about 3 months (around 3/5/2025) for hypertension, CKD, hyperlipidemia, with nonfasting lab.    Signature:  KONG Mccallum-BC    Future Appointments   Date Time Provider Department Center   12/23/2024  9:00 AM AWGRACIE NURSE, [facility] FAMILY MEDICINE [facility] [place]   2/13/2025 10:00 AM Shayna Santizo FNP [facility]   3/4/2025 11:00 AM Stella Esparza FNP [facility] [place]   4/23/2025  8:30 AM RUS ROGERIO VILLAREAL [facility]   4/23/2025  8:45 AM Kaleigh Beach MD [facility]

## 2024-11-19 NOTE — ASSESSMENT & PLAN NOTE
Latest Reference Range & Units 01/08/24 11:39 01/09/24 05:14 06/03/24 15:19 09/04/24 08:25 10/08/24 11:24   BUN 7 - 18 mg/dL 20 (H) 13 28 (H) 15    Creatinine 0.70 - 1.30 mg/dL 1.05 0.88 1.43 (H) 1.26 1.25   BUN/CREAT RATIO 6 - 20  19 15 20 12    eGFR >=60 mL/min/1.73m2 74 89 50 (L) 59 (L) 59 (L)     eGFR < 60 on past 2 checks > 3-mth apart consistent with stage 3A CKD  Check urine for microalbumin creatinine ratio today.  Has care gaps open for T2DM but patient does not have a history of T2DM.    Advised to stay well hydrated and avoid NSAIDs.  Meloxicam was on his med list but reports he is not taking this and only takes APC about 1-2 times per week.  Encouraged avoiding NSAIDs and other nephrotoxins.  Discussed previous results from 2 months ago and we will recheck renal function again next visit.

## 2024-11-22 NOTE — PROGRESS NOTES
Call patient and review results. Mild MA consistent with history of CKD.  Will continue yearly monitoring.

## 2025-02-13 ENCOUNTER — OFFICE VISIT (OUTPATIENT)
Dept: GASTROENTEROLOGY | Facility: CLINIC | Age: 80
End: 2025-02-13
Payer: MEDICARE

## 2025-02-13 VITALS
WEIGHT: 290.19 LBS | HEART RATE: 114 BPM | OXYGEN SATURATION: 96 % | BODY MASS INDEX: 39.31 KG/M2 | HEIGHT: 72 IN | DIASTOLIC BLOOD PRESSURE: 73 MMHG | SYSTOLIC BLOOD PRESSURE: 118 MMHG

## 2025-02-13 DIAGNOSIS — K21.9 GASTROESOPHAGEAL REFLUX DISEASE WITHOUT ESOPHAGITIS: Chronic | ICD-10-CM

## 2025-02-13 DIAGNOSIS — K76.0 HEPATIC STEATOSIS: ICD-10-CM

## 2025-02-13 DIAGNOSIS — K59.00 CONSTIPATION, UNSPECIFIED CONSTIPATION TYPE: Primary | ICD-10-CM

## 2025-02-13 PROCEDURE — 99999 PR PBB SHADOW E&M-EST. PATIENT-LVL IV: CPT | Mod: PBBFAC,,, | Performed by: NURSE PRACTITIONER

## 2025-02-13 PROCEDURE — 99214 OFFICE O/P EST MOD 30 MIN: CPT | Mod: S$PBB,,, | Performed by: NURSE PRACTITIONER

## 2025-02-13 PROCEDURE — 99214 OFFICE O/P EST MOD 30 MIN: CPT | Mod: PBBFAC | Performed by: NURSE PRACTITIONER

## 2025-02-13 NOTE — PROGRESS NOTES
Diogenes Aguilar is a 79 y.o. male here for Follow-up        PCP: Stella Esparza  Referring Provider: Shayna Santizo, Meena  [address] [place] [address] [place] [id number]     HPI:  Presents for follow-up due to abdominal pain and constipation. 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  The patient was referred to Dr. Beach.  Dr. Beach did review the CT films, Dr. 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 Dr. Beach will follow up with repeat ultrasound in 6 months. CT did show hepatic steatosis. HR in the office is 114. He states that his heart rate is usually in the 90's. Recommend consideration for cardiology evaluation. Patient states that he will discuss evaluation with Dr. Hagen who is a personal friend. He does have a history of multiple abdominal surgeries and lysis of adhesions. Reports that he is taking Miralax powder daily and that constipation has improved. Reports that he is feeling better. Discussed that due to fatty liver that we recommend weight loss and exercise and a diet low in saturated fats and carbohydrates. Last colonoscopy 11/9/22, hyperplastic polyp. He has a history of adenomatous polyp in 2021.  We will recommend repeat colonoscopy 2027.     Follow-up  Pertinent negatives include no abdominal pain, change in bowel habit, chest pain, fatigue, fever, nausea or vomiting.         ROS:  Review of Systems   Constitutional:  Negative for activity change, fatigue, fever and unexpected weight change.   HENT:  Negative for trouble swallowing.    Cardiovascular:  Negative for chest pain.   Gastrointestinal:  Negative for abdominal distention,  abdominal pain, blood in stool, change in bowel habit, constipation, diarrhea, nausea, vomiting and reflux.   Musculoskeletal:  Negative for gait problem.   Integumentary:  Negative for color change.   Psychiatric/Behavioral:  The patient is not nervous/anxious.           PMHX:  has a past medical history of Carcinoma of prostate, Diverticula, colon (08/19/2021), Essential hypertension, GERD (gastroesophageal reflux disease), History of colon polyps (08/19/2021), Hyperlipidemia, Obesity, Partial small bowel obstruction (02/23/2023), and Polyp of ascending colon (08/19/2021).    PSHX:  has a past surgical history that includes Colonoscopy (08/23/2021); Prostatectomy; Umbilical hernia repair; Abdominal surgery (N/A); laparotomy, exploratory (N/A, 2/24/2023); arthroplasty, knee, total, using computer-assisted navigation (Right, 1/8/2024); Robot-assisted repair of ventral hernia using da Magdy Xi (N/A, 6/10/2024); and Robot-assisted laparoscopic lysis of adhesions using da Magdy Xi (6/10/2024).    PFHX: family history includes Breast cancer in his mother.    PSlHX:  reports that he has never smoked. He has never used smokeless tobacco. He reports current alcohol use. He reports that he does not use drugs.        Review of patient's allergies indicates:  No Known Allergies    Medication List with Changes/Refills   Current Medications    AMLODIPINE (NORVASC) 5 MG TABLET    Take 1 tablet (5 mg total) by mouth 2 (two) times daily.    ASPIRIN/SALICYLAMIDE/CAFFEINE (BC HEADACHE POWDER ORAL)    Take by mouth.    GEMFIBROZIL (LOPID) 600 MG TABLET    TAKE ONE TABLET BY MOUTH ONCE DAILY WITH FOOD    LISINOPRIL (PRINIVIL,ZESTRIL) 20 MG TABLET    Take 1 tablet (20 mg total) by mouth once daily.    OMEPRAZOLE (PRILOSEC) 20 MG CAPSULE    TAKE ONE CAPSULE BY MOUTH ONCE DAILY    PRAVASTATIN (PRAVACHOL) 80 MG TABLET    TAKE ONE TABLET BY MOUTH ONCE DAILY WITH FOOD    VITAMIN D (VITAMIN D3) 1000 UNITS TAB    Take 2,500 Units by mouth  once daily.        Objective Findings:  Vital Signs:  /73   Pulse (!) 114   Ht 6' (1.829 m)   Wt 131.6 kg (290 lb 3.2 oz)   SpO2 96%   BMI 39.36 kg/m²  Body mass index is 39.36 kg/m².    Physical Exam:  Physical Exam  Vitals and nursing note reviewed.   Constitutional:       General: He is not in acute distress.     Appearance: Normal appearance.   HENT:      Mouth/Throat:      Mouth: Mucous membranes are moist.   Cardiovascular:      Rate and Rhythm: Tachycardia present.   Pulmonary:      Effort: Pulmonary effort is normal.      Breath sounds: No wheezing, rhonchi or rales.   Abdominal:      General: Bowel sounds are normal. There is no distension.      Palpations: Abdomen is soft. There is no mass.      Tenderness: There is no abdominal tenderness.   Skin:     General: Skin is warm and dry.      Coloration: Skin is not jaundiced or pale.   Neurological:      Mental Status: He is alert and oriented to person, place, and time.   Psychiatric:         Mood and Affect: Mood normal.          Labs:  Lab Results   Component Value Date    WBC 7.12 09/04/2024    HGB 16.0 09/04/2024    HCT 49.9 09/04/2024    MCV 92.1 09/04/2024    RDW 14.0 09/04/2024     09/04/2024    LYMPH 45.9 (H) 09/04/2024    LYMPH 3.27 09/04/2024    MONO 11.0 (H) 09/04/2024    EOS 0.13 09/04/2024    BASO 0.06 09/04/2024     Lab Results   Component Value Date     (L) 09/04/2024    K 4.6 09/04/2024     09/04/2024    CO2 26 09/04/2024    GLU 92 09/04/2024    BUN 15 09/04/2024    CREATININE 1.25 10/08/2024    CALCIUM 9.4 09/04/2024    PROT 8.3 (H) 09/04/2024    ALBUMIN 3.9 09/04/2024    BILITOT 0.5 09/04/2024    ALKPHOS 107 09/04/2024    AST 21 09/04/2024    ALT 19 09/04/2024         Imaging: No results found.      Assessment:  Diogenes Aguilar is a 79 y.o. male here with:  1. Constipation, unspecified constipation type    2. Hepatic steatosis    3. Gastroesophageal reflux disease without esophagitis          Recommendations:  1.  "Continue MiraLax powder daily  2. Exercise 150 minutes per week  Weight loss of 7-10%. Weight loss should be gradual  Diet low in saturated fats and carbohydrates  Good glucose and cholesterol control  3. Avoid spicy, greasy foods  4.Advised to consider evaluation with Cardiology.  Patient states that he will speak to Dr. Hagen cardiologist who is a friend of his  5. Will need repeat colonoscopy in 2027    Portions of this note may have been created with voice recognition software.  Occasional wrong word or "sound a like substitutions may have occurred due to inherent limitations of voice recognition software.  Please read the note carefully and recognize using contexts, where substitutions have occurred.    Diagnosis, risks, benefits, and side effects of any medications and treatment plan were discussed with the patient.  All questions were answered to the satisfaction of the patient, and patient verbalized understanding and agreement to the treatment plan.        Follow up in about 1 year (around 2/13/2026).      Order summary:       Thank you for allowing me to participate in the care of Diogenes Aguilar.      KEISHA Wilson          "

## 2025-03-04 ENCOUNTER — OFFICE VISIT (OUTPATIENT)
Dept: FAMILY MEDICINE | Facility: CLINIC | Age: 80
End: 2025-03-04
Payer: MEDICARE

## 2025-03-04 VITALS
WEIGHT: 291 LBS | HEART RATE: 98 BPM | DIASTOLIC BLOOD PRESSURE: 67 MMHG | SYSTOLIC BLOOD PRESSURE: 99 MMHG | TEMPERATURE: 98 F | HEIGHT: 72 IN | OXYGEN SATURATION: 96 % | BODY MASS INDEX: 39.42 KG/M2

## 2025-03-04 DIAGNOSIS — N18.31 CHRONIC KIDNEY DISEASE, STAGE 3A: ICD-10-CM

## 2025-03-04 DIAGNOSIS — E78.2 MIXED HYPERLIPIDEMIA: Chronic | ICD-10-CM

## 2025-03-04 DIAGNOSIS — I10 ESSENTIAL HYPERTENSION: Primary | Chronic | ICD-10-CM

## 2025-03-04 DIAGNOSIS — C61 CARCINOMA OF PROSTATE: ICD-10-CM

## 2025-03-04 DIAGNOSIS — E66.01 MORBID OBESITY DUE TO EXCESS CALORIES: ICD-10-CM

## 2025-03-04 DIAGNOSIS — K21.9 GASTROESOPHAGEAL REFLUX DISEASE WITHOUT ESOPHAGITIS: Chronic | ICD-10-CM

## 2025-03-04 PROCEDURE — 99214 OFFICE O/P EST MOD 30 MIN: CPT | Mod: ,,, | Performed by: NURSE PRACTITIONER

## 2025-03-04 RX ORDER — OMEPRAZOLE 20 MG/1
20 CAPSULE, DELAYED RELEASE ORAL DAILY
Qty: 90 CAPSULE | Refills: 3 | Status: SHIPPED | OUTPATIENT
Start: 2025-03-04

## 2025-03-04 RX ORDER — LISINOPRIL 20 MG/1
20 TABLET ORAL DAILY
Qty: 90 TABLET | Refills: 3 | Status: SHIPPED | OUTPATIENT
Start: 2025-03-04

## 2025-03-04 RX ORDER — GEMFIBROZIL 600 MG/1
600 TABLET, FILM COATED ORAL DAILY
Qty: 90 TABLET | Refills: 3 | Status: SHIPPED | OUTPATIENT
Start: 2025-03-04

## 2025-03-04 RX ORDER — AMLODIPINE BESYLATE 5 MG/1
5 TABLET ORAL 2 TIMES DAILY
Qty: 180 TABLET | Refills: 3 | Status: SHIPPED | OUTPATIENT
Start: 2025-03-04

## 2025-03-04 NOTE — PROGRESS NOTES
[facility]  [address] [place] [address]  Phone: [phone]  Fax: [phone]       PATIENT NAME: Diogenes Aguilar   : 1945    AGE: 80 y.o. DATE OF ENCOUNTER: 3/4/25    MRN: [id number]      PCP: Stella Esparza FNP    Subjective:   CHANGE CHIEF COMPLAINT      :[id number]}274}  Chief Complaint   Patient presents with    Follow-up     3m fu      Patient presents for 3-mth follow-up hypertension and is doing well. His kidney function has been stable, with the GFR in the upper 50 range for the past year.  He denies having any problems or issues when directly asked by the healthcare provider.       Patient does not have history of type 2 diabetes.    Denies chest pain, SOB, dizziness, or headaches.    Allergies and Meds: 274}     Review of patient's allergies indicates:  No Known Allergies     Current Outpatient Medications   Medication Sig Dispense Refill    aspirin/salicylamide/caffeine (BC HEADACHE POWDER ORAL) Take by mouth.      pravastatin (PRAVACHOL) 80 MG tablet TAKE ONE TABLET BY MOUTH ONCE DAILY WITH FOOD 90 tablet 3    vitamin D (VITAMIN D3) 1000 units Tab Take 2,500 Units by mouth once daily.      amLODIPine (NORVASC) 5 MG tablet Take 1 tablet (5 mg total) by mouth 2 (two) times daily. 180 tablet 3    gemfibroziL (LOPID) 600 MG tablet Take 1 tablet (600 mg total) by mouth Daily. 90 tablet 3    lisinopriL (PRINIVIL,ZESTRIL) 20 MG tablet Take 1 tablet (20 mg total) by mouth once daily. 90 tablet 3    omeprazole (PRILOSEC) 20 MG capsule Take 1 capsule (20 mg total) by mouth once daily. 90 capsule 3     No current facility-administered medications for this visit.       Labs:274}   I have reviewed labs below:    Lab Results   Component Value Date    WBC 7.12 2024    RBC 5.42 2024    HGB 16.0 2024    HCT 49.9 2024     2024     (L) 2024    K 4.6 2024     2024    CALCIUM 9.4 2024    GLU 92  09/04/2024    BUN 15 09/04/2024    CREATININE 1.25 10/08/2024    EGFRNONAA 84 07/11/2022    ALT 19 09/04/2024    AST 21 09/04/2024    INR 1.07 01/02/2024    INR 1.07 01/02/2024    CHOL 157 09/04/2024    TRIG 97 09/04/2024    HDL 45 09/04/2024    LDLCALC 93 09/04/2024    TSH 2.570 09/04/2024    PSA <0.010 09/04/2024    HGBA1C 5.3 09/04/2024    MICROALBUR <5.0 (H) 11/19/2024       Medical History: 274}     Past Medical History:   Diagnosis Date    Carcinoma of prostate     Diverticula, colon 08/19/2021    Essential hypertension     GERD (gastroesophageal reflux disease)     History of colon polyps 08/19/2021    Hyperlipidemia     Obesity     Partial small bowel obstruction 02/23/2023    Polyp of ascending colon 08/19/2021      Tobacco Use History[1]   Past Surgical History:   Procedure Laterality Date    ABDOMINAL SURGERY N/A     blockage of the bowels    ARTHROPLASTY, KNEE, TOTAL, USING COMPUTER-ASSISTED NAVIGATION Right 1/8/2024    Procedure: ARTHROPLASTY, KNEE, TOTAL, USING COMPUTER-ASSISTED NAVIGATION;  Surgeon: Bill Avila III, MD;  Location: [facility];  Service: Orthopedics;  Laterality: Right;    COLONOSCOPY  08/23/2021    repeat 1 year    LAPAROTOMY, EXPLORATORY N/A 2/24/2023    Procedure: LAPAROTOMY, EXPLORATORY;  Surgeon: Kaleigh Beach MD;  Location: [facility];  Service: General;  Laterality: N/A;  mesh removal    PROSTATECTOMY      ROBOT-ASSISTED LAPAROSCOPIC LYSIS OF ADHESIONS USING DA VICKY XI  6/10/2024    Procedure: XI ROBOTIC LYSIS, ADHESIONS;  Surgeon: Roby Mcmahon MD;  Location: [facility] OR;  Service: General;;    ROBOT-ASSISTED REPAIR OF VENTRAL HERNIA USING DA VICKY XI N/A 6/10/2024    Procedure: XI ROBOTIC REPAIR, HERNIA, VENTRAL;  Surgeon: Roby Mcmahon MD;  Location: [facility];  Service: General;  Laterality: N/A;    UMBILICAL HERNIA REPAIR      x 3         Health Maintenance:      Health Maintenance Topics with due status: Not Due       Topic Last Completion Date     Colonoscopy 11/09/2022    Lipid Panel 09/04/2024    Diabetes Urine Screening 11/19/2024       Objective:  274}   Vital Signs  Temp: 97.8 °F (36.6 °C)  Pulse: 98  SpO2: 96 %  BP: 99/67  BP Location: Left arm  Patient Position: Sitting  Pain Score: 0-No pain  Height and Weight  Height: 6' (182.9 cm)  Weight: 132 kg (291 lb)  BSA (Calculated - sq m): 2.59 sq meters  BMI (Calculated): 39.5  Weight in (lb) to have BMI = 25: 183.9    Over the last two weeks how often have you been bothered by little interest or pleasure in doing things: 0  Over the last two weeks how often have you been bothered by feeling down, depressed or hopeless: 0  PHQ-2 Total Score: 0    Wt Readings from Last 3 Encounters:   03/04/25 132 kg (291 lb)   02/13/25 131.6 kg (290 lb 3.2 oz)   11/19/24 127.8 kg (281 lb 12.8 oz)     Physical Exam  Vitals and nursing note reviewed.   Constitutional:       General: He is not in acute distress.     Appearance: Normal appearance. He is not ill-appearing.   HENT:      Head: Normocephalic.   Eyes:      Conjunctiva/sclera: Conjunctivae normal.   Neck:      Trachea: Trachea normal.   Cardiovascular:      Rate and Rhythm: Normal rate and regular rhythm.      Pulses: Normal pulses.      Heart sounds: Normal heart sounds.   Pulmonary:      Effort: Pulmonary effort is normal. No respiratory distress.      Breath sounds: Normal breath sounds. No wheezing, rhonchi or rales.   Musculoskeletal:      Cervical back: Neck supple.      Right lower leg: No edema.      Left lower leg: No edema.   Lymphadenopathy:      Cervical: No cervical adenopathy.   Skin:     General: Skin is warm and dry.      Coloration: Skin is not jaundiced or pale.   Neurological:      Mental Status: He is alert and oriented to person, place, and time.      Gait: Gait normal.   Psychiatric:         Mood and Affect: Mood normal.         Behavior: Behavior normal.          Assessment and Plan: 274}       1. Essential hypertension  Assessment &  "Plan:  Controlled  Continue amlodipine 5 mg daily and lisinopril 20 mg daily.    Orders:  -     amLODIPine (NORVASC) 5 MG tablet; Take 1 tablet (5 mg total) by mouth 2 (two) times daily.  Dispense: 180 tablet; Refill: 3  -     lisinopriL (PRINIVIL,ZESTRIL) 20 MG tablet; Take 1 tablet (20 mg total) by mouth once daily.  Dispense: 90 tablet; Refill: 3    2. Chronic kidney disease, stage 3a  Assessment & Plan:  BMP  Lab Results   Component Value Date     (L) 09/04/2024    K 4.6 09/04/2024     09/04/2024    CO2 26 09/04/2024    BUN 15 09/04/2024    CREATININE 1.25 10/08/2024    CALCIUM 9.4 09/04/2024    ANIONGAP 9 09/04/2024    EGFRNORACEVR 59 (L) 10/08/2024     Stable GFR in upper 50s. Will monitor again next visit.  Avoid nephrotoxins including NSAIDs.    Continue ANGELA blockade with lisinopril.    Orders:  -     lisinopriL (PRINIVIL,ZESTRIL) 20 MG tablet; Take 1 tablet (20 mg total) by mouth once daily.  Dispense: 90 tablet; Refill: 3    3. Morbid obesity due to excess calories    4. Carcinoma of prostate  Assessment & Plan:  History of prostate cancer age 52 with PSA undetectable since then.  PSA Total (ng/mL)   Date Value   09/04/2024 <0.010     PSA due again next visit.      5. Mixed hyperlipidemia  -     gemfibroziL (LOPID) 600 MG tablet; Take 1 tablet (600 mg total) by mouth Daily.  Dispense: 90 tablet; Refill: 3    6. Gastroesophageal reflux disease without esophagitis  -     omeprazole (PRILOSEC) 20 MG capsule; Take 1 capsule (20 mg total) by mouth once daily.  Dispense: 90 capsule; Refill: 3        Assessment & Plan    IMPRESSION:  - Assessed blood pressure and chronic kidney disease at 6-month follow-up  - Noted blood pressure as "excellent" today  - Considered ordering labs to check kidney function, but decided against it due to stability  - Observed GFR consistently in upper 50 range for extended period  - Determined kidney function stable, opted to defer further testing until fall follow-up  - " Planned to include PSA screening at next visit due to patient's history of prostate cancer    CARCINOMA OF PROSTATE:  - Monitor the patient's history of prostate cancer.  - Perform PSA screening at next visit in 6 months.    MORBID OBESITY DUE TO EXCESS CALORIES:  - Acknowledged the patient's reported excessive eating problem.    CHRONIC KIDNEY DISEASE, STAGE 3A:  - Monitor the patient's kidney function, which has been stable with GFR in upper 50 range for an extended period.  - Consider whether to check kidney function now or wait until fall.  - Perform comprehensive lab work, including kidney function tests, at next visit in 6 months.    HYPERTENSION:  - Blood pressure appears excellent today.  - Conduct 6-month follow-up for blood pressure.    INFLUENZA VACCINATION:  - Note that the patient received flu vaccine at last visit.        Follow up in about 6 months (around 9/4/2025) for hypertension, hyperlipidemia, CKD, with fasting labs and PSA screen d/t history prostate CA.    Signature:  KONG Mccallum-BC    Future Appointments   Date Time Provider Department Center   4/23/2025  8:30 AM [facility] US[facility]    4/23/2025  8:45 AM Kaleigh Beach MD [facility]   9/17/2025  7:40 AM Stella Esparza FNP [facility]   2/13/2026 10:00 AM Shayna Santizo FNP [facility]     This note was generated with the assistance of ambient listening technology. Verbal consent was obtained by the patient and accompanying visitor(s) for the recording of patient appointment to facilitate this note. I attest to having reviewed and edited the generated note for accuracy, though some syntax or spelling errors may persist. Please contact the author of this note for any clarification.           [1]   Social History  Tobacco Use   Smoking Status Never   Smokeless Tobacco Never

## 2025-03-04 NOTE — ASSESSMENT & PLAN NOTE
History of prostate cancer age 52 with PSA undetectable since then.  PSA Total (ng/mL)   Date Value   09/04/2024 <0.010     PSA due again next visit.

## 2025-03-04 NOTE — ASSESSMENT & PLAN NOTE
BMP  Lab Results   Component Value Date     (L) 09/04/2024    K 4.6 09/04/2024     09/04/2024    CO2 26 09/04/2024    BUN 15 09/04/2024    CREATININE 1.25 10/08/2024    CALCIUM 9.4 09/04/2024    ANIONGAP 9 09/04/2024    EGFRNORACEVR 59 (L) 10/08/2024     Stable GFR in upper 50s. Will monitor again next visit.  Avoid nephrotoxins including NSAIDs.    Continue ANGELA blockade with lisinopril.

## 2025-04-23 ENCOUNTER — HOSPITAL ENCOUNTER (OUTPATIENT)
Dept: RADIOLOGY | Facility: HOSPITAL | Age: 80
Discharge: HOME | End: 2025-04-23
Attending: SURGERY
Payer: MEDICARE

## 2025-04-23 ENCOUNTER — OFFICE VISIT (OUTPATIENT)
Dept: VASCULAR SURGERY | Facility: CLINIC | Age: 80
End: 2025-04-23
Payer: MEDICARE

## 2025-04-23 VITALS — HEIGHT: 72 IN | WEIGHT: 291 LBS | BODY MASS INDEX: 39.42 KG/M2

## 2025-04-23 DIAGNOSIS — I72.3 ILIAC ANEURYSM: ICD-10-CM

## 2025-04-23 DIAGNOSIS — I72.3 ANEURYSM OF ILIAC ARTERY: Primary | ICD-10-CM

## 2025-04-23 PROCEDURE — 99212 OFFICE O/P EST SF 10 MIN: CPT | Mod: S$PBB,,, | Performed by: SURGERY

## 2025-04-23 PROCEDURE — 99213 OFFICE O/P EST LOW 20 MIN: CPT | Mod: PBBFAC,25 | Performed by: SURGERY

## 2025-04-23 PROCEDURE — 76775 US EXAM ABDO BACK WALL LIM: CPT | Mod: 26,,, | Performed by: SURGERY

## 2025-04-23 PROCEDURE — 76775 US EXAM ABDO BACK WALL LIM: CPT | Mod: TC

## 2025-04-23 PROCEDURE — 99999 PR PBB SHADOW E&M-EST. PATIENT-LVL III: CPT | Mod: PBBFAC,,, | Performed by: SURGERY

## 2025-04-23 NOTE — PROGRESS NOTES
Subjective:       Patient ID: Diogenes Aguilar is a 80 y.o. male.    Chief Complaint: No chief complaint on file.  Status patient follow-up duplex study abdominal aorta maximum diameter 1.8 cm in the proximal portion was 2.1 cm.  His right iliac maximum diameter 2.3 cm.  Educated patient no problems follow up in a year the ultrasound  family history includes Breast cancer in his mother.  Past Medical History:   Diagnosis Date    Carcinoma of prostate     Diverticula, colon 08/19/2021    Essential hypertension     GERD (gastroesophageal reflux disease)     History of colon polyps 08/19/2021    Hyperlipidemia     Obesity     Partial small bowel obstruction 02/23/2023    Polyp of ascending colon 08/19/2021      Past Surgical History:   Procedure Laterality Date    ABDOMINAL SURGERY N/A     blockage of the bowels    ARTHROPLASTY, KNEE, TOTAL, USING COMPUTER-ASSISTED NAVIGATION Right 1/8/2024    Procedure: ARTHROPLASTY, KNEE, TOTAL, USING COMPUTER-ASSISTED NAVIGATION;  Surgeon: Bill Avila III, MD;  Location: [facility];  Service: Orthopedics;  Laterality: Right;    COLONOSCOPY  08/23/2021    repeat 1 year    LAPAROTOMY, EXPLORATORY N/A 2/24/2023    Procedure: LAPAROTOMY, EXPLORATORY;  Surgeon: Kaleigh Beach MD;  Location: [facility] OR;  Service: General;  Laterality: N/A;  mesh removal    PROSTATECTOMY      ROBOT-ASSISTED LAPAROSCOPIC LYSIS OF ADHESIONS USING DA VICKY XI  6/10/2024    Procedure: XI ROBOTIC LYSIS, ADHESIONS;  Surgeon: Roby Mcmahon MD;  Location: [facility];  Service: General;;    ROBOT-ASSISTED REPAIR OF VENTRAL HERNIA USING DA VICKY XI N/A 6/10/2024    Procedure: XI ROBOTIC REPAIR, HERNIA, VENTRAL;  Surgeon: Roby Mcmahon MD;  Location: [facility];  Service: General;  Laterality: N/A;    UMBILICAL HERNIA REPAIR      x 3        reports that he has never smoked. He has never used smokeless tobacco. He reports current alcohol use. He reports that he does not use drugs.   HPI  Review  of Systems      Objective:      Ht 6' (1.829 m)   Wt 132 kg (291 lb 0.1 oz)   BMI 39.47 kg/m²    Physical Exam  Constitutional:       Appearance: Normal appearance.   Skin:     General: Skin is warm.      Capillary Refill: Capillary refill takes less than 2 seconds.   Neurological:      General: No focal deficit present.      Mental Status: He is alert.   Psychiatric:         Mood and Affect: Mood normal.         Behavior: Behavior normal.         Thought Content: Thought content normal.         Judgment: Judgment normal.           Assessment:       1. Aneurysm of iliac artery        Plan:       Duplex in a year educated patient call for problems

## (undated) DEVICE — DRESSING AQUACEL FOAM ADH 8X7

## (undated) DEVICE — TRAY SKIN SCRUB WET PREMIUM

## (undated) DEVICE — SUT CTD VICRYL VIL BR SH 27

## (undated) DEVICE — SOL NACL IRR 1000ML BTL

## (undated) DEVICE — BANDAGE SURE-WRAP 6INX5YD

## (undated) DEVICE — SET PNEUMOCLEAR HEAT HUM SE HF

## (undated) DEVICE — TROCAR ENDO Z THREAD KII 5X100

## (undated) DEVICE — SPONGE LAP XRAY DTECT 18X18IN

## (undated) DEVICE — SUT 2/0 36IN ETHIBOND EXCE

## (undated) DEVICE — TRAY CATH FOL SIL URIMTR 16FR

## (undated) DEVICE — SEAL UNIVERSAL 5MM-8MM XI

## (undated) DEVICE — GLOVE SENSICARE PI GRN 6.5

## (undated) DEVICE — GLOVE 7.5 PROTEXIS PI BLUE

## (undated) DEVICE — SUT 0 18IN SILK BLK BRAIDE

## (undated) DEVICE — COVER PROXIMA MAYO STAND

## (undated) DEVICE — GLOVE SENSICARE PI SURG 7

## (undated) DEVICE — WARMER BLUE HEAT SCOPE 3-12MM

## (undated) DEVICE — GLOVE SENSICARE PI SURG 6.5

## (undated) DEVICE — KIT BASIC RUSH

## (undated) DEVICE — PROGRASP DA VINCI

## (undated) DEVICE — GLOVE 7.0 PROTEXIS PI BLUE

## (undated) DEVICE — GLOVE PROTEXIS PI SYN SURG 6.0

## (undated) DEVICE — SEALER LIGASURE IMPACT 18CM

## (undated) DEVICE — DEVICE CLOSURE TROCAR SITE

## (undated) DEVICE — DRESSING AQUACEL HEEL 8X5.5

## (undated) DEVICE — GLOVE 6.0 PROTEXIS PI BLUE

## (undated) DEVICE — BLADE RECIP DOUBLE SIDED

## (undated) DEVICE — GOWN NONREINF SET-IN SLV 2XL

## (undated) DEVICE — OVERLAY MATTRESS WAFFLE

## (undated) DEVICE — Device

## (undated) DEVICE — SYR ONLY LUER LOCK 20CC

## (undated) DEVICE — SUT 1 48IN PDS II VIO MONO

## (undated) DEVICE — TOURNIQUET SB QC SP 34X4IN

## (undated) DEVICE — GOWN TOGA SYS PEELWY ZIP 2 XL

## (undated) DEVICE — SUT #2 TI-CRON HGS-21 30IN

## (undated) DEVICE — GLOVE PROTEXIS PI SYN SURG 7.5

## (undated) DEVICE — SCRUB 10% POVIDONE IODINE 4OZ

## (undated) DEVICE — CORD LAP 10 DISP

## (undated) DEVICE — SUT SILK 3-0 SH DETACH 30IN

## (undated) DEVICE — NDL QUINCKE SPINAL 20G 3.5IN

## (undated) DEVICE — SOL IRRI STRL WATER 1000ML

## (undated) DEVICE — SUT MONOCYRL 4-0 PS2 UND

## (undated) DEVICE — STAPLER SKIN WIDE

## (undated) DEVICE — GOWN POLY REINF BRTH SLV XL

## (undated) DEVICE — OBTURATOR BLADELESS 8MM XI CLR

## (undated) DEVICE — SCISSOR HOT SHEARS XI

## (undated) DEVICE — SKIN STAPLER PMR35

## (undated) DEVICE — SPONGE COTTON WOVEN 4X4IN

## (undated) DEVICE — KIT TOTAL KNEE RUSH

## (undated) DEVICE — TOWER MIX CEMENT BONE SMARTMIX

## (undated) DEVICE — GLOVE BIOGEL SKINSENSE PI 7.0

## (undated) DEVICE — GLOVE BIOGEL PI MICRO INDIC 8

## (undated) DEVICE — DRESSING AQUACEL FOAM RECT 6X6

## (undated) DEVICE — BLADE PERFORMANCE SAG 21X90MM

## (undated) DEVICE — GLOVE 8 PROTEXIS PI BLUE

## (undated) DEVICE — GLOVE SENSICARE PI GRN 6

## (undated) DEVICE — SUT STRATAFIX 1 SPIRAL .5

## (undated) DEVICE — SOL NACL IRR 3000ML

## (undated) DEVICE — KIT IRR SUCTION HND PIECE

## (undated) DEVICE — DRAPE ARM DAVINCI XI

## (undated) DEVICE — COVER TIP CURVED SCISSORS XI

## (undated) DEVICE — GLOVE BIOGEL SKINSENSE PI 8.0

## (undated) DEVICE — SUT GUT CHROMIC 3-0 SH 36IN

## (undated) DEVICE — KIT EVACUATOR 3 SPR  DRN 400CC

## (undated) DEVICE — SCRUB DYNA-HEX LIQ 4% CHG 4OZ

## (undated) DEVICE — TROCAR KII FIOS ZTHREAD 12X100

## (undated) DEVICE — GLOVE BIOGEL SKINSENSE PI 7.5

## (undated) DEVICE — DRIVER NEEDLE SUTURECUT MEGA

## (undated) DEVICE — DRAPE THREE-QTR REINF 53X77IN

## (undated) DEVICE — MARKER SKIN RULER AND LABEL

## (undated) DEVICE — CANISTER SUCTION MEDI-VAC 12L

## (undated) DEVICE — DRESSING PICO 7 TWO 10X30CM

## (undated) DEVICE — SOLIDIFIER BTL W/TREAT 1500CC

## (undated) DEVICE — DRAPE INCISE IOBAN 2 23X23IN

## (undated) DEVICE — GLOVE PROTEXIS PI SYN SURG 6.5

## (undated) DEVICE — SUT 2-0 VICRYL / CT-1

## (undated) DEVICE — ATTUNE PINNING SYSTEM
Type: IMPLANTABLE DEVICE | Site: KNEE | Status: NON-FUNCTIONAL
Brand: ATTUNE
Removed: 2024-01-08